# Patient Record
Sex: MALE | Race: WHITE | NOT HISPANIC OR LATINO
[De-identification: names, ages, dates, MRNs, and addresses within clinical notes are randomized per-mention and may not be internally consistent; named-entity substitution may affect disease eponyms.]

---

## 2017-04-11 ENCOUNTER — APPOINTMENT (OUTPATIENT)
Dept: PULMONOLOGY | Facility: CLINIC | Age: 72
End: 2017-04-11

## 2017-04-28 ENCOUNTER — APPOINTMENT (OUTPATIENT)
Dept: PULMONOLOGY | Facility: CLINIC | Age: 72
End: 2017-04-28

## 2017-04-28 VITALS
WEIGHT: 172 LBS | DIASTOLIC BLOOD PRESSURE: 84 MMHG | HEART RATE: 66 BPM | OXYGEN SATURATION: 94 % | SYSTOLIC BLOOD PRESSURE: 160 MMHG | BODY MASS INDEX: 26.54 KG/M2

## 2017-04-28 DIAGNOSIS — J44.9 CHRONIC OBSTRUCTIVE PULMONARY DISEASE, UNSPECIFIED: ICD-10-CM

## 2017-04-28 DIAGNOSIS — J45.909 CHRONIC OBSTRUCTIVE PULMONARY DISEASE, UNSPECIFIED: ICD-10-CM

## 2017-05-05 ENCOUNTER — APPOINTMENT (OUTPATIENT)
Dept: PULMONOLOGY | Facility: CLINIC | Age: 72
End: 2017-05-05

## 2017-05-05 VITALS
DIASTOLIC BLOOD PRESSURE: 78 MMHG | SYSTOLIC BLOOD PRESSURE: 116 MMHG | HEART RATE: 70 BPM | OXYGEN SATURATION: 97 % | BODY MASS INDEX: 26.68 KG/M2 | WEIGHT: 172 LBS | HEIGHT: 67.5 IN

## 2017-06-12 NOTE — ASU PATIENT PROFILE, ADULT - PMH
Bladder cancer    Diabetes mellitus    GI bleeding    HLD (hyperlipidemia)    Hypertension    Lung cancer    SOB (shortness of breath)

## 2017-06-13 ENCOUNTER — OUTPATIENT (OUTPATIENT)
Dept: OUTPATIENT SERVICES | Facility: HOSPITAL | Age: 72
LOS: 1 days | Discharge: HOME | End: 2017-06-13
Payer: MEDICARE

## 2017-06-13 ENCOUNTER — RESULT REVIEW (OUTPATIENT)
Age: 72
End: 2017-06-13

## 2017-06-13 VITALS
OXYGEN SATURATION: 97 % | WEIGHT: 158.73 LBS | HEART RATE: 63 BPM | SYSTOLIC BLOOD PRESSURE: 119 MMHG | RESPIRATION RATE: 16 BRPM | TEMPERATURE: 99 F | DIASTOLIC BLOOD PRESSURE: 58 MMHG | HEIGHT: 67 IN

## 2017-06-13 VITALS
OXYGEN SATURATION: 100 % | HEART RATE: 62 BPM | RESPIRATION RATE: 15 BRPM | DIASTOLIC BLOOD PRESSURE: 70 MMHG | SYSTOLIC BLOOD PRESSURE: 140 MMHG

## 2017-06-13 DIAGNOSIS — Z41.9 ENCOUNTER FOR PROCEDURE FOR PURPOSES OTHER THAN REMEDYING HEALTH STATE, UNSPECIFIED: Chronic | ICD-10-CM

## 2017-06-13 DIAGNOSIS — R91.1 SOLITARY PULMONARY NODULE: Chronic | ICD-10-CM

## 2017-06-13 PROCEDURE — 88305 TISSUE EXAM BY PATHOLOGIST: CPT

## 2017-06-13 PROCEDURE — 52234 CYSTOSCOPY AND TREATMENT: CPT

## 2017-06-13 RX ORDER — ONDANSETRON 8 MG/1
4 TABLET, FILM COATED ORAL EVERY 6 HOURS
Refills: 0 | Status: DISCONTINUED | OUTPATIENT
Start: 2017-06-13 | End: 2017-06-13

## 2017-06-13 RX ORDER — SODIUM CHLORIDE 9 MG/ML
1000 INJECTION, SOLUTION INTRAVENOUS
Refills: 0 | Status: DISCONTINUED | OUTPATIENT
Start: 2017-06-13 | End: 2017-06-13

## 2017-06-13 RX ORDER — HYDROMORPHONE HYDROCHLORIDE 2 MG/ML
0.5 INJECTION INTRAMUSCULAR; INTRAVENOUS; SUBCUTANEOUS ONCE
Refills: 0 | Status: DISCONTINUED | OUTPATIENT
Start: 2017-06-13 | End: 2017-06-13

## 2017-06-13 NOTE — BRIEF OPERATIVE NOTE - OPERATION/FINDINGS
Small sub cm broad based papillary tumor right lateral wall, additional erythematous plaques, posterior wall sent for biopsy

## 2017-06-13 NOTE — PROGRESS NOTE ADULT - SUBJECTIVE AND OBJECTIVE BOX
:  Patient admitted for a TURB/Bladder biopsy as management of a recurrent bladder tumor.  Procedure with risks (including bleeding, infection, change in voiding symptoms, bladder/bowel/vascular injury), benefits and alternatives were thoroughly discussed.  MK Galloway

## 2017-06-13 NOTE — PACU DISCHARGE NOTE - COMMENTS
patient able to void 200 ml of clear yellow urine.  post void scan 65 ml  cleared to be discharged by urology team

## 2017-06-15 LAB — SURGICAL PATHOLOGY STUDY: SIGNIFICANT CHANGE UP

## 2017-06-19 DIAGNOSIS — N32.89 OTHER SPECIFIED DISORDERS OF BLADDER: ICD-10-CM

## 2017-06-19 DIAGNOSIS — C67.2 MALIGNANT NEOPLASM OF LATERAL WALL OF BLADDER: ICD-10-CM

## 2017-06-19 DIAGNOSIS — I10 ESSENTIAL (PRIMARY) HYPERTENSION: ICD-10-CM

## 2017-06-19 DIAGNOSIS — J44.9 CHRONIC OBSTRUCTIVE PULMONARY DISEASE, UNSPECIFIED: ICD-10-CM

## 2017-06-19 DIAGNOSIS — Z85.118 PERSONAL HISTORY OF OTHER MALIGNANT NEOPLASM OF BRONCHUS AND LUNG: ICD-10-CM

## 2017-06-19 DIAGNOSIS — E11.9 TYPE 2 DIABETES MELLITUS WITHOUT COMPLICATIONS: ICD-10-CM

## 2017-06-19 DIAGNOSIS — Z79.84 LONG TERM (CURRENT) USE OF ORAL HYPOGLYCEMIC DRUGS: ICD-10-CM

## 2018-05-14 NOTE — PATIENT PROFILE ADULT. - ABILITY TO HEAR (WITH HEARING AID OR HEARING APPLIANCE IF NORMALLY USED):
[place] in right ear/Mildly to Moderately Impaired: difficulty hearing in some environments or speaker may need to increase volume or speak distinctly

## 2018-05-15 ENCOUNTER — RESULT REVIEW (OUTPATIENT)
Age: 73
End: 2018-05-15

## 2018-05-15 ENCOUNTER — OUTPATIENT (OUTPATIENT)
Dept: INPATIENT UNIT | Facility: HOSPITAL | Age: 73
LOS: 1 days | Discharge: HOME | End: 2018-05-15
Payer: MEDICARE

## 2018-05-15 VITALS
HEART RATE: 62 BPM | SYSTOLIC BLOOD PRESSURE: 125 MMHG | OXYGEN SATURATION: 97 % | DIASTOLIC BLOOD PRESSURE: 58 MMHG | TEMPERATURE: 97 F | RESPIRATION RATE: 16 BRPM | HEIGHT: 67 IN | WEIGHT: 177.91 LBS

## 2018-05-15 VITALS
HEART RATE: 52 BPM | OXYGEN SATURATION: 97 % | SYSTOLIC BLOOD PRESSURE: 123 MMHG | DIASTOLIC BLOOD PRESSURE: 59 MMHG | RESPIRATION RATE: 16 BRPM

## 2018-05-15 DIAGNOSIS — R91.1 SOLITARY PULMONARY NODULE: Chronic | ICD-10-CM

## 2018-05-15 DIAGNOSIS — Z41.9 ENCOUNTER FOR PROCEDURE FOR PURPOSES OTHER THAN REMEDYING HEALTH STATE, UNSPECIFIED: Chronic | ICD-10-CM

## 2018-05-15 LAB
GLUCOSE BLDC GLUCOMTR-MCNC: 205 MG/DL — HIGH (ref 70–99)
GLUCOSE BLDC GLUCOMTR-MCNC: 211 MG/DL — HIGH (ref 70–99)

## 2018-05-15 RX ORDER — HYDROMORPHONE HYDROCHLORIDE 2 MG/ML
0.5 INJECTION INTRAMUSCULAR; INTRAVENOUS; SUBCUTANEOUS
Refills: 0 | Status: DISCONTINUED | OUTPATIENT
Start: 2018-05-15 | End: 2018-05-15

## 2018-05-15 RX ORDER — SODIUM CHLORIDE 9 MG/ML
1000 INJECTION, SOLUTION INTRAVENOUS
Refills: 0 | Status: DISCONTINUED | OUTPATIENT
Start: 2018-05-15 | End: 2018-05-15

## 2018-05-15 RX ORDER — ONDANSETRON 8 MG/1
4 TABLET, FILM COATED ORAL EVERY 6 HOURS
Refills: 0 | Status: DISCONTINUED | OUTPATIENT
Start: 2018-05-15 | End: 2018-05-15

## 2018-05-15 RX ORDER — METOCLOPRAMIDE HCL 10 MG
10 TABLET ORAL ONCE
Refills: 0 | Status: DISCONTINUED | OUTPATIENT
Start: 2018-05-15 | End: 2018-05-15

## 2018-05-15 RX ORDER — OXYCODONE AND ACETAMINOPHEN 5; 325 MG/1; MG/1
1 TABLET ORAL EVERY 4 HOURS
Refills: 0 | Status: DISCONTINUED | OUTPATIENT
Start: 2018-05-15 | End: 2018-05-15

## 2018-05-15 RX ORDER — OXYCODONE AND ACETAMINOPHEN 5; 325 MG/1; MG/1
2 TABLET ORAL EVERY 6 HOURS
Refills: 0 | Status: DISCONTINUED | OUTPATIENT
Start: 2018-05-15 | End: 2018-05-15

## 2018-05-15 RX ORDER — OXYBUTYNIN CHLORIDE 5 MG
5 TABLET ORAL ONCE
Refills: 0 | Status: DISCONTINUED | OUTPATIENT
Start: 2018-05-15 | End: 2018-05-15

## 2018-05-15 RX ORDER — INSULIN LISPRO 100/ML
VIAL (ML) SUBCUTANEOUS
Refills: 0 | Status: DISCONTINUED | OUTPATIENT
Start: 2018-05-15 | End: 2018-05-15

## 2018-05-15 RX ADMIN — Medication 2: at 13:04

## 2018-05-15 NOTE — PROGRESS NOTE ADULT - SUBJECTIVE AND OBJECTIVE BOX
Admit/Preop Note:  Mr. Otero is admitted for a cystoscopy, selective cytologies, retrograde pyelograms, TURBT and possible bladder biopsies with fulguration, as management of a 5mm tumor seen on a CT scan as well as abnormal urine cytology and FISH tests.  The procedure, including the risks (bleeding, infection, change in LUTS, urinary tract/bowel/vascular injuries, need for subsequent intervention), benefits and alternatives were thoroughly discussed.  MK Pereira

## 2018-05-16 LAB
NON-GYNECOLOGICAL CYTOLOGY STUDY: SIGNIFICANT CHANGE UP
SURGICAL PATHOLOGY STUDY: SIGNIFICANT CHANGE UP

## 2018-05-17 PROCEDURE — 86900 BLOOD TYPING SEROLOGIC ABO: CPT

## 2018-05-17 PROCEDURE — 86850 RBC ANTIBODY SCREEN: CPT

## 2018-05-17 PROCEDURE — 86901 BLOOD TYPING SEROLOGIC RH(D): CPT

## 2018-05-17 PROCEDURE — 82962 GLUCOSE BLOOD TEST: CPT

## 2018-05-17 PROCEDURE — 88112 CYTOPATH CELL ENHANCE TECH: CPT

## 2018-05-17 PROCEDURE — 52235 CYSTOSCOPY AND TREATMENT: CPT

## 2018-05-17 PROCEDURE — 88305 TISSUE EXAM BY PATHOLOGIST: CPT

## 2019-11-11 ENCOUNTER — RECORD ABSTRACTING (OUTPATIENT)
Age: 74
End: 2019-11-11

## 2019-11-11 DIAGNOSIS — Z87.891 PERSONAL HISTORY OF NICOTINE DEPENDENCE: ICD-10-CM

## 2019-11-11 DIAGNOSIS — Z78.9 OTHER SPECIFIED HEALTH STATUS: ICD-10-CM

## 2019-11-11 LAB
PSA FREE FLD-MCNC: 31
PSA FREE SERPL-MCNC: 1.1
PSA SERPL-MCNC: 3.5
TESTOST SERPL-MCNC: 405.5

## 2019-11-11 NOTE — ASU PATIENT PROFILE, ADULT - PMH
Bladder cancer    Diabetes mellitus    GI bleeding    HLD (hyperlipidemia)    Hypertension    Lung cancer    SOB (shortness of breath) Bladder cancer    Diabetes mellitus    Diabetes mellitus, type 2    GI bleeding    HLD (hyperlipidemia)    Hypertension    Lung cancer    SOB (shortness of breath)

## 2019-11-11 NOTE — ASU PATIENT PROFILE, ADULT - NS PRO AD PATIENT TYPE
Health Care Proxy (HCP)/Jennifer (Wife) [phone] Jennifer (Wife) [phone]/Health Care Proxy (HCP)

## 2019-11-11 NOTE — ASU PATIENT PROFILE, ADULT - ABILITY TO HEAR (WITH HEARING AID OR HEARING APPLIANCE IF NORMALLY USED):
[place] in right ear/Mildly to Moderately Impaired: difficulty hearing in some environments or speaker may need to increase volume or speak distinctly

## 2019-11-11 NOTE — ASU PATIENT PROFILE, ADULT - PSH
Elective surgery  left ear cyst removal  Elective surgery  Nasal cyst removal  Lung nodule  removal Elective surgery  Nasal cyst removal  Elective surgery  left ear cyst removal  History of surgery  left lung lobectomy

## 2019-11-12 ENCOUNTER — APPOINTMENT (OUTPATIENT)
Dept: UROLOGY | Facility: HOSPITAL | Age: 74
End: 2019-11-12

## 2019-11-18 VITALS
RESPIRATION RATE: 16 BRPM | TEMPERATURE: 98 F | WEIGHT: 168.87 LBS | SYSTOLIC BLOOD PRESSURE: 169 MMHG | HEIGHT: 67 IN | DIASTOLIC BLOOD PRESSURE: 71 MMHG | OXYGEN SATURATION: 98 % | HEART RATE: 70 BPM

## 2019-11-19 ENCOUNTER — APPOINTMENT (OUTPATIENT)
Dept: UROLOGY | Facility: HOSPITAL | Age: 74
End: 2019-11-19

## 2019-11-19 ENCOUNTER — OUTPATIENT (OUTPATIENT)
Dept: OUTPATIENT SERVICES | Facility: HOSPITAL | Age: 74
LOS: 1 days | Discharge: HOME | End: 2019-11-19
Payer: MEDICARE

## 2019-11-19 ENCOUNTER — RESULT REVIEW (OUTPATIENT)
Age: 74
End: 2019-11-19

## 2019-11-19 VITALS
DIASTOLIC BLOOD PRESSURE: 80 MMHG | SYSTOLIC BLOOD PRESSURE: 167 MMHG | TEMPERATURE: 100 F | OXYGEN SATURATION: 99 % | HEART RATE: 62 BPM | RESPIRATION RATE: 20 BRPM

## 2019-11-19 DIAGNOSIS — Z98.890 OTHER SPECIFIED POSTPROCEDURAL STATES: Chronic | ICD-10-CM

## 2019-11-19 DIAGNOSIS — R91.1 SOLITARY PULMONARY NODULE: Chronic | ICD-10-CM

## 2019-11-19 DIAGNOSIS — Z41.9 ENCOUNTER FOR PROCEDURE FOR PURPOSES OTHER THAN REMEDYING HEALTH STATE, UNSPECIFIED: Chronic | ICD-10-CM

## 2019-11-19 LAB
GLUCOSE BLDC GLUCOMTR-MCNC: 187 MG/DL — HIGH (ref 70–99)
GLUCOSE BLDC GLUCOMTR-MCNC: 211 MG/DL — HIGH (ref 70–99)

## 2019-11-19 PROCEDURE — 52235 CYSTOSCOPY AND TREATMENT: CPT

## 2019-11-19 RX ORDER — SODIUM CHLORIDE 9 MG/ML
1000 INJECTION, SOLUTION INTRAVENOUS
Refills: 0 | Status: DISCONTINUED | OUTPATIENT
Start: 2019-11-19 | End: 2019-11-19

## 2019-11-19 RX ORDER — DEXTROSE 50 % IN WATER 50 %
25 SYRINGE (ML) INTRAVENOUS ONCE
Refills: 0 | Status: DISCONTINUED | OUTPATIENT
Start: 2019-11-19 | End: 2019-11-19

## 2019-11-19 RX ORDER — INSULIN LISPRO 100/ML
VIAL (ML) SUBCUTANEOUS AT BEDTIME
Refills: 0 | Status: DISCONTINUED | OUTPATIENT
Start: 2019-11-19 | End: 2019-11-19

## 2019-11-19 RX ORDER — GLUCAGON INJECTION, SOLUTION 0.5 MG/.1ML
1 INJECTION, SOLUTION SUBCUTANEOUS ONCE
Refills: 0 | Status: DISCONTINUED | OUTPATIENT
Start: 2019-11-19 | End: 2019-11-19

## 2019-11-19 RX ORDER — DEXTROSE 50 % IN WATER 50 %
15 SYRINGE (ML) INTRAVENOUS ONCE
Refills: 0 | Status: DISCONTINUED | OUTPATIENT
Start: 2019-11-19 | End: 2019-11-19

## 2019-11-19 RX ORDER — INSULIN LISPRO 100/ML
VIAL (ML) SUBCUTANEOUS
Refills: 0 | Status: DISCONTINUED | OUTPATIENT
Start: 2019-11-19 | End: 2019-11-19

## 2019-11-19 RX ORDER — DEXTROSE 50 % IN WATER 50 %
12.5 SYRINGE (ML) INTRAVENOUS ONCE
Refills: 0 | Status: DISCONTINUED | OUTPATIENT
Start: 2019-11-19 | End: 2019-11-19

## 2019-11-19 RX ADMIN — Medication 2: at 11:34

## 2019-11-19 NOTE — PACU DISCHARGE NOTE - COMMENTS
Pt discharged to home. Thompson Catheter teaching given to pt. Discharge paperwork and  instructions given to pt. Iv heplock removed. Safety protocol maintained.

## 2019-11-19 NOTE — PROGRESS NOTE ADULT - SUBJECTIVE AND OBJECTIVE BOX
:  Admit/Preop Note  Mr. Otero is here for management of recurrent bladder tumors with a bladder biopsy/fulguration and/or TURBt. He also has a urethral stricture which will likely need to be addressed. The procedures with risks (including bleeding, infection, change in voiding symptoms, incontinence, urinary tract/bowel/penile/vascular injuries), benefits and alternatives were thoroughly discussed.

## 2019-11-20 PROBLEM — C67.9 MALIGNANT NEOPLASM OF BLADDER, UNSPECIFIED: Chronic | Status: ACTIVE | Noted: 2017-06-12

## 2019-11-20 PROBLEM — Z00.00 ENCOUNTER FOR PREVENTIVE HEALTH EXAMINATION: Noted: 2019-11-20

## 2019-11-20 PROBLEM — I10 ESSENTIAL (PRIMARY) HYPERTENSION: Chronic | Status: ACTIVE | Noted: 2017-06-12

## 2019-11-20 PROBLEM — E78.5 HYPERLIPIDEMIA, UNSPECIFIED: Chronic | Status: ACTIVE | Noted: 2017-06-12

## 2019-11-20 PROBLEM — C34.90 MALIGNANT NEOPLASM OF UNSPECIFIED PART OF UNSPECIFIED BRONCHUS OR LUNG: Chronic | Status: ACTIVE | Noted: 2017-06-12

## 2019-11-20 LAB — SURGICAL PATHOLOGY STUDY: SIGNIFICANT CHANGE UP

## 2019-11-21 ENCOUNTER — APPOINTMENT (OUTPATIENT)
Dept: UROLOGY | Facility: CLINIC | Age: 74
End: 2019-11-21
Payer: MEDICARE

## 2019-11-21 VITALS
WEIGHT: 178 LBS | HEIGHT: 67.5 IN | SYSTOLIC BLOOD PRESSURE: 139 MMHG | BODY MASS INDEX: 27.61 KG/M2 | TEMPERATURE: 98.4 F | DIASTOLIC BLOOD PRESSURE: 77 MMHG

## 2019-11-21 DIAGNOSIS — E11.9 TYPE 2 DIABETES MELLITUS W/OUT COMPLICATIONS: ICD-10-CM

## 2019-11-21 DIAGNOSIS — E78.5 HYPERLIPIDEMIA, UNSPECIFIED: ICD-10-CM

## 2019-11-21 PROCEDURE — 99215 OFFICE O/P EST HI 40 MIN: CPT

## 2019-11-21 RX ORDER — ALBUTEROL SULFATE 5 MG/ML
SOLUTION, NON-ORAL INHALATION
Refills: 0 | Status: ACTIVE | COMMUNITY

## 2019-11-21 RX ORDER — LINAGLIPTIN AND METFORMIN HYDROCHLORIDE 2.5; 1 MG/1; MG/1
TABLET, FILM COATED ORAL
Refills: 0 | Status: ACTIVE | COMMUNITY

## 2019-11-21 NOTE — REVIEW OF SYSTEMS
[Shortness Of Breath] : shortness of breath [Urinary Retention] : urinary retention [Urinary Stream Starts/Stops] : urinary stream starts and stops [Initiating Urination Req. Strain] : initiating urination requires straining [Negative] : Heme/Lymph

## 2019-11-21 NOTE — ASSESSMENT
[FreeTextEntry1] : I discussed the findings and options in detail with Mr. Otero and his nephew. 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 For this reason I recommended that he again undergo a six-week course of intravesical BCG.\par \par I reviewed the procedure with him including the risks benefits and alternatives.  He will schedule this once he returns from [facility] after Christmas. In the meantime he will continue with the alfuzosin for his lower urinary tract symptoms.  Finally, I advised Mr. Otero to obtain a urine culture 10 days prior to starting the intravesical therapy.

## 2019-11-21 NOTE — PHYSICAL EXAM
[General Appearance - Well Developed] : well developed [General Appearance - Well Nourished] : well nourished [Normal Appearance] : normal appearance [Well Groomed] : well groomed [General Appearance - In No Acute Distress] : no acute distress [Abdomen Soft] : soft [Abdomen Tenderness] : non-tender [Costovertebral Angle Tenderness] : no ~M costovertebral angle tenderness [Urethral Meatus] : meatus normal [Penis Abnormality] : normal uncircumcised penis [Scrotum] : the scrotum was normal [Epididymis] : the epididymides were normal [Testes Tenderness] : no tenderness of the testes [Testes Mass (___cm)] : there were no testicular masses [Rectal Exam - Rectum] : digital rectal exam was normal [No Prostate Nodules] : no prostate nodules [Skin Color & Pigmentation] : normal skin color and pigmentation [Heart Rate And Rhythm] : Heart rate and rhythm were normal [Edema] : no peripheral edema [] : no respiratory distress [Respiration, Rhythm And Depth] : normal respiratory rhythm and effort [Exaggerated Use Of Accessory Muscles For Inspiration] : no accessory muscle use [Oriented To Time, Place, And Person] : oriented to person, place, and time [Affect] : the affect was normal [Mood] : the mood was normal [Not Anxious] : not anxious [Normal Station and Gait] : the gait and station were normal for the patient's age [No Focal Deficits] : no focal deficits [No Palpable Adenopathy] : no palpable adenopathy [FreeTextEntry1] : Testicular asymmetry (Rt >Lt)

## 2019-11-21 NOTE — HISTORY OF PRESENT ILLNESS
[FreeTextEntry1] : Mr. Otero is here for followup after having a transurethral resection of several small bladder tumors earlier this week. He is currently with a Thompson catheter which will be removed for a voiding trial today.\par From his past urologic history Mr. Otero was first diagnosed with bladder cancer in July of 2010 with a recurrence in September 2010 and again in June of 2015, June of 2017 and May of 2018. He has always had known non-muscle invasive disease although high grade. 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 Otero has moderate chronic voiding symptoms (obstructive and irritative) for which he is continuing on alfuzosin. His post void residuals are elevated with a most recent value from October of this year being 121 cc. He also has erectile dysfunction but chooses not to have treatment for this. Finally he has a proximal bulbar urethral stricture which calibrates to approximately 16 or 18 [place]. A PSA from 6/4/19 was stable at 3.5ng/ml.

## 2019-11-22 ENCOUNTER — INPATIENT (INPATIENT)
Facility: HOSPITAL | Age: 74
LOS: 2 days | Discharge: HOME | DRG: 872 | End: 2019-11-25
Attending: UROLOGY | Admitting: UROLOGY
Payer: MEDICARE

## 2019-11-22 VITALS
TEMPERATURE: 98 F | DIASTOLIC BLOOD PRESSURE: 46 MMHG | OXYGEN SATURATION: 96 % | RESPIRATION RATE: 16 BRPM | WEIGHT: 173.06 LBS | HEART RATE: 106 BPM | HEIGHT: 67 IN | SYSTOLIC BLOOD PRESSURE: 92 MMHG

## 2019-11-22 DIAGNOSIS — A41.9 SEPSIS, UNSPECIFIED ORGANISM: ICD-10-CM

## 2019-11-22 DIAGNOSIS — Z41.9 ENCOUNTER FOR PROCEDURE FOR PURPOSES OTHER THAN REMEDYING HEALTH STATE, UNSPECIFIED: Chronic | ICD-10-CM

## 2019-11-22 DIAGNOSIS — Z98.890 OTHER SPECIFIED POSTPROCEDURAL STATES: Chronic | ICD-10-CM

## 2019-11-22 LAB
ALBUMIN SERPL ELPH-MCNC: 4 G/DL — SIGNIFICANT CHANGE UP (ref 3.3–5)
ALP SERPL-CCNC: 48 U/L — SIGNIFICANT CHANGE UP (ref 40–120)
ALT FLD-CCNC: 17 U/L — SIGNIFICANT CHANGE UP (ref 10–45)
ANION GAP SERPL CALC-SCNC: 16 MMOL/L — SIGNIFICANT CHANGE UP (ref 5–17)
APPEARANCE UR: ABNORMAL
APTT BLD: 28.5 SEC — SIGNIFICANT CHANGE UP (ref 27.5–36.3)
AST SERPL-CCNC: 33 U/L — SIGNIFICANT CHANGE UP (ref 10–40)
BASOPHILS # BLD AUTO: 0.04 K/UL — SIGNIFICANT CHANGE UP (ref 0–0.2)
BASOPHILS NFR BLD AUTO: 0.3 % — SIGNIFICANT CHANGE UP (ref 0–2)
BILIRUB SERPL-MCNC: 0.4 MG/DL — SIGNIFICANT CHANGE UP (ref 0.2–1.2)
BILIRUB UR-MCNC: NEGATIVE — SIGNIFICANT CHANGE UP
BUN SERPL-MCNC: 27 MG/DL — HIGH (ref 7–23)
CALCIUM SERPL-MCNC: 9.5 MG/DL — SIGNIFICANT CHANGE UP (ref 8.4–10.5)
CHLORIDE SERPL-SCNC: 98 MMOL/L — SIGNIFICANT CHANGE UP (ref 96–108)
CO2 SERPL-SCNC: 24 MMOL/L — SIGNIFICANT CHANGE UP (ref 22–31)
COLOR SPEC: YELLOW — SIGNIFICANT CHANGE UP
CREAT SERPL-MCNC: 1.75 MG/DL — HIGH (ref 0.5–1.3)
DIFF PNL FLD: ABNORMAL
EOSINOPHIL # BLD AUTO: 0.04 K/UL — SIGNIFICANT CHANGE UP (ref 0–0.5)
EOSINOPHIL NFR BLD AUTO: 0.3 % — SIGNIFICANT CHANGE UP (ref 0–6)
GLUCOSE BLDC GLUCOMTR-MCNC: 112 MG/DL — HIGH (ref 70–99)
GLUCOSE BLDC GLUCOMTR-MCNC: 132 MG/DL — HIGH (ref 70–99)
GLUCOSE SERPL-MCNC: 286 MG/DL — HIGH (ref 70–99)
GLUCOSE UR QL: NEGATIVE — SIGNIFICANT CHANGE UP
HCT VFR BLD CALC: 37.2 % — LOW (ref 39–50)
HGB BLD-MCNC: 12.8 G/DL — LOW (ref 13–17)
IMM GRANULOCYTES NFR BLD AUTO: 0.7 % — SIGNIFICANT CHANGE UP (ref 0–1.5)
INR BLD: 1.21 — HIGH (ref 0.88–1.16)
KETONES UR-MCNC: NEGATIVE — SIGNIFICANT CHANGE UP
LACTATE SERPL-SCNC: 3.7 MMOL/L — HIGH (ref 0.5–2)
LACTATE SERPL-SCNC: 4.4 MMOL/L — CRITICAL HIGH (ref 0.5–2)
LEUKOCYTE ESTERASE UR-ACNC: ABNORMAL
LYMPHOCYTES # BLD AUTO: 0.91 K/UL — LOW (ref 1–3.3)
LYMPHOCYTES # BLD AUTO: 6.9 % — LOW (ref 13–44)
MCHC RBC-ENTMCNC: 32 PG — SIGNIFICANT CHANGE UP (ref 27–34)
MCHC RBC-ENTMCNC: 34.4 GM/DL — SIGNIFICANT CHANGE UP (ref 32–36)
MCV RBC AUTO: 93 FL — SIGNIFICANT CHANGE UP (ref 80–100)
MONOCYTES # BLD AUTO: 0.44 K/UL — SIGNIFICANT CHANGE UP (ref 0–0.9)
MONOCYTES NFR BLD AUTO: 3.3 % — SIGNIFICANT CHANGE UP (ref 2–14)
NEUTROPHILS # BLD AUTO: 11.66 K/UL — HIGH (ref 1.8–7.4)
NEUTROPHILS NFR BLD AUTO: 88.5 % — HIGH (ref 43–77)
NITRITE UR-MCNC: NEGATIVE — SIGNIFICANT CHANGE UP
NRBC # BLD: 0 /100 WBCS — SIGNIFICANT CHANGE UP (ref 0–0)
PH UR: 6.5 — SIGNIFICANT CHANGE UP (ref 5–8)
PLATELET # BLD AUTO: 183 K/UL — SIGNIFICANT CHANGE UP (ref 150–400)
POTASSIUM SERPL-MCNC: 4.4 MMOL/L — SIGNIFICANT CHANGE UP (ref 3.5–5.3)
POTASSIUM SERPL-SCNC: 4.4 MMOL/L — SIGNIFICANT CHANGE UP (ref 3.5–5.3)
PROT SERPL-MCNC: 6.8 G/DL — SIGNIFICANT CHANGE UP (ref 6–8.3)
PROT UR-MCNC: 100 MG/DL
PROTHROM AB SERPL-ACNC: 13.8 SEC — HIGH (ref 10–12.9)
RBC # BLD: 4 M/UL — LOW (ref 4.2–5.8)
RBC # FLD: 13 % — SIGNIFICANT CHANGE UP (ref 10.3–14.5)
SODIUM SERPL-SCNC: 138 MMOL/L — SIGNIFICANT CHANGE UP (ref 135–145)
SP GR SPEC: 1.01 — SIGNIFICANT CHANGE UP (ref 1–1.03)
UROBILINOGEN FLD QL: 0.2 E.U./DL — SIGNIFICANT CHANGE UP
WBC # BLD: 13.18 K/UL — HIGH (ref 3.8–10.5)
WBC # FLD AUTO: 13.18 K/UL — HIGH (ref 3.8–10.5)

## 2019-11-22 PROCEDURE — 93010 ELECTROCARDIOGRAM REPORT: CPT

## 2019-11-22 PROCEDURE — 99291 CRITICAL CARE FIRST HOUR: CPT

## 2019-11-22 PROCEDURE — 71045 X-RAY EXAM CHEST 1 VIEW: CPT | Mod: 26

## 2019-11-22 RX ORDER — VANCOMYCIN HCL 1 G
1000 VIAL (EA) INTRAVENOUS ONCE
Refills: 0 | Status: COMPLETED | OUTPATIENT
Start: 2019-11-22 | End: 2019-11-22

## 2019-11-22 RX ORDER — INFLUENZA VIRUS VACCINE 15; 15; 15; 15 UG/.5ML; UG/.5ML; UG/.5ML; UG/.5ML
0.5 SUSPENSION INTRAMUSCULAR ONCE
Refills: 0 | Status: DISCONTINUED | OUTPATIENT
Start: 2019-11-22 | End: 2019-11-25

## 2019-11-22 RX ORDER — INSULIN LISPRO 100/ML
VIAL (ML) SUBCUTANEOUS AT BEDTIME
Refills: 0 | Status: DISCONTINUED | OUTPATIENT
Start: 2019-11-22 | End: 2019-11-25

## 2019-11-22 RX ORDER — DEXTROSE 50 % IN WATER 50 %
25 SYRINGE (ML) INTRAVENOUS ONCE
Refills: 0 | Status: DISCONTINUED | OUTPATIENT
Start: 2019-11-22 | End: 2019-11-25

## 2019-11-22 RX ORDER — INSULIN LISPRO 100/ML
VIAL (ML) SUBCUTANEOUS
Refills: 0 | Status: DISCONTINUED | OUTPATIENT
Start: 2019-11-22 | End: 2019-11-25

## 2019-11-22 RX ORDER — ACETAMINOPHEN 500 MG
650 TABLET ORAL EVERY 6 HOURS
Refills: 0 | Status: DISCONTINUED | OUTPATIENT
Start: 2019-11-22 | End: 2019-11-25

## 2019-11-22 RX ORDER — PIPERACILLIN AND TAZOBACTAM 4; .5 G/20ML; G/20ML
3.38 INJECTION, POWDER, LYOPHILIZED, FOR SOLUTION INTRAVENOUS ONCE
Refills: 0 | Status: COMPLETED | OUTPATIENT
Start: 2019-11-22 | End: 2019-11-22

## 2019-11-22 RX ORDER — SODIUM CHLORIDE 9 MG/ML
2400 INJECTION INTRAMUSCULAR; INTRAVENOUS; SUBCUTANEOUS ONCE
Refills: 0 | Status: COMPLETED | OUTPATIENT
Start: 2019-11-22 | End: 2019-11-22

## 2019-11-22 RX ORDER — PIPERACILLIN AND TAZOBACTAM 4; .5 G/20ML; G/20ML
3.38 INJECTION, POWDER, LYOPHILIZED, FOR SOLUTION INTRAVENOUS EVERY 6 HOURS
Refills: 0 | Status: DISCONTINUED | OUTPATIENT
Start: 2019-11-22 | End: 2019-11-25

## 2019-11-22 RX ORDER — ONDANSETRON 8 MG/1
4 TABLET, FILM COATED ORAL EVERY 6 HOURS
Refills: 0 | Status: DISCONTINUED | OUTPATIENT
Start: 2019-11-22 | End: 2019-11-25

## 2019-11-22 RX ORDER — GLUCAGON INJECTION, SOLUTION 0.5 MG/.1ML
1 INJECTION, SOLUTION SUBCUTANEOUS ONCE
Refills: 0 | Status: DISCONTINUED | OUTPATIENT
Start: 2019-11-22 | End: 2019-11-25

## 2019-11-22 RX ORDER — DEXTROSE 50 % IN WATER 50 %
12.5 SYRINGE (ML) INTRAVENOUS ONCE
Refills: 0 | Status: DISCONTINUED | OUTPATIENT
Start: 2019-11-22 | End: 2019-11-25

## 2019-11-22 RX ORDER — SODIUM CHLORIDE 9 MG/ML
1000 INJECTION, SOLUTION INTRAVENOUS
Refills: 0 | Status: DISCONTINUED | OUTPATIENT
Start: 2019-11-22 | End: 2019-11-25

## 2019-11-22 RX ORDER — ATORVASTATIN CALCIUM 80 MG/1
40 TABLET, FILM COATED ORAL AT BEDTIME
Refills: 0 | Status: DISCONTINUED | OUTPATIENT
Start: 2019-11-22 | End: 2019-11-25

## 2019-11-22 RX ORDER — LOSARTAN POTASSIUM 100 MG/1
25 TABLET, FILM COATED ORAL DAILY
Refills: 0 | Status: DISCONTINUED | OUTPATIENT
Start: 2019-11-22 | End: 2019-11-25

## 2019-11-22 RX ORDER — DEXTROSE 50 % IN WATER 50 %
15 SYRINGE (ML) INTRAVENOUS ONCE
Refills: 0 | Status: DISCONTINUED | OUTPATIENT
Start: 2019-11-22 | End: 2019-11-25

## 2019-11-22 RX ADMIN — Medication 650 MILLIGRAM(S): at 20:30

## 2019-11-22 RX ADMIN — Medication 250 MILLIGRAM(S): at 13:30

## 2019-11-22 RX ADMIN — PIPERACILLIN AND TAZOBACTAM 200 GRAM(S): 4; .5 INJECTION, POWDER, LYOPHILIZED, FOR SOLUTION INTRAVENOUS at 23:58

## 2019-11-22 RX ADMIN — SODIUM CHLORIDE 2400 MILLILITER(S): 9 INJECTION INTRAMUSCULAR; INTRAVENOUS; SUBCUTANEOUS at 13:08

## 2019-11-22 RX ADMIN — ATORVASTATIN CALCIUM 40 MILLIGRAM(S): 80 TABLET, FILM COATED ORAL at 23:58

## 2019-11-22 RX ADMIN — PIPERACILLIN AND TAZOBACTAM 200 GRAM(S): 4; .5 INJECTION, POWDER, LYOPHILIZED, FOR SOLUTION INTRAVENOUS at 13:14

## 2019-11-22 RX ADMIN — Medication 650 MILLIGRAM(S): at 19:31

## 2019-11-22 RX ADMIN — PIPERACILLIN AND TAZOBACTAM 200 GRAM(S): 4; .5 INJECTION, POWDER, LYOPHILIZED, FOR SOLUTION INTRAVENOUS at 18:33

## 2019-11-22 NOTE — ED PROVIDER NOTE - PROGRESS NOTE DETAILS
[place]: Vitals improved. Feeling better. Elevated cr, unkown baseline. Lactates till elevated but improving. Pt w/ brief run of afib on tele monitor.  consulted, will admit for further care.

## 2019-11-22 NOTE — H&P ADULT - NSICDXPASTSURGICALHX_GEN_ALL_CORE_FT
PAST SURGICAL HISTORY:  Elective surgery left ear cyst removal    Elective surgery Nasal cyst removal    History of surgery left lung lobectomy

## 2019-11-22 NOTE — ED ADULT NURSE NOTE - NSIMPLEMENTINTERV_GEN_ALL_ED
Implemented All Universal Safety Interventions:  [place] to call system. Call bell, personal items and telephone within reach. Instruct patient to call for assistance. Room bathroom lighting operational. Non-slip footwear when patient is off stretcher. Physically safe environment: no spills, clutter or unnecessary equipment. Stretcher in lowest position, wheels locked, appropriate side rails in place.

## 2019-11-22 NOTE — PATIENT PROFILE ADULT - ABILITY TO HEAR (WITH HEARING AID OR HEARING APPLIANCE IF NORMALLY USED):
[place] in right ear/Mildly to Moderately Impaired: difficulty hearing in some environments or speaker may need to increase volume or speak distinctly

## 2019-11-22 NOTE — ED ADULT NURSE NOTE - PSH
Elective surgery  Nasal cyst removal  Elective surgery  left ear cyst removal  History of surgery  left lung lobectomy

## 2019-11-22 NOTE — ED PROVIDER NOTE - PMH
Bladder cancer    Diabetes mellitus    Diabetes mellitus, type 2    GI bleeding    HLD (hyperlipidemia)    Hypertension    Lung cancer    SOB (shortness of breath)

## 2019-11-22 NOTE — H&P ADULT - HISTORY OF PRESENT ILLNESS
73 yo m with hx HTN, HLD , DM and bladder tumor s/p TURBT 11/19 presents to the ER now with fever and chills. Per patient his potter was removed yesterday, at first he had had a little trouble voiding but it improved over the course of the day. Then late lastnight patient began experiencing fever and chills. Patient also c/o dysuria and urgency denies frequency or hematuria.

## 2019-11-22 NOTE — ED PROVIDER NOTE - OBJECTIVE STATEMENT
Belgian, prefers nephew to translate  74M PMH bladder ca s/p recent "bladder scraping" a few days ago and just had potter removed yesterday, p/w f/c and rigors since last night. Also occasional NV. Had diarrhea, now resolved. Denies HA, SOB/CP, abd pain, dysuria, black/bloody stool, LE pain/swelling.

## 2019-11-22 NOTE — H&P ADULT - NSICDXPASTMEDICALHX_GEN_ALL_CORE_FT
PAST MEDICAL HISTORY:  Bladder cancer     Diabetes mellitus     Diabetes mellitus, type 2     GI bleeding     HLD (hyperlipidemia)     Hypertension     Lung cancer     SOB (shortness of breath)

## 2019-11-22 NOTE — ED ADULT TRIAGE NOTE - OTHER COMPLAINTS
patient presents s/p bladder scraping x 2 days ago--- as per family member, patient with rigors and chills/diarrhea---potter removed yesterday

## 2019-11-22 NOTE — PROGRESS NOTE ADULT - SUBJECTIVE AND OBJECTIVE BOX
: Admit Note  Mr. Otero was admitted via the ED earlier today because of a reported fever to 103, rigors and malaise.  He underwent bladder biopsies 3 days ago for recurrent bladder cancer and underwent a voiding trial yesterday.  Labs:    WBC: 13.1, Lactate: 4.4, Creat: 1.75    VS:  T(C): 38.4 (11-22-19 @ 19:21), Max: 38.4 (11-22-19 @ 19:21)  HR: 85 (11-22-19 @ 19:21) (76 - 106)  BP: 110/57 (11-22-19 @ 19:21) (92/46 - 132/63)  RR: 18 (11-22-19 @ 19:21) (16 - 18)  SpO2: 95% (11-22-19 @ 19:21) (95% - 98%)    He was given Vancomycin and Zosyn and is currently on ceftriaxone pending blood and urine cultures.

## 2019-11-22 NOTE — ED PROVIDER NOTE - CLINICAL SUMMARY MEDICAL DECISION MAKING FREE TEXT BOX
74M PMH bladder ca s/p recent "bladder scraping" a few days ago and just had potter removed yesterday, p/w f/c and rigors since last night. Also occasional NV. Had diarrhea, now resolved. No other systemic symptoms. Hypotensive, tachycardic, other vitals wnl. Exam as above.  ddx: Likely infectious. Sepsis. Possible bacteremia.   sepsis labs, IVF, empiric abx, reassess.

## 2019-11-22 NOTE — ED ADULT NURSE NOTE - OBJECTIVE STATEMENT
Patient is a 75yo male s/p bladder scraping r/t bladder cancer 2 days ago reporting chills and diarrhea today. Patient states Thompson catheter was removed yesterday, able to urinate. Denies chest pain, shortness of breath, abdominal pain, n/v.

## 2019-11-23 LAB
ANION GAP SERPL CALC-SCNC: 11 MMOL/L — SIGNIFICANT CHANGE UP (ref 5–17)
BUN SERPL-MCNC: 17 MG/DL — SIGNIFICANT CHANGE UP (ref 7–23)
CALCIUM SERPL-MCNC: 9.2 MG/DL — SIGNIFICANT CHANGE UP (ref 8.4–10.5)
CHLORIDE SERPL-SCNC: 103 MMOL/L — SIGNIFICANT CHANGE UP (ref 96–108)
CO2 SERPL-SCNC: 26 MMOL/L — SIGNIFICANT CHANGE UP (ref 22–31)
CREAT SERPL-MCNC: 1.31 MG/DL — HIGH (ref 0.5–1.3)
GLUCOSE BLDC GLUCOMTR-MCNC: 168 MG/DL — HIGH (ref 70–99)
GLUCOSE BLDC GLUCOMTR-MCNC: 190 MG/DL — HIGH (ref 70–99)
GLUCOSE BLDC GLUCOMTR-MCNC: 195 MG/DL — HIGH (ref 70–99)
GLUCOSE BLDC GLUCOMTR-MCNC: 226 MG/DL — HIGH (ref 70–99)
GLUCOSE SERPL-MCNC: 193 MG/DL — HIGH (ref 70–99)
HBA1C BLD-MCNC: 7.3 % — HIGH (ref 4–5.6)
HCT VFR BLD CALC: 35.3 % — LOW (ref 39–50)
HCV AB S/CO SERPL IA: 0.11 S/CO — SIGNIFICANT CHANGE UP
HCV AB SERPL-IMP: SIGNIFICANT CHANGE UP
HGB BLD-MCNC: 11.4 G/DL — LOW (ref 13–17)
MAGNESIUM SERPL-MCNC: 1.4 MG/DL — LOW (ref 1.6–2.6)
MCHC RBC-ENTMCNC: 31 PG — SIGNIFICANT CHANGE UP (ref 27–34)
MCHC RBC-ENTMCNC: 32.3 GM/DL — SIGNIFICANT CHANGE UP (ref 32–36)
MCV RBC AUTO: 95.9 FL — SIGNIFICANT CHANGE UP (ref 80–100)
NRBC # BLD: 0 /100 WBCS — SIGNIFICANT CHANGE UP (ref 0–0)
PHOSPHATE SERPL-MCNC: 3 MG/DL — SIGNIFICANT CHANGE UP (ref 2.5–4.5)
PLATELET # BLD AUTO: 163 K/UL — SIGNIFICANT CHANGE UP (ref 150–400)
POTASSIUM SERPL-MCNC: 4.3 MMOL/L — SIGNIFICANT CHANGE UP (ref 3.5–5.3)
POTASSIUM SERPL-SCNC: 4.3 MMOL/L — SIGNIFICANT CHANGE UP (ref 3.5–5.3)
RBC # BLD: 3.68 M/UL — LOW (ref 4.2–5.8)
RBC # FLD: 12.9 % — SIGNIFICANT CHANGE UP (ref 10.3–14.5)
SODIUM SERPL-SCNC: 140 MMOL/L — SIGNIFICANT CHANGE UP (ref 135–145)
WBC # BLD: 7.41 K/UL — SIGNIFICANT CHANGE UP (ref 3.8–10.5)
WBC # FLD AUTO: 7.41 K/UL — SIGNIFICANT CHANGE UP (ref 3.8–10.5)

## 2019-11-23 PROCEDURE — 76770 US EXAM ABDO BACK WALL COMP: CPT | Mod: 26

## 2019-11-23 RX ADMIN — PIPERACILLIN AND TAZOBACTAM 200 GRAM(S): 4; .5 INJECTION, POWDER, LYOPHILIZED, FOR SOLUTION INTRAVENOUS at 12:01

## 2019-11-23 RX ADMIN — Medication 650 MILLIGRAM(S): at 10:28

## 2019-11-23 RX ADMIN — PIPERACILLIN AND TAZOBACTAM 200 GRAM(S): 4; .5 INJECTION, POWDER, LYOPHILIZED, FOR SOLUTION INTRAVENOUS at 05:37

## 2019-11-23 RX ADMIN — LOSARTAN POTASSIUM 25 MILLIGRAM(S): 100 TABLET, FILM COATED ORAL at 05:37

## 2019-11-23 RX ADMIN — Medication 2: at 07:03

## 2019-11-23 RX ADMIN — Medication 2: at 17:56

## 2019-11-23 RX ADMIN — Medication 4: at 12:01

## 2019-11-23 RX ADMIN — ATORVASTATIN CALCIUM 40 MILLIGRAM(S): 80 TABLET, FILM COATED ORAL at 21:51

## 2019-11-23 RX ADMIN — PIPERACILLIN AND TAZOBACTAM 200 GRAM(S): 4; .5 INJECTION, POWDER, LYOPHILIZED, FOR SOLUTION INTRAVENOUS at 17:57

## 2019-11-23 NOTE — PROGRESS NOTE ADULT - SUBJECTIVE AND OBJECTIVE BOX
AM Note    No acute events overnight.      Vital Signs Last 24 Hrs  T(C): 37.2 (11-23-19 @ 03:52), Max: 38.4 (11-22-19 @ 19:21)  T(F): 99 (11-23-19 @ 03:52), Max: 101.1 (11-22-19 @ 19:21)  HR: 64 (11-23-19 @ 04:20) (60 - 106)  BP: 126/61 (11-23-19 @ 04:20) (92/46 - 132/63)  BP(mean): 87 (11-23-19 @ 04:20) (72 - 87)  RR: 16 (11-23-19 @ 04:20) (14 - 18)  SpO2: 95% (11-23-19 @ 04:20) (95% - 98%)     22 Nov 2019 13:21    138    |  98     |  27     ----------------------------<  286    4.4     |  24     |  1.75     Ca    9.5        22 Nov 2019 13:21    TPro  6.8    /  Alb  4.0    /  TBili  0.4    /  DBili  x      /  AST  33     /  ALT  17     /  AlkPhos  48     22 Nov 2019 13:21                          12.8   13.18 )-----------( 183      ( 22 Nov 2019 13:21 )             37.2         I&O's Summary    22 Nov 2019 07:01  -  23 Nov 2019 06:24  --------------------------------------------------------  IN: 0 mL / OUT: 1350 mL / NET: -1350 mL          PHYSICAL EXAM:    GEN: NAD  ABD: soft, NT/ND  : 18F coude in place

## 2019-11-23 NOTE — PROGRESS NOTE ADULT - PROBLEM SELECTOR PLAN 1
-IS/SCDs  -Pain control  -Glycemic control  -Diabetic diet  -C/W Zosyn  -Sliding scale  -F/U cultures  -Thompson to gravity bag

## 2019-11-23 NOTE — PROGRESS NOTE ADULT - SUBJECTIVE AND OBJECTIVE BOX
:  Comfortable this am.  VS:  T(C): 37.1 (11-23-19 @ 09:14), Max: 38.4 (11-22-19 @ 19:21)  HR: 74 (11-23-19 @ 08:17) (60 - 106)  BP: 123/56 (11-23-19 @ 08:17) (92/46 - 132/63)  RR: 18 (11-23-19 @ 08:17) (14 - 18)  SpO2: 96% (11-23-19 @ 08:17) (95% - 98%)    Labs: Creat: 1.3mg/dl; WBC: 7.4  Blood cultures are neg to date.    PLAN: Continue with current management pending culture results.             Voiding trial in am.

## 2019-11-24 LAB
-  AMPICILLIN/SULBACTAM: SIGNIFICANT CHANGE UP
-  AMPICILLIN: SIGNIFICANT CHANGE UP
-  CEFAZOLIN: SIGNIFICANT CHANGE UP
-  CEFTRIAXONE: SIGNIFICANT CHANGE UP
-  CIPROFLOXACIN: SIGNIFICANT CHANGE UP
-  GENTAMICIN: SIGNIFICANT CHANGE UP
-  NITROFURANTOIN: SIGNIFICANT CHANGE UP
-  PIPERACILLIN/TAZOBACTAM: SIGNIFICANT CHANGE UP
-  TOBRAMYCIN: SIGNIFICANT CHANGE UP
-  TRIMETHOPRIM/SULFAMETHOXAZOLE: SIGNIFICANT CHANGE UP
ANION GAP SERPL CALC-SCNC: 9 MMOL/L — SIGNIFICANT CHANGE UP (ref 5–17)
BUN SERPL-MCNC: 12 MG/DL — SIGNIFICANT CHANGE UP (ref 7–23)
CALCIUM SERPL-MCNC: 9 MG/DL — SIGNIFICANT CHANGE UP (ref 8.4–10.5)
CHLORIDE SERPL-SCNC: 102 MMOL/L — SIGNIFICANT CHANGE UP (ref 96–108)
CO2 SERPL-SCNC: 26 MMOL/L — SIGNIFICANT CHANGE UP (ref 22–31)
CREAT SERPL-MCNC: 1.17 MG/DL — SIGNIFICANT CHANGE UP (ref 0.5–1.3)
CULTURE RESULTS: SIGNIFICANT CHANGE UP
GLUCOSE BLDC GLUCOMTR-MCNC: 172 MG/DL — HIGH (ref 70–99)
GLUCOSE BLDC GLUCOMTR-MCNC: 227 MG/DL — HIGH (ref 70–99)
GLUCOSE BLDC GLUCOMTR-MCNC: 229 MG/DL — HIGH (ref 70–99)
GLUCOSE BLDC GLUCOMTR-MCNC: 291 MG/DL — HIGH (ref 70–99)
GLUCOSE SERPL-MCNC: 209 MG/DL — HIGH (ref 70–99)
HCT VFR BLD CALC: 35.6 % — LOW (ref 39–50)
HGB BLD-MCNC: 11.6 G/DL — LOW (ref 13–17)
MAGNESIUM SERPL-MCNC: 1.3 MG/DL — LOW (ref 1.6–2.6)
MCHC RBC-ENTMCNC: 30.9 PG — SIGNIFICANT CHANGE UP (ref 27–34)
MCHC RBC-ENTMCNC: 32.6 GM/DL — SIGNIFICANT CHANGE UP (ref 32–36)
MCV RBC AUTO: 94.7 FL — SIGNIFICANT CHANGE UP (ref 80–100)
METHOD TYPE: SIGNIFICANT CHANGE UP
NRBC # BLD: 0 /100 WBCS — SIGNIFICANT CHANGE UP (ref 0–0)
ORGANISM # SPEC MICROSCOPIC CNT: SIGNIFICANT CHANGE UP
ORGANISM # SPEC MICROSCOPIC CNT: SIGNIFICANT CHANGE UP
PHOSPHATE SERPL-MCNC: 3.3 MG/DL — SIGNIFICANT CHANGE UP (ref 2.5–4.5)
PLATELET # BLD AUTO: 161 K/UL — SIGNIFICANT CHANGE UP (ref 150–400)
POTASSIUM SERPL-MCNC: 4.4 MMOL/L — SIGNIFICANT CHANGE UP (ref 3.5–5.3)
POTASSIUM SERPL-SCNC: 4.4 MMOL/L — SIGNIFICANT CHANGE UP (ref 3.5–5.3)
RBC # BLD: 3.76 M/UL — LOW (ref 4.2–5.8)
RBC # FLD: 12.6 % — SIGNIFICANT CHANGE UP (ref 10.3–14.5)
SODIUM SERPL-SCNC: 137 MMOL/L — SIGNIFICANT CHANGE UP (ref 135–145)
SPECIMEN SOURCE: SIGNIFICANT CHANGE UP
WBC # BLD: 6.14 K/UL — SIGNIFICANT CHANGE UP (ref 3.8–10.5)
WBC # FLD AUTO: 6.14 K/UL — SIGNIFICANT CHANGE UP (ref 3.8–10.5)

## 2019-11-24 RX ORDER — TAMSULOSIN HYDROCHLORIDE 0.4 MG/1
0.4 CAPSULE ORAL AT BEDTIME
Refills: 0 | Status: DISCONTINUED | OUTPATIENT
Start: 2019-11-24 | End: 2019-11-25

## 2019-11-24 RX ADMIN — PIPERACILLIN AND TAZOBACTAM 200 GRAM(S): 4; .5 INJECTION, POWDER, LYOPHILIZED, FOR SOLUTION INTRAVENOUS at 17:39

## 2019-11-24 RX ADMIN — ATORVASTATIN CALCIUM 40 MILLIGRAM(S): 80 TABLET, FILM COATED ORAL at 21:48

## 2019-11-24 RX ADMIN — Medication 650 MILLIGRAM(S): at 21:53

## 2019-11-24 RX ADMIN — PIPERACILLIN AND TAZOBACTAM 200 GRAM(S): 4; .5 INJECTION, POWDER, LYOPHILIZED, FOR SOLUTION INTRAVENOUS at 12:16

## 2019-11-24 RX ADMIN — Medication 650 MILLIGRAM(S): at 22:30

## 2019-11-24 RX ADMIN — LOSARTAN POTASSIUM 25 MILLIGRAM(S): 100 TABLET, FILM COATED ORAL at 05:59

## 2019-11-24 RX ADMIN — PIPERACILLIN AND TAZOBACTAM 200 GRAM(S): 4; .5 INJECTION, POWDER, LYOPHILIZED, FOR SOLUTION INTRAVENOUS at 00:13

## 2019-11-24 RX ADMIN — Medication 4: at 17:19

## 2019-11-24 RX ADMIN — Medication 6: at 12:16

## 2019-11-24 RX ADMIN — PIPERACILLIN AND TAZOBACTAM 200 GRAM(S): 4; .5 INJECTION, POWDER, LYOPHILIZED, FOR SOLUTION INTRAVENOUS at 05:59

## 2019-11-24 RX ADMIN — Medication 4: at 07:03

## 2019-11-24 RX ADMIN — PIPERACILLIN AND TAZOBACTAM 200 GRAM(S): 4; .5 INJECTION, POWDER, LYOPHILIZED, FOR SOLUTION INTRAVENOUS at 23:31

## 2019-11-24 RX ADMIN — TAMSULOSIN HYDROCHLORIDE 0.4 MILLIGRAM(S): 0.4 CAPSULE ORAL at 21:48

## 2019-11-24 NOTE — PROGRESS NOTE ADULT - SUBJECTIVE AND OBJECTIVE BOX
:  Comfortable overnight.  VSS.  Continues afebrile  Labs: Creat: 1.17mg/dl, WBC: 6.1  Urine grew >100,000 E-coli  Blood cultures are negative to date.    Donna ROMEO/Mann this am  Plan:  1. Proceed with a VT and monitor residual urine           2.  Change to oral antibiotics once sensitivities are available           3.  Tentative discharge home tomorrow on a 5 day course of the appropriate oral antibiotic followed by a 6mth course of low dose suppression.

## 2019-11-24 NOTE — PROGRESS NOTE ADULT - SUBJECTIVE AND OBJECTIVE BOX
AM Note    No acute events overnight.      Vital Signs Last 24 Hrs  T(C): 36.8 (11-23-19 @ 21:49), Max: 37.1 (11-23-19 @ 09:14)  T(F): 98.3 (11-23-19 @ 21:49), Max: 98.8 (11-23-19 @ 09:14)  HR: 54 (11-24-19 @ 00:00) (54 - 74)  BP: 124/58 (11-24-19 @ 00:00) (115/58 - 162/77)  BP(mean): 84 (11-24-19 @ 00:00) (78 - 110)  RR: 18 (11-24-19 @ 00:00) (18 - 18)  SpO2: 96% (11-24-19 @ 00:00) (95% - 97%)     23 Nov 2019 06:22    140    |  103    |  17     ----------------------------<  193    4.3     |  26     |  1.31     Ca    9.2        23 Nov 2019 06:22  Phos  3.0       23 Nov 2019 06:22  Mg     1.4       23 Nov 2019 06:22    TPro  6.8    /  Alb  4.0    /  TBili  0.4    /  DBili  x      /  AST  33     /  ALT  17     /  AlkPhos  48     22 Nov 2019 13:21                          11.4   7.41  )-----------( 163      ( 23 Nov 2019 06:22 )             35.3         I&O's Summary    22 Nov 2019 07:01  -  23 Nov 2019 07:00  --------------------------------------------------------  IN: 0 mL / OUT: 2200 mL / NET: -2200 mL    23 Nov 2019 07:01  -  24 Nov 2019 04:42  --------------------------------------------------------  IN: 120 mL / OUT: 2250 mL / NET: -2130 mL          PHYSICAL EXAM:    GEN: NAD  ABD: soft, NT/ND  : potter draining well

## 2019-11-25 ENCOUNTER — TRANSCRIPTION ENCOUNTER (OUTPATIENT)
Age: 74
End: 2019-11-25

## 2019-11-25 VITALS
SYSTOLIC BLOOD PRESSURE: 136 MMHG | RESPIRATION RATE: 17 BRPM | HEART RATE: 99 BPM | TEMPERATURE: 97 F | DIASTOLIC BLOOD PRESSURE: 77 MMHG | OXYGEN SATURATION: 98 %

## 2019-11-25 LAB
ANION GAP SERPL CALC-SCNC: 11 MMOL/L — SIGNIFICANT CHANGE UP (ref 5–17)
BUN SERPL-MCNC: 11 MG/DL — SIGNIFICANT CHANGE UP (ref 7–23)
CALCIUM SERPL-MCNC: 9.1 MG/DL — SIGNIFICANT CHANGE UP (ref 8.4–10.5)
CHLORIDE SERPL-SCNC: 99 MMOL/L — SIGNIFICANT CHANGE UP (ref 96–108)
CO2 SERPL-SCNC: 24 MMOL/L — SIGNIFICANT CHANGE UP (ref 22–31)
CREAT SERPL-MCNC: 1.04 MG/DL — SIGNIFICANT CHANGE UP (ref 0.5–1.3)
GLUCOSE BLDC GLUCOMTR-MCNC: 226 MG/DL — HIGH (ref 70–99)
GLUCOSE BLDC GLUCOMTR-MCNC: 299 MG/DL — HIGH (ref 70–99)
GLUCOSE SERPL-MCNC: 225 MG/DL — HIGH (ref 70–99)
HCT VFR BLD CALC: 35.8 % — LOW (ref 39–50)
HGB BLD-MCNC: 11.7 G/DL — LOW (ref 13–17)
MAGNESIUM SERPL-MCNC: 1.1 MG/DL — LOW (ref 1.6–2.6)
MCHC RBC-ENTMCNC: 30.9 PG — SIGNIFICANT CHANGE UP (ref 27–34)
MCHC RBC-ENTMCNC: 32.7 GM/DL — SIGNIFICANT CHANGE UP (ref 32–36)
MCV RBC AUTO: 94.5 FL — SIGNIFICANT CHANGE UP (ref 80–100)
NRBC # BLD: 0 /100 WBCS — SIGNIFICANT CHANGE UP (ref 0–0)
PHOSPHATE SERPL-MCNC: 3.6 MG/DL — SIGNIFICANT CHANGE UP (ref 2.5–4.5)
PLATELET # BLD AUTO: 182 K/UL — SIGNIFICANT CHANGE UP (ref 150–400)
POTASSIUM SERPL-MCNC: 4 MMOL/L — SIGNIFICANT CHANGE UP (ref 3.5–5.3)
POTASSIUM SERPL-SCNC: 4 MMOL/L — SIGNIFICANT CHANGE UP (ref 3.5–5.3)
RBC # BLD: 3.79 M/UL — LOW (ref 4.2–5.8)
RBC # FLD: 12.5 % — SIGNIFICANT CHANGE UP (ref 10.3–14.5)
SODIUM SERPL-SCNC: 134 MMOL/L — LOW (ref 135–145)
WBC # BLD: 5.44 K/UL — SIGNIFICANT CHANGE UP (ref 3.8–10.5)
WBC # FLD AUTO: 5.44 K/UL — SIGNIFICANT CHANGE UP (ref 3.8–10.5)

## 2019-11-25 PROCEDURE — 83605 ASSAY OF LACTIC ACID: CPT

## 2019-11-25 PROCEDURE — 93005 ELECTROCARDIOGRAM TRACING: CPT

## 2019-11-25 PROCEDURE — 85027 COMPLETE CBC AUTOMATED: CPT

## 2019-11-25 PROCEDURE — 76770 US EXAM ABDO BACK WALL COMP: CPT

## 2019-11-25 PROCEDURE — 86850 RBC ANTIBODY SCREEN: CPT

## 2019-11-25 PROCEDURE — 85610 PROTHROMBIN TIME: CPT

## 2019-11-25 PROCEDURE — 84100 ASSAY OF PHOSPHORUS: CPT

## 2019-11-25 PROCEDURE — 87086 URINE CULTURE/COLONY COUNT: CPT

## 2019-11-25 PROCEDURE — 85025 COMPLETE CBC W/AUTO DIFF WBC: CPT

## 2019-11-25 PROCEDURE — 83036 HEMOGLOBIN GLYCOSYLATED A1C: CPT

## 2019-11-25 PROCEDURE — 85730 THROMBOPLASTIN TIME PARTIAL: CPT

## 2019-11-25 PROCEDURE — 86900 BLOOD TYPING SEROLOGIC ABO: CPT

## 2019-11-25 PROCEDURE — 88305 TISSUE EXAM BY PATHOLOGIST: CPT

## 2019-11-25 PROCEDURE — 36415 COLL VENOUS BLD VENIPUNCTURE: CPT

## 2019-11-25 PROCEDURE — 99285 EMERGENCY DEPT VISIT HI MDM: CPT | Mod: 25

## 2019-11-25 PROCEDURE — 86901 BLOOD TYPING SEROLOGIC RH(D): CPT

## 2019-11-25 PROCEDURE — 71045 X-RAY EXAM CHEST 1 VIEW: CPT

## 2019-11-25 PROCEDURE — 96375 TX/PRO/DX INJ NEW DRUG ADDON: CPT

## 2019-11-25 PROCEDURE — 86803 HEPATITIS C AB TEST: CPT

## 2019-11-25 PROCEDURE — 87186 SC STD MICRODIL/AGAR DIL: CPT

## 2019-11-25 PROCEDURE — 96374 THER/PROPH/DIAG INJ IV PUSH: CPT

## 2019-11-25 PROCEDURE — 81001 URINALYSIS AUTO W/SCOPE: CPT

## 2019-11-25 PROCEDURE — 87040 BLOOD CULTURE FOR BACTERIA: CPT

## 2019-11-25 PROCEDURE — 83735 ASSAY OF MAGNESIUM: CPT

## 2019-11-25 PROCEDURE — 82962 GLUCOSE BLOOD TEST: CPT

## 2019-11-25 PROCEDURE — 80053 COMPREHEN METABOLIC PANEL: CPT

## 2019-11-25 PROCEDURE — 80048 BASIC METABOLIC PNL TOTAL CA: CPT

## 2019-11-25 RX ORDER — TAMSULOSIN HYDROCHLORIDE 0.4 MG/1
1 CAPSULE ORAL
Qty: 30 | Refills: 0
Start: 2019-11-25 | End: 2019-12-24

## 2019-11-25 RX ORDER — MAGNESIUM SULFATE 500 MG/ML
2 VIAL (ML) INJECTION EVERY 6 HOURS
Refills: 0 | Status: DISCONTINUED | OUTPATIENT
Start: 2019-11-25 | End: 2019-11-25

## 2019-11-25 RX ORDER — CEFPODOXIME PROXETIL 100 MG
1 TABLET ORAL
Qty: 20 | Refills: 0
Start: 2019-11-25 | End: 2019-12-04

## 2019-11-25 RX ADMIN — LOSARTAN POTASSIUM 25 MILLIGRAM(S): 100 TABLET, FILM COATED ORAL at 05:49

## 2019-11-25 RX ADMIN — Medication 650 MILLIGRAM(S): at 05:51

## 2019-11-25 RX ADMIN — Medication 650 MILLIGRAM(S): at 06:30

## 2019-11-25 RX ADMIN — Medication 6: at 12:20

## 2019-11-25 RX ADMIN — PIPERACILLIN AND TAZOBACTAM 200 GRAM(S): 4; .5 INJECTION, POWDER, LYOPHILIZED, FOR SOLUTION INTRAVENOUS at 11:17

## 2019-11-25 RX ADMIN — Medication 50 GRAM(S): at 09:54

## 2019-11-25 RX ADMIN — Medication 4: at 08:26

## 2019-11-25 RX ADMIN — PIPERACILLIN AND TAZOBACTAM 200 GRAM(S): 4; .5 INJECTION, POWDER, LYOPHILIZED, FOR SOLUTION INTRAVENOUS at 05:49

## 2019-11-25 NOTE — DISCHARGE NOTE PROVIDER - HOSPITAL COURSE
73 yo m with hx HTN, HLD , DM and bladder tumor s/p TURBT 11/19 presented to the ER with sepsis 2/2 UTI. Per patient his potter was removed 1 day prior to presentation; at first he had a little trouble voiding but it improved over the course of the day. Then late the night before presentation patient began experiencing fever, chills, dysuria. In ED temp 38.4C, WBC: 13.1, Lactate: 4.4, Creat: 1.75.         He was admitted and treated with IV antibiotics with improvement in his symptoms. Urine culture grew E coli sensitive to ceftriaxone. On day of discharge, pt deemed stable and ready to return home with plan to follow up as an outpatient. 73 yo m with hx HTN, HLD , DM and bladder tumor s/p TURBT 11/19 presented to the ER with sepsis 2/2 UTI. Per patient his potter was removed 1 day prior to presentation; at first he had a little trouble voiding but it improved over the course of the day. Then late the night before presentation patient began experiencing fever, chills, dysuria. In ED temp 38.4C, WBC: 13.1, Lactate: 4.4, Creat: 1.75.         He was admitted and treated with IV antibiotics with improvement in his symptoms. Urine culture grew E coli sensitive to ceftriaxone. Blood cultures had no growth. On day of discharge, pt deemed stable and ready to return home with plan to follow up as an outpatient. 75 yo m with hx HTN, HLD , DM and bladder tumor s/p TURBT 11/19 presented to the ER with sepsis 2/2 UTI. Per patient his potter was removed 1 day prior to presentation; at first he had a little trouble voiding but it improved over the course of the day. Then late the night before presentation patient began experiencing fever, chills, dysuria. In ED temp 38.4C, WBC: 13.1, Lactate: 4.4, Creat: 1.75.         He was admitted and treated with IV antibiotics with improvement in his symptoms. Urine culture grew E coli sensitive to ceftriaxone. Blood cultures had no growth. On day of discharge, pt deemed stable and ready to return home with plan to continue PO abx and follow up as an outpatient.

## 2019-11-25 NOTE — PROGRESS NOTE ADULT - SUBJECTIVE AND OBJECTIVE BOX
INTERVAL HPI/OVERNIGHT EVENTS:  No acute events overnight.    VITALS:    T(F): 97.6 (11-25-19 @ 05:35), Max: 97.9 (11-24-19 @ 16:20)  HR: 50 (11-25-19 @ 05:35) (50 - 74)  BP: 160/80 (11-25-19 @ 05:35) (140/76 - 163/78)  RR: 20 (11-25-19 @ 05:35) (16 - 20)  SpO2: 98% (11-25-19 @ 05:35) (96% - 98%)  Wt(kg): --    I&O's Detail    23 Nov 2019 07:01  -  24 Nov 2019 07:00  --------------------------------------------------------  IN:    Oral Fluid: 120 mL  Total IN: 120 mL    OUT:    Indwelling Catheter - Urethral: 2250 mL  Total OUT: 2250 mL    Total NET: -2130 mL      24 Nov 2019 07:01  -  25 Nov 2019 06:06  --------------------------------------------------------  IN:    IV PiggyBack: 300 mL    Oral Fluid: 995 mL  Total IN: 1295 mL    OUT:    Voided: 2050 mL  Total OUT: 2050 mL    Total NET: -755 mL          MEDICATIONS:    ANTIBIOTICS:  piperacillin/tazobactam IVPB.. 3.375 Gram(s) IV Intermittent every 6 hours      PAIN CONTROL:  acetaminophen   Tablet .. 650 milliGRAM(s) Oral every 6 hours PRN  ondansetron Injectable 4 milliGRAM(s) IV Push every 6 hours PRN       MEDS:      HEME/ONC        PHYSICAL EXAM:  General: No acute distress.  Alert and Oriented  Abdominal Exam: soft, NT, ND   Exam: no SP discomfort      LABS:                        11.6   6.14  )-----------( 161      ( 24 Nov 2019 05:51 )             35.6     11-24    137  |  102  |  12  ----------------------------<  209<H>  4.4   |  26  |  1.17    Ca    9.0      24 Nov 2019 05:51  Phos  3.3     11-24  Mg     1.3     11-24            RADIOLOGY & ADDITIONAL TESTS:

## 2019-11-25 NOTE — DISCHARGE NOTE NURSING/CASE MANAGEMENT/SOCIAL WORK - PATIENT PORTAL LINK FT
You can access the FollowMyHealth Patient Portal offered by [facility] by registering at the following website: [email or web address]. By joining [employer]’s FollowMyHealth portal, you will also be able to view your health information using other applications (apps) compatible with our system.

## 2019-11-25 NOTE — DISCHARGE NOTE PROVIDER - CARE PROVIDER_API CALL
Phani Pereira)  Urology  [address] [place] [address]  Phone: [phone]  Fax: [phone]  Follow Up Time:

## 2019-11-25 NOTE — DISCHARGE NOTE PROVIDER - NSDCMRMEDTOKEN_GEN_ALL_CORE_FT
diabteic meds:     Jentadueto 2.5 mg-1000 mg oral tablet: 1 tab(s) orally 2 times a day  olmesartan 20 mg oral tablet: 1 tab(s) orally once a day  rosuvastatin 5 mg oral tablet: 1 tab(s) orally once a day (at bedtime) cefpodoxime 100 mg oral tablet: 1 tab(s) orally every 12 hours   diabteic meds:     Jentadueto 2.5 mg-1000 mg oral tablet: 1 tab(s) orally 2 times a day  olmesartan 20 mg oral tablet: 1 tab(s) orally once a day  rosuvastatin 5 mg oral tablet: 1 tab(s) orally once a day (at bedtime)  tamsulosin 0.4 mg oral capsule: 1 cap(s) orally once a day (at bedtime)

## 2019-11-25 NOTE — DISCHARGE NOTE PROVIDER - NSDCFUSCHEDAPPT_GEN_ALL_CORE_FT
VINNY SUGGS ; 01/22/2020 ; [facility] [address] [facility] ; [facility] [address] [facility] ; 02/05/2020 ; [facility] [address] [facility] ; 02/12/2020 ; [facility] [address] [facility] ; 02/19/2020 ; [facility] [address]  VINNY SUGGS ; 01/22/2020 ; [facility] [address] [facility] ; 01[facility]29/[facility] ; [facility] [address] [facility] ; 02/05/2020 ; [facility] [address] [facility] ; 02/12/2020 ; [facility] [address] [facility] ; 02/19/2020 ; \[facility] [place] [facility]\"" Urology [address]  VINNY SUGGS ; 01/22/2020 ; [facility] [address] [facility] ; 01/29/[facility] ; [facility] [address] [facility] ; 02/05/2020 ; [facility] [address] [facility] ; 02/12/2020 ; [facility] [address] [facility] ; 02/19/2020 ; [facility] [address]

## 2019-11-25 NOTE — DISCHARGE NOTE PROVIDER - NSDCFUADDINST_GEN_ALL_CORE_FT
General Discharge Instructions:  Please resume all regular home medications unless specifically advised not to take a particular medication. Also, please take any new medications as prescribed.  Please get plenty of rest, continue to ambulate several times per day, and drink adequate amounts of fluids.     Warning Signs:  Please call your doctor or nurse practitioner if you experience the following:  *You experience new chest pain, pressure, squeezing or tightness.  *New or worsening cough, shortness of breath, or wheeze.  *If you are vomiting and cannot keep down fluids or your medications.  *You are getting dehydrated due to continued vomiting, diarrhea, or other reasons. Signs of dehydration include dry mouth, rapid heartbeat, or feeling dizzy or faint when standing.  *You see blood or dark/black material when you vomit or have a bowel movement.  *You experience burning when you urinate, have blood in your urine, or experience a discharge.  *Your pain is not improving within 8-12 hours or is not gone within 24 hours. Call or return immediately if your pain is getting worse, changes location, or moves to your chest or back.  *You have shaking chills, or fever greater than 101.5 degrees Fahrenheit or 38 degrees Celsius.  *Any change in your symptoms, or any new symptoms that concern you.

## 2019-11-27 LAB
CULTURE RESULTS: SIGNIFICANT CHANGE UP
CULTURE RESULTS: SIGNIFICANT CHANGE UP
SPECIMEN SOURCE: SIGNIFICANT CHANGE UP
SPECIMEN SOURCE: SIGNIFICANT CHANGE UP

## 2019-11-29 DIAGNOSIS — Z79.84 LONG TERM (CURRENT) USE OF ORAL HYPOGLYCEMIC DRUGS: ICD-10-CM

## 2019-11-29 DIAGNOSIS — B96.20 UNSPECIFIED ESCHERICHIA COLI [E. COLI] AS THE CAUSE OF DISEASES CLASSIFIED ELSEWHERE: ICD-10-CM

## 2019-11-29 DIAGNOSIS — Z85.118 PERSONAL HISTORY OF OTHER MALIGNANT NEOPLASM OF BRONCHUS AND LUNG: ICD-10-CM

## 2019-11-29 DIAGNOSIS — N39.0 URINARY TRACT INFECTION, SITE NOT SPECIFIED: ICD-10-CM

## 2019-11-29 DIAGNOSIS — Z90.2 ACQUIRED ABSENCE OF LUNG [PART OF]: ICD-10-CM

## 2019-11-29 DIAGNOSIS — A41.9 SEPSIS, UNSPECIFIED ORGANISM: ICD-10-CM

## 2019-11-29 DIAGNOSIS — I10 ESSENTIAL (PRIMARY) HYPERTENSION: ICD-10-CM

## 2019-11-29 DIAGNOSIS — Z98.890 OTHER SPECIFIED POSTPROCEDURAL STATES: ICD-10-CM

## 2019-11-29 DIAGNOSIS — E11.9 TYPE 2 DIABETES MELLITUS WITHOUT COMPLICATIONS: ICD-10-CM

## 2019-11-29 DIAGNOSIS — Z90.6 ACQUIRED ABSENCE OF OTHER PARTS OF URINARY TRACT: ICD-10-CM

## 2019-11-29 DIAGNOSIS — E78.5 HYPERLIPIDEMIA, UNSPECIFIED: ICD-10-CM

## 2020-01-22 ENCOUNTER — APPOINTMENT (OUTPATIENT)
Dept: UROLOGY | Facility: CLINIC | Age: 75
End: 2020-01-22

## 2020-01-29 ENCOUNTER — RECORD ABSTRACTING (OUTPATIENT)
Age: 75
End: 2020-01-29

## 2020-01-29 ENCOUNTER — APPOINTMENT (OUTPATIENT)
Dept: UROLOGY | Facility: CLINIC | Age: 75
End: 2020-01-29
Payer: MEDICARE

## 2020-01-29 VITALS — TEMPERATURE: 97.9 F | DIASTOLIC BLOOD PRESSURE: 82 MMHG | HEART RATE: 71 BPM | SYSTOLIC BLOOD PRESSURE: 160 MMHG

## 2020-01-29 DIAGNOSIS — Z82.3 FAMILY HISTORY OF STROKE: ICD-10-CM

## 2020-01-29 DIAGNOSIS — Z83.6 FAMILY HISTORY OF OTHER DISEASES OF THE RESPIRATORY SYSTEM: ICD-10-CM

## 2020-01-29 DIAGNOSIS — Z00.00 ENCOUNTER FOR GENERAL ADULT MEDICAL EXAMINATION W/OUT ABNORMAL FINDINGS: ICD-10-CM

## 2020-01-29 LAB
BILIRUB UR QL STRIP: NORMAL
CLARITY UR: CLEAR
COLLECTION METHOD: NORMAL
GLUCOSE UR-MCNC: NORMAL
HCG UR QL: 0.2 EU/DL
HGB UR QL STRIP.AUTO: NORMAL
KETONES UR-MCNC: NORMAL
LEUKOCYTE ESTERASE UR QL STRIP: NORMAL
NITRITE UR QL STRIP: NORMAL
PH UR STRIP: 6
PROT UR STRIP-MCNC: 100
PSA FREE FLD-MCNC: 31
PSA FREE SERPL-MCNC: 1.1
PSA SERPL-MCNC: 3.5
SP GR UR STRIP: 1.02
TESTOST BND SERPL-MCNC: 405.5

## 2020-01-29 PROCEDURE — 51720 TREATMENT OF BLADDER LESION: CPT

## 2020-01-29 PROCEDURE — 99212 OFFICE O/P EST SF 10 MIN: CPT | Mod: 25

## 2020-01-29 PROCEDURE — 54450 PREPUTIAL STRETCHING: CPT | Mod: 59

## 2020-01-29 PROCEDURE — 81003 URINALYSIS AUTO W/O SCOPE: CPT | Mod: QW

## 2020-01-29 RX ORDER — ROSUVASTATIN CALCIUM 10 MG/1
10 TABLET, FILM COATED ORAL
Refills: 0 | Status: ACTIVE | COMMUNITY

## 2020-01-29 RX ORDER — OLMESARTAN MEDOXOMIL 20 MG/1
20 TABLET, FILM COATED ORAL DAILY
Refills: 0 | Status: ACTIVE | COMMUNITY

## 2020-01-29 RX ORDER — SITAGLIPTIN AND METFORMIN HYDROCHLORIDE 50; 1000 MG/1; MG/1
50-1000 TABLET, FILM COATED ORAL TWICE DAILY
Refills: 0 | Status: ACTIVE | COMMUNITY

## 2020-01-29 RX ORDER — GLIPIZIDE 5 MG/1
5 TABLET, FILM COATED, EXTENDED RELEASE ORAL
Refills: 0 | Status: ACTIVE | COMMUNITY

## 2020-01-29 NOTE — REVIEW OF SYSTEMS
[Shortness Of Breath] : shortness of breath [see HPI] : see HPI [Urinary Retention] : urinary retention [Urinary Stream Starts/Stops] : urinary stream starts and stops [Initiating Urination Req. Strain] : initiating urination requires straining [Negative] : Heme/Lymph

## 2020-01-29 NOTE — PHYSICAL EXAM
[FreeTextEntry1] : Testicular asymmetry (Rt >Lt).  Paraphimosis-reduced.  Mild annular foreskin scar/blanching.

## 2020-01-29 NOTE — ASSESSMENT
[FreeTextEntry1] : I discussed the findings and options with Mr. OSCAR SUGGS in detail. I manually reduced the paraphimosis and stressed the importance of maintaining the foreskin over the glans penis.\par \par Initially I placed a 10 Sam[place] catheter but was unable to get any urinary return.  This was then changed to a 14FR [place] catheter after confirming a significant residual on sonography.  Once the bladder was emptied I instilled 50ml [place] BCG atraumatically.  Mr. Suggs was discharged with the appropriate instructions and two 500mg Cipro tablets.  He will return next week for the next instillation. \par

## 2020-01-29 NOTE — HISTORY OF PRESENT ILLNESS
[FreeTextEntry1] : Mr. 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  (The delay in Mr. Otero beginning treatment was because he had planned to go to [facility] for the holidays). \par \par From his past urologic history Mr. Otero was first diagnosed with bladder cancer in July of 2010 with a recurrence in September 2010 and again in June of 2015, June of 2017 and May of 2018. He has always had high grade, non-muscle invasive disease. 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 Otero has moderate chronic voiding symptoms (obstructive and irritative) for which he is continuing on alfuzosin. He denies any acute changes including hematuria, dysuria or fever.  His post void residuals are elevated with a most recent value from October of this year being 121 cc. He also has erectile dysfunction but chooses not to have treatment for this. 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 \par \par PSAs:  6/4/19--3.5; 4/11/18--2.2; 12/6/17--3.26/17/16--1.9

## 2020-02-03 LAB — BACTERIA UR CULT: ABNORMAL

## 2020-02-05 ENCOUNTER — APPOINTMENT (OUTPATIENT)
Dept: UROLOGY | Facility: CLINIC | Age: 75
End: 2020-02-05
Payer: MEDICARE

## 2020-02-05 DIAGNOSIS — N47.2 PARAPHIMOSIS: ICD-10-CM

## 2020-02-05 PROCEDURE — 51720 TREATMENT OF BLADDER LESION: CPT

## 2020-02-05 PROCEDURE — 51798 US URINE CAPACITY MEASURE: CPT

## 2020-02-05 PROCEDURE — 99213 OFFICE O/P EST LOW 20 MIN: CPT | Mod: 25

## 2020-02-05 PROCEDURE — 81003 URINALYSIS AUTO W/O SCOPE: CPT | Mod: QW

## 2020-02-05 RX ORDER — OLMESARTAN MEDOXOMIL 40 MG/1
TABLET, FILM COATED ORAL
Refills: 0 | Status: DISCONTINUED | COMMUNITY
End: 2020-02-05

## 2020-02-06 LAB
BILIRUB UR QL STRIP: NORMAL
CLARITY UR: CLEAR
COLLECTION METHOD: NORMAL
GLUCOSE UR-MCNC: NORMAL
HCG UR QL: 0.2 EU/DL
HGB UR QL STRIP.AUTO: NORMAL
KETONES UR-MCNC: NORMAL
LEUKOCYTE ESTERASE UR QL STRIP: NORMAL
NITRITE UR QL STRIP: NORMAL
PH UR STRIP: 6
PROT UR STRIP-MCNC: NORMAL
SP GR UR STRIP: 1.02

## 2020-02-07 LAB — BACTERIA UR CULT: NORMAL

## 2020-02-12 ENCOUNTER — RESULT CHARGE (OUTPATIENT)
Age: 75
End: 2020-02-12

## 2020-02-12 ENCOUNTER — APPOINTMENT (OUTPATIENT)
Dept: UROLOGY | Facility: CLINIC | Age: 75
End: 2020-02-12
Payer: MEDICARE

## 2020-02-12 VITALS — DIASTOLIC BLOOD PRESSURE: 69 MMHG | SYSTOLIC BLOOD PRESSURE: 150 MMHG | TEMPERATURE: 97.6 F

## 2020-02-12 LAB
BILIRUB UR QL STRIP: NORMAL
CLARITY UR: CLEAR
COLLECTION METHOD: NORMAL
GLUCOSE UR-MCNC: NORMAL
HCG UR QL: 0.2 EU/DL
HGB UR QL STRIP.AUTO: NORMAL
KETONES UR-MCNC: NORMAL
LEUKOCYTE ESTERASE UR QL STRIP: NORMAL
NITRITE UR QL STRIP: NORMAL
PH UR STRIP: 6.5
PROT UR STRIP-MCNC: 30
SP GR UR STRIP: >=1.03

## 2020-02-12 PROCEDURE — 99213 OFFICE O/P EST LOW 20 MIN: CPT | Mod: 25

## 2020-02-12 PROCEDURE — 81003 URINALYSIS AUTO W/O SCOPE: CPT | Mod: QW

## 2020-02-12 PROCEDURE — 51720 TREATMENT OF BLADDER LESION: CPT

## 2020-02-12 RX ORDER — BACILLUS CALMETTE-GUERIN 50 MG/50ML
50 POWDER, FOR SUSPENSION INTRAVESICAL
Qty: 0 | Refills: 0 | Status: COMPLETED | OUTPATIENT
Start: 2020-02-12

## 2020-02-12 RX ADMIN — BACILLUS CALMETTE-GUERIN 0 MG: 50 POWDER, FOR SUSPENSION INTRAVESICAL at 00:00

## 2020-02-12 NOTE — HISTORY OF PRESENT ILLNESS
[FreeTextEntry1] : Mr. Otero returns for his second intravesical BCG instillation. He reports stable lower urinary tract symptoms without dysuria or hematuria. He has not experienced any fever or systemic complaints after the first instillation last week. He is completing the Macrobid prescribed.\par \par From his past urologic history Mr. Otero was first diagnosed with bladder cancer in July of 2010 with a recurrence in September 2010 and again in June of 2015, June of 2017 and May of 2018. He has always had high grade, non-muscle invasive disease. 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 Otero has moderate chronic voiding symptoms (obstructive and irritative) for which he is continuing on alfuzosin.  His post void residuals are elevated with a most recent prior value from October 2019 being 121 cc. He also has erectile dysfunction but chooses not to have treatment for this. 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 \par Sono:  274cc PVR.\par \par PSAs:  6/4/19--3.5; 4/11/18--2.2; 12/6/17--3.26/17/16--1.9

## 2020-02-12 NOTE — ASSESSMENT
[FreeTextEntry1] : I discussed the findings and options with Mr. OSCAR SUGGS in detail. \par \par I advanced a 12Fr coude [place] catheter and drained the bladder (200cc clear urine) Once the bladder was emptied I instilled 50ml [place] BCG atraumatically.  Mr. Suggs was discharged with the appropriate instructions  He will return next week for the next instillation. A repeat urine culture is pending. \par

## 2020-02-12 NOTE — PHYSICAL EXAM
[General Appearance - Well Developed] : well developed [General Appearance - Well Nourished] : well nourished [Normal Appearance] : normal appearance [Well Groomed] : well groomed [General Appearance - In No Acute Distress] : no acute distress [Abdomen Soft] : soft [Abdomen Tenderness] : non-tender [Abdomen Mass (___ Cm)] : no abdominal mass palpated [Costovertebral Angle Tenderness] : no ~M costovertebral angle tenderness [Urethral Meatus] : meatus normal [Penis Abnormality] : normal uncircumcised penis [Scrotum] : the scrotum was normal [Epididymis] : the epididymides were normal [Testes Tenderness] : no tenderness of the testes [Testes Mass (___cm)] : there were no testicular masses [Skin Color & Pigmentation] : normal skin color and pigmentation [Heart Rate And Rhythm] : Heart rate and rhythm were normal [Edema] : no peripheral edema [] : no respiratory distress [Respiration, Rhythm And Depth] : normal respiratory rhythm and effort [Exaggerated Use Of Accessory Muscles For Inspiration] : no accessory muscle use [Oriented To Time, Place, And Person] : oriented to person, place, and time [Affect] : the affect was normal [Mood] : the mood was normal [Not Anxious] : not anxious [Normal Station and Gait] : the gait and station were normal for the patient's age [No Focal Deficits] : no focal deficits [No Palpable Adenopathy] : no palpable adenopathy [FreeTextEntry1] : Testicular asymmetry (Rt >Lt).  Annular foreskin scar/blanching with mild phimosis.

## 2020-02-12 NOTE — HISTORY OF PRESENT ILLNESS
[FreeTextEntry1] : Mr. Otero returns for his third intravesical BCG instillation. He reports stable lower urinary tract symptoms without dysuria or hematuria. He has not experienced any fever or systemic complaints after the second instillation last week. \par \par From his past urologic history Mr. Otero was first diagnosed with bladder cancer in July of 2010 with a recurrence in September 2010 and again in June of 2015, June of 2017 and May of 2018. He has always had high grade, non-muscle invasive disease. 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 Otero has moderate chronic voiding symptoms (obstructive and irritative) for which he is continuing on alfuzosin.  His post void residuals are elevated with a most recent prior value from October 2019 being 121 cc. He also has erectile dysfunction but chooses not to have treatment for this. 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 \par Sono:  274cc PVR.\par \par PSAs:  6/4/19--3.5; 4/11/18--2.2; 12/6/17--3.26/17/16--1.9

## 2020-02-12 NOTE — ASSESSMENT
[FreeTextEntry1] : I discussed the findings and options with Mr. OSCAR SUGGS in detail. \par \par I advanced a 12Fr coude [place] catheter and drained the bladder. Once the bladder was emptied I instilled 50ml [place] BCG atraumatically.  Mr. Suggs was discharged with the appropriate instructions  He will return next week for the next instillation. A repeat urine culture is pending. \par

## 2020-02-12 NOTE — PHYSICAL EXAM
[General Appearance - Well Developed] : well developed [General Appearance - Well Nourished] : well nourished [Normal Appearance] : normal appearance [Well Groomed] : well groomed [General Appearance - In No Acute Distress] : no acute distress [Abdomen Soft] : soft [Abdomen Mass (___ Cm)] : no abdominal mass palpated [Abdomen Tenderness] : non-tender [Costovertebral Angle Tenderness] : no ~M costovertebral angle tenderness [Urethral Meatus] : meatus normal [Penis Abnormality] : normal uncircumcised penis [Scrotum] : the scrotum was normal [Epididymis] : the epididymides were normal [Testes Tenderness] : no tenderness of the testes [Testes Mass (___cm)] : there were no testicular masses [Skin Color & Pigmentation] : normal skin color and pigmentation [Heart Rate And Rhythm] : Heart rate and rhythm were normal [] : no respiratory distress [Edema] : no peripheral edema [Respiration, Rhythm And Depth] : normal respiratory rhythm and effort [Affect] : the affect was normal [Exaggerated Use Of Accessory Muscles For Inspiration] : no accessory muscle use [Oriented To Time, Place, And Person] : oriented to person, place, and time [Mood] : the mood was normal [Not Anxious] : not anxious [Normal Station and Gait] : the gait and station were normal for the patient's age [No Focal Deficits] : no focal deficits [No Palpable Adenopathy] : no palpable adenopathy [FreeTextEntry1] : Testicular asymmetry (Rt >Lt).  Annular foreskin scar/blanching with mild phimosis.

## 2020-02-14 LAB — BACTERIA UR CULT: NORMAL

## 2020-02-19 ENCOUNTER — APPOINTMENT (OUTPATIENT)
Dept: UROLOGY | Facility: CLINIC | Age: 75
End: 2020-02-19

## 2020-02-26 ENCOUNTER — APPOINTMENT (OUTPATIENT)
Dept: UROLOGY | Facility: CLINIC | Age: 75
End: 2020-02-26
Payer: MEDICARE

## 2020-02-26 ENCOUNTER — APPOINTMENT (OUTPATIENT)
Dept: UROLOGY | Facility: CLINIC | Age: 75
End: 2020-02-26

## 2020-02-26 VITALS — TEMPERATURE: 97.5 F | HEART RATE: 74 BPM | DIASTOLIC BLOOD PRESSURE: 71 MMHG | SYSTOLIC BLOOD PRESSURE: 152 MMHG

## 2020-02-26 LAB
BILIRUB UR QL STRIP: NORMAL
CLARITY UR: NORMAL
COLLECTION METHOD: NORMAL
GLUCOSE UR-MCNC: NORMAL
HCG UR QL: 0.2 EU/DL
HGB UR QL STRIP.AUTO: NORMAL
KETONES UR-MCNC: NORMAL
LEUKOCYTE ESTERASE UR QL STRIP: NORMAL
NITRITE UR QL STRIP: NORMAL
PH UR STRIP: 6
PROT UR STRIP-MCNC: 100
SP GR UR STRIP: 1.02

## 2020-02-26 PROCEDURE — 81003 URINALYSIS AUTO W/O SCOPE: CPT | Mod: QW

## 2020-02-26 PROCEDURE — 99213 OFFICE O/P EST LOW 20 MIN: CPT | Mod: 25

## 2020-02-26 PROCEDURE — 51720 TREATMENT OF BLADDER LESION: CPT

## 2020-02-26 RX ORDER — NITROFURANTOIN (MONOHYDRATE/MACROCRYSTALS) 25; 75 MG/1; MG/1
100 CAPSULE ORAL TWICE DAILY
Qty: 6 | Refills: 0 | Status: DISCONTINUED | COMMUNITY
Start: 2020-02-03 | End: 2020-02-26

## 2020-02-26 NOTE — PHYSICAL EXAM
[FreeTextEntry1] : Testicular asymmetry (Rt >Lt).  Annular foreskin scar/blanching with mild phimosis.

## 2020-02-26 NOTE — HISTORY OF PRESENT ILLNESS
[FreeTextEntry1] : Mr. Otero returns for his fourth intravesical BCG instillation. He reports stable LUTS without dysuria or hematuria. He has not experienced any fever, hematuria or systemic complaints after the third instillation two weeks ago. \par \par From his past urologic history Mr. Otero was first diagnosed with bladder cancer in July of 2010 with a recurrence in September 2010 and again in June of 2015, June of 2017 and May of 2018. He has always had high grade, non-muscle invasive disease. 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 Otero has moderate chronic voiding symptoms (obstructive and irritative) for which he is continuing on alfuzosin.  His post void residuals are elevated with a most recent prior value from October 2019 being 121 cc. He also has erectile dysfunction but chooses not to have treatment for this. 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 \par \par \par PSAs:  6/4/19--3.5; 4/11/18--2.2; 12/6/17--3.26/17/16--1.9

## 2020-02-26 NOTE — ASSESSMENT
[FreeTextEntry1] : I discussed the findings and options with Mr. OSCAR SUGGS in detail. \par \par I advanced a 12Fr coude [place] catheter and drained the bladder (220cc clear urine) Once the bladder was emptied I instilled 50ml [place] BCG atraumatically.  Mr. Suggs was discharged with the appropriate instructions  He will return next week for the fifth nstillation. A repeat urine culture is pending. \par

## 2020-02-28 LAB — BACTERIA UR CULT: NORMAL

## 2020-03-04 ENCOUNTER — APPOINTMENT (OUTPATIENT)
Dept: UROLOGY | Facility: CLINIC | Age: 75
End: 2020-03-04
Payer: MEDICARE

## 2020-03-04 LAB
BILIRUB UR QL STRIP: NORMAL
CLARITY UR: CLEAR
COLLECTION METHOD: NORMAL
GLUCOSE UR-MCNC: NORMAL
HCG UR QL: 0.2 EU/DL
HGB UR QL STRIP.AUTO: NORMAL
KETONES UR-MCNC: NORMAL
LEUKOCYTE ESTERASE UR QL STRIP: NORMAL
NITRITE UR QL STRIP: NORMAL
PH UR STRIP: 7
PROT UR STRIP-MCNC: 30
SP GR UR STRIP: 1.01

## 2020-03-04 PROCEDURE — 81003 URINALYSIS AUTO W/O SCOPE: CPT | Mod: QW

## 2020-03-04 PROCEDURE — 99213 OFFICE O/P EST LOW 20 MIN: CPT | Mod: 25

## 2020-03-04 PROCEDURE — 51720 TREATMENT OF BLADDER LESION: CPT

## 2020-03-04 RX ORDER — BACILLUS CALMETTE-GUERIN 50 MG/50ML
50 POWDER, FOR SUSPENSION INTRAVESICAL
Qty: 0 | Refills: 0 | Status: COMPLETED | OUTPATIENT
Start: 2020-03-04

## 2020-03-04 RX ORDER — ROSUVASTATIN CALCIUM 5 MG/1
TABLET, FILM COATED ORAL
Refills: 0 | Status: DISCONTINUED | COMMUNITY
End: 2020-03-04

## 2020-03-04 NOTE — ASSESSMENT
[FreeTextEntry1] : I discussed the findings and options with Mr. OSCAR SUGGS in detail. \par \par The bladder was emptied using a 12Fr coude [place] catheter (draining approximately 200ml clear yellow urine).  50ml [place] BCG were instilled and Mr. Suggs was discharged with the appropriate instructions.  He will return next week for the last instillation. A repeat urine culture is pending. \par

## 2020-03-04 NOTE — HISTORY OF PRESENT ILLNESS
[FreeTextEntry1] : Mr. Otero returns for his fifth intravesical BCG instillation. He reports stable LUTS without dysuria or  fever.  He did experience 2 days of gross painless hematuria after the fourth instillation last week, which resolved spontaneously.  \par \par From his past urologic history Mr. Otero was first diagnosed with bladder cancer in July of 2010 with a recurrence in September 2010 and again in June of 2015, June of 2017 and May of 2018. He has always had high grade, non-muscle invasive disease. 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 Otero has moderate chronic voiding symptoms (obstructive and irritative). He stopped the alfuzosin due to weakness, which he attributed to the medication. His post void residuals are elevated with a most recent prior value from October 2019 being 121 cc. He also has erectile dysfunction but chooses not to have treatment for this. 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 \par \par \par PSAs:  6/4/19--3.5; 4/11/18--2.2; 12/6/17--3.26/17/16--1.9

## 2020-03-04 NOTE — PHYSICAL EXAM
[General Appearance - Well Developed] : well developed [General Appearance - Well Nourished] : well nourished [Normal Appearance] : normal appearance [Well Groomed] : well groomed [General Appearance - In No Acute Distress] : no acute distress [Abdomen Soft] : soft [Abdomen Tenderness] : non-tender [Costovertebral Angle Tenderness] : no ~M costovertebral angle tenderness [Abdomen Mass (___ Cm)] : no abdominal mass palpated [Urethral Meatus] : meatus normal [Penis Abnormality] : normal uncircumcised penis [Scrotum] : the scrotum was normal [Testes Tenderness] : no tenderness of the testes [Epididymis] : the epididymides were normal [Testes Mass (___cm)] : there were no testicular masses [Skin Color & Pigmentation] : normal skin color and pigmentation [Heart Rate And Rhythm] : Heart rate and rhythm were normal [Edema] : no peripheral edema [] : no respiratory distress [Exaggerated Use Of Accessory Muscles For Inspiration] : no accessory muscle use [Respiration, Rhythm And Depth] : normal respiratory rhythm and effort [Mood] : the mood was normal [Affect] : the affect was normal [Oriented To Time, Place, And Person] : oriented to person, place, and time [Not Anxious] : not anxious [Normal Station and Gait] : the gait and station were normal for the patient's age [No Focal Deficits] : no focal deficits [No Palpable Adenopathy] : no palpable adenopathy [FreeTextEntry1] : Testicular asymmetry (Rt >Lt).  Annular foreskin scar/blanching with phimosis.

## 2020-03-08 LAB — BACTERIA UR CULT: ABNORMAL

## 2020-03-11 ENCOUNTER — APPOINTMENT (OUTPATIENT)
Dept: UROLOGY | Facility: CLINIC | Age: 75
End: 2020-03-11
Payer: MEDICARE

## 2020-03-11 DIAGNOSIS — I10 ESSENTIAL (PRIMARY) HYPERTENSION: ICD-10-CM

## 2020-03-11 LAB
BILIRUB UR QL STRIP: NORMAL
CLARITY UR: CLEAR
COLLECTION METHOD: NORMAL
GLUCOSE UR-MCNC: NORMAL
HCG UR QL: 0.2 EU/DL
HGB UR QL STRIP.AUTO: NORMAL
KETONES UR-MCNC: NORMAL
LEUKOCYTE ESTERASE UR QL STRIP: NORMAL
NITRITE UR QL STRIP: NORMAL
PH UR STRIP: 7
PROT UR STRIP-MCNC: 300
SP GR UR STRIP: 1.02

## 2020-03-11 PROCEDURE — 99213 OFFICE O/P EST LOW 20 MIN: CPT | Mod: 25

## 2020-03-11 PROCEDURE — 76857 US EXAM PELVIC LIMITED: CPT

## 2020-03-11 PROCEDURE — 81003 URINALYSIS AUTO W/O SCOPE: CPT | Mod: QW

## 2020-03-11 PROCEDURE — 51720 TREATMENT OF BLADDER LESION: CPT

## 2020-03-11 NOTE — PHYSICAL EXAM
[General Appearance - Well Developed] : well developed [General Appearance - Well Nourished] : well nourished [Normal Appearance] : normal appearance [Well Groomed] : well groomed [General Appearance - In No Acute Distress] : no acute distress [Abdomen Soft] : soft [Abdomen Tenderness] : non-tender [Abdomen Mass (___ Cm)] : no abdominal mass palpated [Costovertebral Angle Tenderness] : no ~M costovertebral angle tenderness [Urethral Meatus] : meatus normal [Penis Abnormality] : normal uncircumcised penis [Scrotum] : the scrotum was normal [Epididymis] : the epididymides were normal [Testes Tenderness] : no tenderness of the testes [Testes Mass (___cm)] : there were no testicular masses [Skin Color & Pigmentation] : normal skin color and pigmentation [Heart Rate And Rhythm] : Heart rate and rhythm were normal [Edema] : no peripheral edema [] : no respiratory distress [Respiration, Rhythm And Depth] : normal respiratory rhythm and effort [Exaggerated Use Of Accessory Muscles For Inspiration] : no accessory muscle use [Oriented To Time, Place, And Person] : oriented to person, place, and time [Affect] : the affect was normal [Mood] : the mood was normal [Not Anxious] : not anxious [Normal Station and Gait] : the gait and station were normal for the patient's age [No Focal Deficits] : no focal deficits [No Palpable Adenopathy] : no palpable adenopathy [FreeTextEntry1] : Testicular asymmetry (Rt >Lt).  Annular foreskin scar/blanching with phimosis.

## 2020-03-11 NOTE — ASSESSMENT
[FreeTextEntry1] : I discussed the findings and options with Mr. OSCAR SUGGS in detail. \par \par The bladder was emptied using a 12Fr coude [place] catheter.  50ml [place] BCG were instilled and Mr. Suggs was discharged with the appropriate instructions.  He will return in 6-8 weeks for a cystoscopy.  A repeat urine culture is pending. \par

## 2020-03-11 NOTE — LETTER BODY
[Dear  ___] : Dear  [unfilled], [Consult Letter:] : I had the pleasure of evaluating your patient, [unfilled]. [Please see my note below.] : Please see my note below. [Consult Closing:] : Thank you very much for allowing me to participate in the care of this patient.  If you have any questions, please do not hesitate to contact me. [Sincerely,] : Sincerely, [DrReginald  ___] : Dr. VERMA [FreeTextEntry3] : Phani Pereira MD, FACS\par

## 2020-03-11 NOTE — HISTORY OF PRESENT ILLNESS
[FreeTextEntry1] : Mr. Otero returns for his sixth intravesical BCG instillation. He reports stable LUTS without dysuria or  fever.  He is completing the Levaquin prescribed earlier this week for an asymptomatic UTI (10-49,000 Enterococcus faecalis). \par \par From his past urologic history Mr. Otero was first diagnosed with bladder cancer in July of 2010 with a recurrence in September 2010 and again in June of 2015, June of 2017 and May of 2018. He has always had high grade, non-muscle invasive disease. 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 Otero has moderate chronic voiding symptoms (obstructive and irritative). He stopped the alfuzosin due to weakness, which he attributed to the medication. His post void residuals are elevated with a most recent prior value from Feb 5th being 274cc. He also has erectile dysfunction but chooses not to have treatment for this. 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 \par Sono:  159cc PVR; 32cc prostate\par \par \par PSAs:  6/4/19--3.5; 4/11/18--2.2; 12/6/17--3.26/17/16--1.9

## 2020-03-13 LAB — BACTERIA UR CULT: NORMAL

## 2020-03-23 ENCOUNTER — RX RENEWAL (OUTPATIENT)
Age: 75
End: 2020-03-23

## 2020-04-29 ENCOUNTER — APPOINTMENT (OUTPATIENT)
Dept: UROLOGY | Facility: CLINIC | Age: 75
End: 2020-04-29

## 2020-06-01 ENCOUNTER — APPOINTMENT (OUTPATIENT)
Dept: UROLOGY | Facility: CLINIC | Age: 75
End: 2020-06-01
Payer: MEDICARE

## 2020-06-01 VITALS
TEMPERATURE: 97.4 F | DIASTOLIC BLOOD PRESSURE: 71 MMHG | OXYGEN SATURATION: 98 % | HEART RATE: 64 BPM | SYSTOLIC BLOOD PRESSURE: 150 MMHG

## 2020-06-01 LAB
BILIRUB UR QL STRIP: NORMAL
CLARITY UR: CLEAR
COLLECTION METHOD: NORMAL
GLUCOSE UR-MCNC: NORMAL
HCG UR QL: 0.2 EU/DL
HGB UR QL STRIP.AUTO: NORMAL
KETONES UR-MCNC: NORMAL
LEUKOCYTE ESTERASE UR QL STRIP: NORMAL
NITRITE UR QL STRIP: NORMAL
PH UR STRIP: 6.5
PROT UR STRIP-MCNC: NORMAL
SP GR UR STRIP: 1.02

## 2020-06-01 PROCEDURE — 52281 CYSTOSCOPY AND TREATMENT: CPT

## 2020-06-01 PROCEDURE — 81003 URINALYSIS AUTO W/O SCOPE: CPT | Mod: QW

## 2020-06-01 PROCEDURE — 76857 US EXAM PELVIC LIMITED: CPT

## 2020-06-01 PROCEDURE — 99215 OFFICE O/P EST HI 40 MIN: CPT | Mod: 25

## 2020-06-01 RX ORDER — LEVOFLOXACIN 500 MG/1
500 TABLET, FILM COATED ORAL DAILY
Qty: 3 | Refills: 0 | Status: DISCONTINUED | COMMUNITY
Start: 2020-03-09 | End: 2020-06-01

## 2020-06-01 NOTE — PHYSICAL EXAM
WE R/S APPT FOR 4-16-20 W/ JMP.    PLEASE RF RX -PT USES [facility] ON [address]   [General Appearance - Well Nourished] : well nourished [General Appearance - Well Developed] : well developed [Normal Appearance] : normal appearance [Well Groomed] : well groomed [General Appearance - In No Acute Distress] : no acute distress [Abdomen Soft] : soft [Abdomen Tenderness] : non-tender [Abdomen Mass (___ Cm)] : no abdominal mass palpated [Costovertebral Angle Tenderness] : no ~M costovertebral angle tenderness [Urethral Meatus] : meatus normal [Penis Abnormality] : normal uncircumcised penis [Scrotum] : the scrotum was normal [Epididymis] : the epididymides were normal [Testes Mass (___cm)] : there were no testicular masses [Testes Tenderness] : no tenderness of the testes [Skin Color & Pigmentation] : normal skin color and pigmentation [Heart Rate And Rhythm] : Heart rate and rhythm were normal [Edema] : no peripheral edema [] : no respiratory distress [Respiration, Rhythm And Depth] : normal respiratory rhythm and effort [Exaggerated Use Of Accessory Muscles For Inspiration] : no accessory muscle use [Oriented To Time, Place, And Person] : oriented to person, place, and time [Affect] : the affect was normal [Mood] : the mood was normal [Not Anxious] : not anxious [Normal Station and Gait] : the gait and station were normal for the patient's age [No Focal Deficits] : no focal deficits [No Palpable Adenopathy] : no palpable adenopathy [Prostate Tenderness] : the prostate was not tender [FreeTextEntry1] : Testicular asymmetry (Rt >Lt).  Annular foreskin scar/blanching with phimosis. Nodular prostate.

## 2020-06-01 NOTE — ASSESSMENT
[FreeTextEntry1] : I discussed the findings and options with . OSCAR SUGGS and his nephew in detail. Today's cysto was visually normal.  We will add Proscar to the Uroxatral as the next step to improve the retention and LUTS.  We also discussed behavioral modification. Regarding the bladder CIS, he does not want to pursue maintenance therapy despite data suggesting this may decrease recurrences.\par \par Providing the PSA is stable and there are no new problems, I would like to see Mr. Suggs in 3mths for his next interval cysto and followup sonogram. \par \par I have also ordered a CT chest/abd/pelvis as well as blood tests.\par \par \par

## 2020-06-01 NOTE — HISTORY OF PRESENT ILLNESS
[FreeTextEntry1] : Mr. Otero returns for his interval cystoscopy after completing 6 weeks of intravesical BCG in March 2020.  He was first diagnosed with bladder cancer in July of 2010 and has had multiple recurrences, most recently Nov 2019 (see below). He completed an initial 6 week course of BCG in the summer of 2018.  \par \par Currently Mr. Otero has moderate chronic voiding symptoms (obstructive and irritative) with nocturia x 4. He denies hematuria, dysuria or fever. He has been on alfuzosin.  His post void residuals have been elevated to <200cc.\par He has a proximal bulbar urethral stricture which calibrates to approximately 16 or 18 [place]. \par IPSS: 17/35\par Sono: 285cc PVR (42cc tic); 44cc prostate\par \par Mr. Otero also has erectile dysfunction but chooses not to have treatment for this. \par \par \par PSAs:  6/4/19--3.5; 4/11/18--2.2; 12/6/17--3.2; 6/17/16--1.9\par \par TURBTs:  11/19/19--CIS (diverticular wall bx); 5/15/18--Ta,G3 and CIS; 6/13/17--TaG3; 6/23/15--TaG3; 6/3/13--Neg; 9/21/10--Neg; 9/3/10--T1G3;  7/1/10--T1G3.\par \par CT Urogram:  4/24/18--5mm anterior enhancing bladder nodule.  Left-sided diverticulum. Bilat renal cysts. Large right inguinal hernia.  Colonic diverticula.

## 2020-06-03 LAB
ALBUMIN SERPL ELPH-MCNC: 4.8 G/DL
ALP BLD-CCNC: 40 U/L
ALT SERPL-CCNC: 16 U/L
ANION GAP SERPL CALC-SCNC: 15 MMOL/L
AST SERPL-CCNC: 17 U/L
BASOPHILS # BLD AUTO: 0.04 K/UL
BASOPHILS NFR BLD AUTO: 0.7 %
BILIRUB SERPL-MCNC: 0.2 MG/DL
BUN SERPL-MCNC: 17 MG/DL
CALCIUM SERPL-MCNC: 9.6 MG/DL
CHLORIDE SERPL-SCNC: 96 MMOL/L
CO2 SERPL-SCNC: 23 MMOL/L
CREAT SERPL-MCNC: 0.97 MG/DL
EOSINOPHIL # BLD AUTO: 0.13 K/UL
EOSINOPHIL NFR BLD AUTO: 2.4 %
ESTIMATED AVERAGE GLUCOSE: 131 MG/DL
GLUCOSE SERPL-MCNC: 157 MG/DL
HBA1C MFR BLD HPLC: 6.2 %
HCT VFR BLD CALC: 41.5 %
HGB BLD-MCNC: 13.2 G/DL
IMM GRANULOCYTES NFR BLD AUTO: 0.2 %
LYMPHOCYTES # BLD AUTO: 1.7 K/UL
LYMPHOCYTES NFR BLD AUTO: 31.2 %
MAN DIFF?: NORMAL
MCHC RBC-ENTMCNC: 31.7 PG
MCHC RBC-ENTMCNC: 31.8 GM/DL
MCV RBC AUTO: 99.5 FL
MONOCYTES # BLD AUTO: 0.44 K/UL
MONOCYTES NFR BLD AUTO: 8.1 %
NEUTROPHILS # BLD AUTO: 3.13 K/UL
NEUTROPHILS NFR BLD AUTO: 57.4 %
PLATELET # BLD AUTO: 153 K/UL
POTASSIUM SERPL-SCNC: 4.9 MMOL/L
PROT SERPL-MCNC: 7.2 G/DL
PSA SERPL-MCNC: 3.33 NG/ML
RBC # BLD: 4.17 M/UL
RBC # FLD: 15.1 %
SODIUM SERPL-SCNC: 134 MMOL/L
WBC # FLD AUTO: 5.45 K/UL

## 2020-06-04 LAB — URINE CYTOLOGY: NORMAL

## 2020-06-08 LAB — BACTERIA UR CULT: ABNORMAL

## 2020-06-11 NOTE — ASU PREOP CHECKLIST - ALLERGIES REVIEWED
Patient is requesting an appointment to be seen for the followin. A Mild sore throat that she's had for 2 weeks and is beginning to become concerning. 2.  It's time for her follow up for medication refills. 3.  Her legs are getting stiff, and she thinks that's really weird, and is becoming concerned. Patient is off tomorrow, and is available to do a VV. Please advise. Please contact patient @ phone # provided. done

## 2020-06-17 ENCOUNTER — RX RENEWAL (OUTPATIENT)
Age: 75
End: 2020-06-17

## 2020-10-15 NOTE — LETTER BODY
[Dear  ___] : Dear  [unfilled], [Please see my note below.] : Please see my note below. [Consult Closing:] : Thank you very much for allowing me to participate in the care of this patient.  If you have any questions, please do not hesitate to contact me. [Sincerely,] : Sincerely, [FreeTextEntry3] : Phani Pereira MD, FACS\par  [DrReginald  ___] : Dr. VERMA

## 2020-10-15 NOTE — HISTORY OF PRESENT ILLNESS
[FreeTextEntry1] : Mr. Otero returns for his interval cystoscopy after completing 6 weeks of intravesical BCG in March 2020.  He was first diagnosed with bladder cancer in July of 2010 and has had multiple recurrences, most recently Nov 2019 (see below). He completed an initial 6 week course of BCG in the summer of 2018.  \par \par Currently Mr. Otero has moderate chronic voiding symptoms (obstructive and irritative) with nocturia x 4. He denies hematuria, dysuria or fever. He has been on alfuzosin.  His post void residuals have been elevated to <200cc.\par He has a proximal bulbar urethral stricture which calibrates to approximately 16 or 18 Swedish. \par IPSS: \par Sono:     PVR; \par \par Mr. Otero also has erectile dysfunction but chooses not to have treatment for this. \par \par \par PSAs:  6/4/19--3.5; 4/11/18--2.2; 12/6/17--3.2; 6/17/16--1.9\par \par TURBTs:  11/19/19--CIS (diverticular wall bx); 5/15/18--Ta,G3 and CIS; 6/13/17--TaG3; 6/23/15--TaG3; 6/3/13--Neg; 9/21/10--Neg; 9/3/10--T1G3;  7/1/10--T1G3.\par \par CT Urogram:  4/24/18--5mm anterior enhancing bladder nodule.  Left-sided diverticulum. Bilat renal cysts. Large right inguinal hernia.  Colonic diverticula.

## 2020-10-15 NOTE — PHYSICAL EXAM
[General Appearance - Well Developed] : well developed [General Appearance - Well Nourished] : well nourished [Normal Appearance] : normal appearance [Well Groomed] : well groomed [General Appearance - In No Acute Distress] : no acute distress [Abdomen Soft] : soft [Abdomen Tenderness] : non-tender [Costovertebral Angle Tenderness] : no ~M costovertebral angle tenderness [Abdomen Mass (___ Cm)] : no abdominal mass palpated [Urethral Meatus] : meatus normal [Penis Abnormality] : normal uncircumcised penis [Scrotum] : the scrotum was normal [Epididymis] : the epididymides were normal [Testes Tenderness] : no tenderness of the testes [Testes Mass (___cm)] : there were no testicular masses [Prostate Tenderness] : the prostate was not tender [FreeTextEntry1] : Testicular asymmetry (Rt >Lt).  Annular foreskin scar/blanching with phimosis. Nodular prostate. [Skin Color & Pigmentation] : normal skin color and pigmentation [Heart Rate And Rhythm] : Heart rate and rhythm were normal [Edema] : no peripheral edema [] : no respiratory distress [Respiration, Rhythm And Depth] : normal respiratory rhythm and effort [Exaggerated Use Of Accessory Muscles For Inspiration] : no accessory muscle use [Oriented To Time, Place, And Person] : oriented to person, place, and time [Affect] : the affect was normal [Mood] : the mood was normal [Not Anxious] : not anxious [Normal Station and Gait] : the gait and station were normal for the patient's age [No Focal Deficits] : no focal deficits [No Palpable Adenopathy] : no palpable adenopathy

## 2020-10-15 NOTE — ADDENDUM
[FreeTextEntry1] : A portion of this note was written by [Rajeev Moreau] on 10/15/2020 acting as a scribe for Dr. Pereira.\par \par I have personally reviewed the chart and agree that the record accurately reflects my personal performance of the history, physical exam, assessment and plan.

## 2020-10-15 NOTE — REVIEW OF SYSTEMS
[Shortness Of Breath] : shortness of breath [Urinary Retention] : urinary retention [see HPI] : see HPI [Urinary Stream Starts/Stops] : urinary stream starts and stops [Initiating Urination Req. Strain] : initiating urination requires straining [Negative] : Heme/Lymph

## 2020-10-16 ENCOUNTER — APPOINTMENT (OUTPATIENT)
Dept: UROLOGY | Facility: CLINIC | Age: 75
End: 2020-10-16
Payer: MEDICARE

## 2020-10-16 VITALS — DIASTOLIC BLOOD PRESSURE: 82 MMHG | HEART RATE: 62 BPM | SYSTOLIC BLOOD PRESSURE: 183 MMHG | TEMPERATURE: 97.9 F

## 2020-10-16 DIAGNOSIS — Z87.448 PERSONAL HISTORY OF OTHER DISEASES OF URINARY SYSTEM: ICD-10-CM

## 2020-10-16 LAB
BILIRUB UR QL STRIP: NORMAL
CLARITY UR: CLEAR
COLLECTION METHOD: NORMAL
GLUCOSE UR-MCNC: NORMAL
HCG UR QL: 0.2 EU/DL
HGB UR QL STRIP.AUTO: NORMAL
KETONES UR-MCNC: NORMAL
LEUKOCYTE ESTERASE UR QL STRIP: NORMAL
NITRITE UR QL STRIP: NORMAL
PH UR STRIP: 6
PROT UR STRIP-MCNC: 30
SP GR UR STRIP: 1.02

## 2020-10-16 PROCEDURE — 81003 URINALYSIS AUTO W/O SCOPE: CPT | Mod: QW

## 2020-10-16 PROCEDURE — 99215 OFFICE O/P EST HI 40 MIN: CPT | Mod: 25

## 2020-10-16 PROCEDURE — 52281 CYSTOSCOPY AND TREATMENT: CPT

## 2020-10-16 PROCEDURE — 76857 US EXAM PELVIC LIMITED: CPT

## 2020-10-16 NOTE — LETTER BODY
[Please see my note below.] : Please see my note below. [Dear  ___] : Dear  [unfilled], [Consult Closing:] : Thank you very much for allowing me to participate in the care of this patient.  If you have any questions, please do not hesitate to contact me. [Sincerely,] : Sincerely, [FreeTextEntry3] : Phani Pereira MD, FACS\par  [DrReginald  ___] : Dr. VERMA

## 2020-10-16 NOTE — ASSESSMENT
[FreeTextEntry1] : I discussed the findings and options with Mr. OSCAR SUGGS and his nephew in detail. I did not not identify any recurrent bladder tumors on cystoscopy, although visualization was somewhat limited at the base of the bladder because of the significant debris.  I reminded Mr. Suggs that he should add finasteride to the alfuzosin and increase his water intake.  He is not interested in pursuing surgical options for the urinary retention/BPH at this time.  Similarly he does not want to have a herniorrhaphy until the COVID-19 pandemic has resolved.\par \par Providing there are no new problems, I would like to see Mrs. Suggs in 3 months (bladder sono, cystoscopy)\par \par

## 2020-10-16 NOTE — LETTER BODY
[Please see my note below.] : Please see my note below. [Dear  ___] : Dear  [unfilled], [Sincerely,] : Sincerely, [Consult Closing:] : Thank you very much for allowing me to participate in the care of this patient.  If you have any questions, please do not hesitate to contact me. [FreeTextEntry3] : Phani Pereira MD, FACS\par  [DrReginald  ___] : Dr. VERMA

## 2020-10-16 NOTE — REVIEW OF SYSTEMS
[Shortness Of Breath] : shortness of breath [see HPI] : see HPI [Urinary Retention] : urinary retention [Initiating Urination Req. Strain] : initiating urination requires straining [Urinary Stream Starts/Stops] : urinary stream starts and stops [Negative] : Heme/Lymph

## 2020-10-16 NOTE — PHYSICAL EXAM
[General Appearance - Well Developed] : well developed [Normal Appearance] : normal appearance [Well Groomed] : well groomed [General Appearance - Well Nourished] : well nourished [Abdomen Soft] : soft [General Appearance - In No Acute Distress] : no acute distress [Costovertebral Angle Tenderness] : no ~M costovertebral angle tenderness [Abdomen Mass (___ Cm)] : no abdominal mass palpated [Abdomen Tenderness] : non-tender [Urethral Meatus] : meatus normal [Penis Abnormality] : normal uncircumcised penis [Testes Mass (___cm)] : there were no testicular masses [Scrotum] : the scrotum was normal [Epididymis] : the epididymides were normal [Testes Tenderness] : no tenderness of the testes [Skin Color & Pigmentation] : normal skin color and pigmentation [Prostate Tenderness] : the prostate was not tender [FreeTextEntry1] : Testicular asymmetry (Rt >Lt).  Annular foreskin scar/blanching with phimosis--likely LSA Nodular prostate. [Heart Rate And Rhythm] : Heart rate and rhythm were normal [] : no respiratory distress [Edema] : no peripheral edema [Exaggerated Use Of Accessory Muscles For Inspiration] : no accessory muscle use [Respiration, Rhythm And Depth] : normal respiratory rhythm and effort [Oriented To Time, Place, And Person] : oriented to person, place, and time [Mood] : the mood was normal [Affect] : the affect was normal [Not Anxious] : not anxious [Normal Station and Gait] : the gait and station were normal for the patient's age [No Palpable Adenopathy] : no palpable adenopathy [No Focal Deficits] : no focal deficits

## 2020-10-16 NOTE — HISTORY OF PRESENT ILLNESS
[FreeTextEntry1] : Mr. Otero returns for followup, including the interval cystoscopy for which he is overdue. Earlier this week he was found to have microhematuria on a routine urinalysis. He denies gross hematuria but has been passing debris per urethra. \par \par Mr. 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 He completed two 6-week induction courses of BCG (summer 2018 and March 2020). \par \par Currently Mr. Otero reports moderate stable chronic voiding symptoms (obstructive and irritative) with nocturia x 4. He denies hematuria, dysuria or fever. His post void residuals have been elevated to <200cc. He is continuing only on alfuzosin and did not take the finasteride previously prescribed.\par Sono: 284cc PVR\par \par Mr. Otero also has erectile dysfunction but chooses not to have treatment for this. \par \par PSAs: 6/3/20--3.3; 6/4/19--3.5; 4/11/18--2.2; 12/6/17--3.2; 6/17/16--1.9\par \par TURBTs: 11/19/19--CIS (diverticular wall bx); 5/15/18--Ta,G3 and CIS; 6/13/17--TaG3; 6/23/15--TaG3; 6/3/13--Neg; 9/21/10--Neg; 9/3/10--T1G3; 7/1/10--T1G3.\par \par CT Urogram: 6/17/20--mild diffuse circumferential mural thickening of the bladder with a large left-sided diverticulum. No discrete bladder mass. Large right inguinal hernia; 4/24/18--5mm anterior enhancing bladder nodule. Left-sided diverticulum. Bilat renal cysts. Large right inguinal hernia. Colonic diverticula.

## 2020-10-19 LAB
BACTERIA UR CULT: NORMAL
URINE CYTOLOGY: NORMAL

## 2020-10-20 LAB — HLX UV FISH FINAL REPORT: NORMAL

## 2021-02-10 ENCOUNTER — APPOINTMENT (OUTPATIENT)
Dept: UROLOGY | Facility: CLINIC | Age: 76
End: 2021-02-10

## 2021-03-05 ENCOUNTER — APPOINTMENT (OUTPATIENT)
Dept: UROLOGY | Facility: CLINIC | Age: 76
End: 2021-03-05

## 2021-03-10 ENCOUNTER — APPOINTMENT (OUTPATIENT)
Dept: UROLOGY | Facility: CLINIC | Age: 76
End: 2021-03-10

## 2021-03-11 ENCOUNTER — APPOINTMENT (OUTPATIENT)
Dept: UROLOGY | Facility: CLINIC | Age: 76
End: 2021-03-11
Payer: MEDICARE

## 2021-03-11 DIAGNOSIS — R31.29 OTHER MICROSCOPIC HEMATURIA: ICD-10-CM

## 2021-03-11 LAB
BILIRUB UR QL STRIP: NORMAL
CLARITY UR: NORMAL
COLLECTION METHOD: NORMAL
GLUCOSE UR-MCNC: NORMAL
HCG UR QL: 0.2 EU/DL
HGB UR QL STRIP.AUTO: NORMAL
KETONES UR-MCNC: NORMAL
LEUKOCYTE ESTERASE UR QL STRIP: NORMAL
NITRITE UR QL STRIP: NORMAL
PH UR STRIP: NORMAL
PROT UR STRIP-MCNC: NORMAL
SP GR UR STRIP: 1.01

## 2021-03-11 PROCEDURE — 99214 OFFICE O/P EST MOD 30 MIN: CPT | Mod: 25

## 2021-03-11 PROCEDURE — 81003 URINALYSIS AUTO W/O SCOPE: CPT | Mod: QW

## 2021-03-11 PROCEDURE — 52224 CYSTOSCOPY AND TREATMENT: CPT

## 2021-03-11 NOTE — ASSESSMENT
[FreeTextEntry1] : I discussed the findings and options with Mr. OSCAR SUGGS and his nephew in detail. I will call him with the bladder biopsy result taken in the office today.  Depending on the findings,  he will either return in 3 or 6 months.\par \par In the meantime, Mr. Suggs will continue on the combination therapy for his voiding symptoms.\par \par He does not want any intervention for the right inguinal hernia or the phimosis.\par \par Providing there are no new problems, I would like to see Mrs. Suggs in 3 months (bladder sono, cystoscopy)\par \par

## 2021-03-11 NOTE — ADDENDUM
[FreeTextEntry1] : A portion of this note was written by [Miller Anna] on 02/05/2021 acting as a scribe for Dr. Pereira. \par \par I have personally reviewed the chart and agree that the record accurately reflects my personal performance of the history, physical exam, assessment, and plan.

## 2021-03-11 NOTE — PHYSICAL EXAM
[FreeTextEntry1] : Testicular asymmetry (Rt >Lt).  Annular foreskin scar/blanching with phimosis--likely LSA Nodular prostate.

## 2021-03-11 NOTE — HISTORY OF PRESENT ILLNESS
[FreeTextEntry1] : Mr. Otero returns for followup, including the interval cystoscopy.  \par \par Mr. 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 He completed two 6-week induction courses of BCG (summer 2018 and March 2020). \par \par Currently Mr. Otero reports moderate stable chronic voiding symptoms (obstructive and irritative) with nocturia x 4. He denies hematuria, dysuria or fever. His post void residuals have been elevated to <200cc. He is continuing only on alfuzosin and finasteride.\par Sono: 200cc PVR; 3.3x3.2cm bladder diverticulum; 45cc prostate\par \par Mr. Otero also has erectile dysfunction but chooses not to have treatment for this. \par \par PSAs: 6/3/20--3.3; 6/4/19--3.5; 4/11/18--2.2; 12/6/17--3.2; 6/17/16--1.9\par \par TURBTs: 11/19/19--CIS (diverticular wall bx); 5/15/18--Ta,G3 and CIS; 6/13/17--TaG3; 6/23/15--TaG3; 6/3/13--Neg; 9/21/10--Neg; 9/3/10--T1G3; 7/1/10--T1G3.\par \par CT Urogram: 6/17/20--mild diffuse circumferential mural thickening of the bladder with a large left-sided diverticulum. No discrete bladder mass. Large right inguinal hernia; 4/24/18--5mm anterior enhancing bladder nodule. Left-sided diverticulum. Bilat renal cysts. Large right inguinal hernia. Colonic diverticula.

## 2021-03-12 ENCOUNTER — LABORATORY RESULT (OUTPATIENT)
Age: 76
End: 2021-03-12

## 2021-03-15 ENCOUNTER — NON-APPOINTMENT (OUTPATIENT)
Age: 76
End: 2021-03-15

## 2021-03-15 LAB — BACTERIA UR CULT: NORMAL

## 2021-03-16 LAB — URINE CYTOLOGY: NORMAL

## 2021-07-26 ENCOUNTER — RX RENEWAL (OUTPATIENT)
Age: 76
End: 2021-07-26

## 2021-10-18 ENCOUNTER — APPOINTMENT (OUTPATIENT)
Dept: UROLOGY | Facility: CLINIC | Age: 76
End: 2021-10-18
Payer: MEDICARE

## 2021-10-18 VITALS — DIASTOLIC BLOOD PRESSURE: 69 MMHG | SYSTOLIC BLOOD PRESSURE: 146 MMHG | TEMPERATURE: 97.6 F | HEART RATE: 78 BPM

## 2021-10-18 PROCEDURE — 52281 CYSTOSCOPY AND TREATMENT: CPT

## 2021-10-18 PROCEDURE — 99215 OFFICE O/P EST HI 40 MIN: CPT | Mod: 25

## 2021-10-18 PROCEDURE — 76857 US EXAM PELVIC LIMITED: CPT

## 2021-10-18 NOTE — PHYSICAL EXAM
[General Appearance - Well Developed] : well developed [General Appearance - Well Nourished] : well nourished [Normal Appearance] : normal appearance [Well Groomed] : well groomed [General Appearance - In No Acute Distress] : no acute distress [Abdomen Soft] : soft [Abdomen Tenderness] : non-tender [Abdomen Mass (___ Cm)] : no abdominal mass palpated [Urethral Meatus] : meatus normal [Penis Abnormality] : normal uncircumcised penis [Scrotum] : the scrotum was normal [Epididymis] : the epididymides were normal [Testes Tenderness] : no tenderness of the testes [Testes Mass (___cm)] : there were no testicular masses [Skin Color & Pigmentation] : normal skin color and pigmentation [Heart Rate And Rhythm] : Heart rate and rhythm were normal [Edema] : no peripheral edema [] : no respiratory distress [Respiration, Rhythm And Depth] : normal respiratory rhythm and effort [Exaggerated Use Of Accessory Muscles For Inspiration] : no accessory muscle use [Oriented To Time, Place, And Person] : oriented to person, place, and time [Affect] : the affect was normal [Mood] : the mood was normal [Not Anxious] : not anxious [Normal Station and Gait] : the gait and station were normal for the patient's age [No Focal Deficits] : no focal deficits [No Palpable Adenopathy] : no palpable adenopathy [Costovertebral Angle Tenderness] : no ~M costovertebral angle tenderness [Prostate Tenderness] : the prostate was not tender [No Prostate Nodules] : no prostate nodules [FreeTextEntry1] : Testicular asymmetry (Rt >Lt).  Annular foreskin scar/blanching with phimosis--likely LSA. Nodular prostate.

## 2021-10-18 NOTE — ADDENDUM
[FreeTextEntry1] : A portion of this note was written by [Miller Anna] on 10/13/2021 acting as a scribe for Dr. Pereira. \par \par I have personally reviewed the chart and agree that the record accurately reflects my personal performance of the history, physical exam, assessment, and plan.

## 2021-10-18 NOTE — HISTORY OF PRESENT ILLNESS
[FreeTextEntry1] : Mr. Otero returns for his semi-annual followup, including his interval cystoscopy.  \par \par Mr. 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 He completed two 6-week induction courses of BCG (summer 2018 and March 2020). \par \par Currently Mr. Otero reports moderate-severe stable chronic voiding symptoms (obstructive and irritative) with nocturia x 4. He has mild dysuria without hematuria or fever. His post void residuals have been elevated to <200cc. He is continuing on alfuzosin and finasteride.\par IPSS: 24/35\par Sono: 115cc PVR; 84cc bladder diverticulum; 57cc prostate\par \par Approximately 1 month ago he had a relatively severe lower urinary tract infection which was treated by Dr. Galvez with a short course of Ciprofloxacin.\par \par Mr. Otero also has erectile dysfunction but chooses not to have treatment for this. \par \par PSAs: 6/3/20--3.3; 6/4/19--3.5; 4/11/18--2.2; 12/6/17--3.2; 6/17/16--1.9\par \par TURBTs: 3/15/21--TaG3 (muscularis propria not present); 11/19/19--CIS (diverticular wall bx); 5/15/18--Ta,G3 and CIS; 6/13/17--TaG3; 6/23/15--TaG3; 6/3/13--Neg; 9/21/10--Neg; 9/3/10--T1G3; 7/1/10--T1G3.\par \par CT Urogram: 6/17/20--mild diffuse circumferential mural thickening of the bladder with a large left-sided diverticulum. No discrete bladder mass. Large right inguinal hernia; 4/24/18--5mm anterior enhancing bladder nodule. Left-sided diverticulum. Bilat renal cysts. Large right inguinal hernia. Colonic diverticula.

## 2021-10-18 NOTE — LETTER BODY
[Dear  ___] : Dear  [unfilled], [Please see my note below.] : Please see my note below. [Consult Closing:] : Thank you very much for allowing me to participate in the care of this patient.  If you have any questions, please do not hesitate to contact me. [Sincerely,] : Sincerely, [DrReginald  ___] : Dr. VERMA [FreeTextEntry3] : Phani Pereira MD, FACS\par

## 2021-10-18 NOTE — ASSESSMENT
[FreeTextEntry1] : I discussed the findings and options with Mr. OSCAR SUGGS and his nephew in detail.  The cystoscopy confirms submucosal hemorrhages suggestive of cystitis.  In addition there is a focal area of mucosal erythema on the left dome without any papillary/sessile  bladder tumors.  We agreed to await the urine culture and treat him with 2 weeks of the appropriate antibiotic.  In 3 months we will repeat the cystoscopy and reassess.\par \par Regarding the significant voiding symptoms despite combination therapy, he will need to consider prostatic reductive surgery.  Preferences the "gold standard" TURP and I described this to him with the risks and benefits.\par \par The phimosis and lichen sclerosis have remained stable but they are likely also contributing to the recurrent UTIs.  The only "cure" is a circumcision but we will hold off on this for now.\par \par Finally, the right inguinal hernia has increased in size.  Although this has remained asymptomatic, it may be best to consider elective surgery to address this in the future. \par \par A urine culture, urine cytology and CT abd/pelvis have been ordered. \par \par

## 2021-10-19 ENCOUNTER — NON-APPOINTMENT (OUTPATIENT)
Age: 76
End: 2021-10-19

## 2021-10-19 LAB
PSA SERPL-MCNC: 1.65 NG/ML
URINE CYTOLOGY: NORMAL

## 2021-10-22 ENCOUNTER — NON-APPOINTMENT (OUTPATIENT)
Age: 76
End: 2021-10-22

## 2021-10-22 LAB — BACTERIA UR CULT: ABNORMAL

## 2021-10-22 RX ORDER — SULFAMETHOXAZOLE AND TRIMETHOPRIM 800; 160 MG/1; MG/1
800-160 TABLET ORAL TWICE DAILY
Qty: 14 | Refills: 0 | Status: ACTIVE | COMMUNITY
Start: 2021-10-22 | End: 1900-01-01

## 2021-10-29 ENCOUNTER — NON-APPOINTMENT (OUTPATIENT)
Age: 76
End: 2021-10-29

## 2021-12-14 NOTE — PRE-OP CHECKLIST - LAST TOOK
Taltz Pregnancy And Lactation Text: The risk during pregnancy and breastfeeding is uncertain with this medication. solids

## 2022-01-28 ENCOUNTER — APPOINTMENT (OUTPATIENT)
Dept: UROLOGY | Facility: CLINIC | Age: 77
End: 2022-01-28
Payer: MEDICARE

## 2022-01-28 VITALS — TEMPERATURE: 97.8 F

## 2022-01-28 LAB
BILIRUB UR QL STRIP: NORMAL
CLARITY UR: CLEAR
COLLECTION METHOD: NORMAL
GLUCOSE UR-MCNC: NORMAL
HCG UR QL: 0.2 EU/DL
HGB UR QL STRIP.AUTO: NORMAL
KETONES UR-MCNC: NORMAL
LEUKOCYTE ESTERASE UR QL STRIP: NORMAL
NITRITE UR QL STRIP: NORMAL
PH UR STRIP: 5.5
PROT UR STRIP-MCNC: NORMAL
SP GR UR STRIP: 1.03

## 2022-01-28 PROCEDURE — 99215 OFFICE O/P EST HI 40 MIN: CPT | Mod: 25

## 2022-01-28 PROCEDURE — 51798 US URINE CAPACITY MEASURE: CPT

## 2022-01-28 PROCEDURE — 52281 CYSTOSCOPY AND TREATMENT: CPT

## 2022-01-28 PROCEDURE — 81003 URINALYSIS AUTO W/O SCOPE: CPT | Mod: QW

## 2022-01-28 NOTE — ASSESSMENT
[FreeTextEntry1] : I discussed the findings and options with . OSCAR SUGGS and his nephew in detail.  The cystoscopy confirms submucosal hemorrhages suggestive of cystitis as well as a focal lesion on the dome which requires further evaluation. We will schedule him for a in-hospital biopsy/TURBT. A urine culture should be done 1 week prior and the appropriate antibiotic prescribed preoperatively as he is chronically infected.\par \par Regarding his significant voiding symptoms and partial urinary retention, Mr. Suggs does not want to consider prostatic reductive surgery at this time. We will try increasing the alfuzosin to 2 daily understanding that he should monitor his blood pressure closely. He will also continue on the finasteride.\par \par The right inguinal hernia has remained stable and is reducible. Similarly, he does not want any intervention for this. I have ordered a truss to try and keep this reduced. \par \par \par \par

## 2022-01-28 NOTE — HISTORY OF PRESENT ILLNESS
[FreeTextEntry1] : Mr. Otero returns for his semi-annual followup, including his interval cystoscopy.  \par \par Mr. 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 He completed two 6-week induction courses of BCG (summer 2018 and March 2020). \par \par Currently Mr. Otero reports moderate-severe stable chronic voiding symptoms (obstructive and irritative) with nocturia x 4. He has mild dysuria without hematuria or fever. His post void residuals have been gradually increasing.  He is continuing on alfuzosin and finasteride.\par IPSS: 18/35\par Sono: 230cc PVR\par saeed Continues to have recurrent lower urinary tract infections most recently in September and October 2021.  \par \par Mr. Otero also has erectile dysfunction but chooses not to have treatment for this. \par \par PSAs: 10/18/21--165; 6/3/20--3.3; 6/4/19--3.5; 4/11/18--2.2; 12/6/17--3.2; 6/17/16--1.9\par \par TURBTs: 3/15/21--TaG3 (muscularis propria not present); 11/19/19--CIS (diverticular wall bx); 5/15/18--Ta,G3 and CIS; 6/13/17--TaG3; 6/23/15--TaG3; 6/3/13--Neg; 9/21/10--Neg; 9/3/10--T1G3; 7/1/10--T1G3.\par \par CT Urogram: 6/17/20--mild diffuse circumferential mural thickening of the bladder with a large left-sided diverticulum. No discrete bladder mass. Large right inguinal hernia; 4/24/18--5mm anterior enhancing bladder nodule. Left-sided diverticulum. Bilat renal cysts. Large right inguinal hernia. Colonic diverticula.

## 2022-01-28 NOTE — ADDENDUM
[FreeTextEntry1] : A portion of this note was written by [Miller Anna] on 01/27/2022 acting as a scribe for Dr. Pereira. \par \par I have personally reviewed the chart and agree that the record accurately reflects my personal performance of the history, physical exam, assessment, and plan.

## 2022-01-28 NOTE — PHYSICAL EXAM
[General Appearance - Well Developed] : well developed [General Appearance - Well Nourished] : well nourished [Normal Appearance] : normal appearance [Well Groomed] : well groomed [General Appearance - In No Acute Distress] : no acute distress [Abdomen Soft] : soft [Abdomen Tenderness] : non-tender [Abdomen Mass (___ Cm)] : no abdominal mass palpated [Costovertebral Angle Tenderness] : no ~M costovertebral angle tenderness [Urethral Meatus] : meatus normal [Penis Abnormality] : normal uncircumcised penis [Scrotum] : the scrotum was normal [Epididymis] : the epididymides were normal [Testes Tenderness] : no tenderness of the testes [Testes Mass (___cm)] : there were no testicular masses [Prostate Tenderness] : the prostate was not tender [No Prostate Nodules] : no prostate nodules [Skin Color & Pigmentation] : normal skin color and pigmentation [Heart Rate And Rhythm] : Heart rate and rhythm were normal [Edema] : no peripheral edema [] : no respiratory distress [Respiration, Rhythm And Depth] : normal respiratory rhythm and effort [Exaggerated Use Of Accessory Muscles For Inspiration] : no accessory muscle use [Oriented To Time, Place, And Person] : oriented to person, place, and time [Affect] : the affect was normal [Mood] : the mood was normal [Not Anxious] : not anxious [Normal Station and Gait] : the gait and station were normal for the patient's age [No Focal Deficits] : no focal deficits [No Palpable Adenopathy] : no palpable adenopathy [FreeTextEntry1] : Testicular asymmetry (Rt >Lt).  Annular foreskin scar/blanching with phimosis--likely LSA. Nodular prostate.

## 2022-02-02 ENCOUNTER — NON-APPOINTMENT (OUTPATIENT)
Age: 77
End: 2022-02-02

## 2022-02-02 LAB
BACTERIA UR CULT: ABNORMAL
URINE CYTOLOGY: NORMAL

## 2022-02-15 ENCOUNTER — APPOINTMENT (OUTPATIENT)
Dept: UROLOGY | Facility: HOSPITAL | Age: 77
End: 2022-02-15

## 2022-02-17 LAB — BACTERIA UR CULT: NORMAL

## 2022-02-18 ENCOUNTER — NON-APPOINTMENT (OUTPATIENT)
Age: 77
End: 2022-02-18

## 2022-02-20 LAB — SARS-COV-2 N GENE NPH QL NAA+PROBE: NOT DETECTED

## 2022-02-21 ENCOUNTER — TRANSCRIPTION ENCOUNTER (OUTPATIENT)
Age: 77
End: 2022-02-21

## 2022-02-21 VITALS
HEIGHT: 67 IN | RESPIRATION RATE: 18 BRPM | TEMPERATURE: 97 F | DIASTOLIC BLOOD PRESSURE: 88 MMHG | SYSTOLIC BLOOD PRESSURE: 167 MMHG | HEART RATE: 70 BPM | OXYGEN SATURATION: 96 % | WEIGHT: 182.1 LBS

## 2022-02-21 RX ORDER — LINAGLIPTIN AND METFORMIN HYDROCHLORIDE 2.5; 85 MG/1; MG/1
1 TABLET, FILM COATED ORAL
Qty: 0 | Refills: 0 | DISCHARGE

## 2022-02-21 RX ORDER — OLMESARTAN MEDOXOMIL 5 MG/1
1 TABLET, FILM COATED ORAL
Qty: 0 | Refills: 0 | DISCHARGE

## 2022-02-21 RX ORDER — ROSUVASTATIN CALCIUM 5 MG/1
1 TABLET ORAL
Qty: 0 | Refills: 0 | DISCHARGE

## 2022-02-21 NOTE — ASU PATIENT PROFILE, ADULT - NSICDXPASTSURGICALHX_GEN_ALL_CORE_FT
PAST SURGICAL HISTORY:  Elective surgery left ear cyst removal    Elective surgery Nasal cyst removal    History of surgery left lung lobectomy     PAST SURGICAL HISTORY:  Elective surgery left ear cyst removal    Elective surgery Nasal cyst removal    History of cholecystectomy     History of surgery TURB    History of surgery left lung lobectomy

## 2022-02-21 NOTE — ASU PATIENT PROFILE, ADULT - FALL HARM RISK - UNIVERSAL INTERVENTIONS
Bed in lowest position, wheels locked, appropriate side rails in place/Call bell, personal items and telephone in reach/Instruct patient to call for assistance before getting out of bed or chair/Non-slip footwear when patient is out of bed/[place] to call system/Physically safe environment - no spills, clutter or unnecessary equipment/Purposeful Proactive Rounding/Room/bathroom lighting operational, light cord in reach

## 2022-02-21 NOTE — ASU PATIENT PROFILE, ADULT - NSICDXPASTMEDICALHX_GEN_ALL_CORE_FT
PAST MEDICAL HISTORY:  Bladder cancer     Diabetes mellitus     Diabetes mellitus, type 2     GI bleeding     HLD (hyperlipidemia)     Hypertension     Lung cancer     Malignant neoplasm of bladder     SOB (shortness of breath)      PAST MEDICAL HISTORY:  Bladder cancer     Diabetes mellitus     Diabetes mellitus, type 2     GI bleeding     History of COPD     HLD (hyperlipidemia)     Hypertension     Lung cancer     Malignant neoplasm of bladder     SOB (shortness of breath)

## 2022-02-21 NOTE — PRE-OP CHECKLIST - BSA (M2)
1.94 I have personally evaluated and examined the patient. The Attending was available to me as a supervising provider if needed.

## 2022-02-22 ENCOUNTER — RESULT REVIEW (OUTPATIENT)
Age: 77
End: 2022-02-22

## 2022-02-22 ENCOUNTER — NON-APPOINTMENT (OUTPATIENT)
Age: 77
End: 2022-02-22

## 2022-02-22 ENCOUNTER — INPATIENT (INPATIENT)
Facility: HOSPITAL | Age: 77
LOS: 0 days | Discharge: HOME | DRG: 667 | End: 2022-02-23
Attending: UROLOGY | Admitting: UROLOGY
Payer: COMMERCIAL

## 2022-02-22 ENCOUNTER — APPOINTMENT (OUTPATIENT)
Dept: UROLOGY | Facility: HOSPITAL | Age: 77
End: 2022-02-22
Payer: MEDICARE

## 2022-02-22 DIAGNOSIS — Z41.9 ENCOUNTER FOR PROCEDURE FOR PURPOSES OTHER THAN REMEDYING HEALTH STATE, UNSPECIFIED: Chronic | ICD-10-CM

## 2022-02-22 DIAGNOSIS — Z98.890 OTHER SPECIFIED POSTPROCEDURAL STATES: Chronic | ICD-10-CM

## 2022-02-22 DIAGNOSIS — Z90.49 ACQUIRED ABSENCE OF OTHER SPECIFIED PARTS OF DIGESTIVE TRACT: Chronic | ICD-10-CM

## 2022-02-22 LAB
GLUCOSE BLDC GLUCOMTR-MCNC: 198 MG/DL — HIGH (ref 70–99)
GLUCOSE BLDC GLUCOMTR-MCNC: 255 MG/DL — HIGH (ref 70–99)
GLUCOSE BLDC GLUCOMTR-MCNC: 281 MG/DL — HIGH (ref 70–99)
GLUCOSE BLDC GLUCOMTR-MCNC: 282 MG/DL — HIGH (ref 70–99)

## 2022-02-22 PROCEDURE — 52214 CYSTOSCOPY AND TREATMENT: CPT | Mod: 59

## 2022-02-22 PROCEDURE — 88344 IMHCHEM/IMCYTCHM EA MLT ANTB: CPT | Mod: 26

## 2022-02-22 PROCEDURE — 88305 TISSUE EXAM BY PATHOLOGIST: CPT | Mod: 26

## 2022-02-22 PROCEDURE — 52601 PROSTATECTOMY (TURP): CPT

## 2022-02-22 RX ORDER — INSULIN LISPRO 100/ML
VIAL (ML) SUBCUTANEOUS
Refills: 0 | Status: DISCONTINUED | OUTPATIENT
Start: 2022-02-22 | End: 2022-02-23

## 2022-02-22 RX ORDER — DEXTROSE 50 % IN WATER 50 %
25 SYRINGE (ML) INTRAVENOUS ONCE
Refills: 0 | Status: DISCONTINUED | OUTPATIENT
Start: 2022-02-22 | End: 2022-02-23

## 2022-02-22 RX ORDER — ACETAMINOPHEN 500 MG
650 TABLET ORAL EVERY 6 HOURS
Refills: 0 | Status: DISCONTINUED | OUTPATIENT
Start: 2022-02-22 | End: 2022-02-23

## 2022-02-22 RX ORDER — GLUCAGON INJECTION, SOLUTION 0.5 MG/.1ML
1 INJECTION, SOLUTION SUBCUTANEOUS ONCE
Refills: 0 | Status: DISCONTINUED | OUTPATIENT
Start: 2022-02-22 | End: 2022-02-23

## 2022-02-22 RX ORDER — SODIUM CHLORIDE 9 MG/ML
1000 INJECTION, SOLUTION INTRAVENOUS
Refills: 0 | Status: DISCONTINUED | OUTPATIENT
Start: 2022-02-22 | End: 2022-02-23

## 2022-02-22 RX ORDER — ATORVASTATIN CALCIUM 80 MG/1
40 TABLET, FILM COATED ORAL AT BEDTIME
Refills: 0 | Status: DISCONTINUED | OUTPATIENT
Start: 2022-02-22 | End: 2022-02-23

## 2022-02-22 RX ORDER — INSULIN LISPRO 100/ML
VIAL (ML) SUBCUTANEOUS AT BEDTIME
Refills: 0 | Status: DISCONTINUED | OUTPATIENT
Start: 2022-02-22 | End: 2022-02-23

## 2022-02-22 RX ORDER — DEXTROSE 50 % IN WATER 50 %
15 SYRINGE (ML) INTRAVENOUS ONCE
Refills: 0 | Status: DISCONTINUED | OUTPATIENT
Start: 2022-02-22 | End: 2022-02-23

## 2022-02-22 RX ORDER — DEXTROSE 50 % IN WATER 50 %
12.5 SYRINGE (ML) INTRAVENOUS ONCE
Refills: 0 | Status: DISCONTINUED | OUTPATIENT
Start: 2022-02-22 | End: 2022-02-23

## 2022-02-22 RX ORDER — TAMSULOSIN HYDROCHLORIDE 0.4 MG/1
0.4 CAPSULE ORAL AT BEDTIME
Refills: 0 | Status: DISCONTINUED | OUTPATIENT
Start: 2022-02-22 | End: 2022-02-23

## 2022-02-22 RX ORDER — LOSARTAN POTASSIUM 100 MG/1
25 TABLET, FILM COATED ORAL DAILY
Refills: 0 | Status: DISCONTINUED | OUTPATIENT
Start: 2022-02-22 | End: 2022-02-23

## 2022-02-22 RX ORDER — OXYBUTYNIN CHLORIDE 5 MG
5 TABLET ORAL EVERY 8 HOURS
Refills: 0 | Status: DISCONTINUED | OUTPATIENT
Start: 2022-02-22 | End: 2022-02-23

## 2022-02-22 RX ORDER — FINASTERIDE 5 MG/1
5 TABLET, FILM COATED ORAL DAILY
Refills: 0 | Status: DISCONTINUED | OUTPATIENT
Start: 2022-02-22 | End: 2022-02-23

## 2022-02-22 RX ORDER — LIDOCAINE 4 G/100G
1 CREAM TOPICAL
Refills: 0 | Status: DISCONTINUED | OUTPATIENT
Start: 2022-02-22 | End: 2022-02-23

## 2022-02-22 RX ADMIN — SODIUM CHLORIDE 110 MILLILITER(S): 9 INJECTION, SOLUTION INTRAVENOUS at 18:39

## 2022-02-22 RX ADMIN — Medication 3: at 18:08

## 2022-02-22 RX ADMIN — Medication 5 MILLIGRAM(S): at 21:19

## 2022-02-22 RX ADMIN — Medication 650 MILLIGRAM(S): at 16:15

## 2022-02-22 RX ADMIN — ATORVASTATIN CALCIUM 40 MILLIGRAM(S): 80 TABLET, FILM COATED ORAL at 21:19

## 2022-02-22 RX ADMIN — Medication 650 MILLIGRAM(S): at 15:15

## 2022-02-22 RX ADMIN — Medication 1: at 21:18

## 2022-02-22 RX ADMIN — Medication 5 MILLIGRAM(S): at 12:06

## 2022-02-22 RX ADMIN — TAMSULOSIN HYDROCHLORIDE 0.4 MILLIGRAM(S): 0.4 CAPSULE ORAL at 21:19

## 2022-02-22 RX ADMIN — Medication 1 TABLET(S): at 18:37

## 2022-02-22 RX ADMIN — Medication 3: at 11:59

## 2022-02-22 NOTE — BRIEF OPERATIVE NOTE - NSICDXBRIEFPROCEDURE_GEN_ALL_CORE_FT
PROCEDURES:  Cystoscopy with transurethral resection of prostate (TURP) and transurethral resection of bladder tumor (TURBT) 22-Feb-2022 10:48:44  Jesus Caldera

## 2022-02-22 NOTE — BRIEF OPERATIVE NOTE - NSICDXBRIEFPOSTOP_GEN_ALL_CORE_FT
POST-OP DIAGNOSIS:  Bladder tumor 22-Feb-2022 10:49:15  Jesus Caldera  BPH (benign prostatic hyperplasia) 22-Feb-2022 10:49:28  Jesus Caldera

## 2022-02-22 NOTE — PACU DISCHARGE NOTE - COMMENTS
PACU criteria met. See flowsheet. Report given to Debbie CHRISTY RN. Patient transported to [facility] via stretcherr by PCA.

## 2022-02-22 NOTE — BRIEF OPERATIVE NOTE - NSICDXBRIEFPREOP_GEN_ALL_CORE_FT
PRE-OP DIAGNOSIS:  BPH (benign prostatic hyperplasia) 22-Feb-2022 10:48:54  Jesus Caldera  Bladder tumor 22-Feb-2022 10:49:05  Jesus Caldera

## 2022-02-23 ENCOUNTER — TRANSCRIPTION ENCOUNTER (OUTPATIENT)
Age: 77
End: 2022-02-23

## 2022-02-23 VITALS
DIASTOLIC BLOOD PRESSURE: 67 MMHG | RESPIRATION RATE: 18 BRPM | TEMPERATURE: 97 F | SYSTOLIC BLOOD PRESSURE: 131 MMHG | HEART RATE: 62 BPM | OXYGEN SATURATION: 94 %

## 2022-02-23 LAB
A1C WITH ESTIMATED AVERAGE GLUCOSE RESULT: 8.1 % — HIGH (ref 4–5.6)
ANION GAP SERPL CALC-SCNC: 12 MMOL/L — SIGNIFICANT CHANGE UP (ref 5–17)
BUN SERPL-MCNC: 15 MG/DL — SIGNIFICANT CHANGE UP (ref 7–23)
CALCIUM SERPL-MCNC: 8.8 MG/DL — SIGNIFICANT CHANGE UP (ref 8.4–10.5)
CHLORIDE SERPL-SCNC: 102 MMOL/L — SIGNIFICANT CHANGE UP (ref 96–108)
CO2 SERPL-SCNC: 23 MMOL/L — SIGNIFICANT CHANGE UP (ref 22–31)
CREAT SERPL-MCNC: 0.95 MG/DL — SIGNIFICANT CHANGE UP (ref 0.5–1.3)
ESTIMATED AVERAGE GLUCOSE: 186 MG/DL — HIGH (ref 68–114)
GLUCOSE BLDC GLUCOMTR-MCNC: 199 MG/DL — HIGH (ref 70–99)
GLUCOSE SERPL-MCNC: 191 MG/DL — HIGH (ref 70–99)
HCT VFR BLD CALC: 36.5 % — LOW (ref 39–50)
HGB BLD-MCNC: 12.6 G/DL — LOW (ref 13–17)
MAGNESIUM SERPL-MCNC: 1.6 MG/DL — SIGNIFICANT CHANGE UP (ref 1.6–2.6)
MCHC RBC-ENTMCNC: 32.5 PG — SIGNIFICANT CHANGE UP (ref 27–34)
MCHC RBC-ENTMCNC: 34.5 GM/DL — SIGNIFICANT CHANGE UP (ref 32–36)
MCV RBC AUTO: 94.1 FL — SIGNIFICANT CHANGE UP (ref 80–100)
NRBC # BLD: 0 /100 WBCS — SIGNIFICANT CHANGE UP (ref 0–0)
PHOSPHATE SERPL-MCNC: 3.3 MG/DL — SIGNIFICANT CHANGE UP (ref 2.5–4.5)
PLATELET # BLD AUTO: 200 K/UL — SIGNIFICANT CHANGE UP (ref 150–400)
POTASSIUM SERPL-MCNC: 4.2 MMOL/L — SIGNIFICANT CHANGE UP (ref 3.5–5.3)
POTASSIUM SERPL-SCNC: 4.2 MMOL/L — SIGNIFICANT CHANGE UP (ref 3.5–5.3)
RBC # BLD: 3.88 M/UL — LOW (ref 4.2–5.8)
RBC # FLD: 13 % — SIGNIFICANT CHANGE UP (ref 10.3–14.5)
SODIUM SERPL-SCNC: 137 MMOL/L — SIGNIFICANT CHANGE UP (ref 135–145)
WBC # BLD: 10.48 K/UL — SIGNIFICANT CHANGE UP (ref 3.8–10.5)
WBC # FLD AUTO: 10.48 K/UL — SIGNIFICANT CHANGE UP (ref 3.8–10.5)

## 2022-02-23 PROCEDURE — 88344 IMHCHEM/IMCYTCHM EA MLT ANTB: CPT

## 2022-02-23 PROCEDURE — 85027 COMPLETE CBC AUTOMATED: CPT

## 2022-02-23 PROCEDURE — 83735 ASSAY OF MAGNESIUM: CPT

## 2022-02-23 PROCEDURE — 86850 RBC ANTIBODY SCREEN: CPT

## 2022-02-23 PROCEDURE — 52224 CYSTOSCOPY AND TREATMENT: CPT

## 2022-02-23 PROCEDURE — 52601 PROSTATECTOMY (TURP): CPT

## 2022-02-23 PROCEDURE — 80048 BASIC METABOLIC PNL TOTAL CA: CPT

## 2022-02-23 PROCEDURE — 86901 BLOOD TYPING SEROLOGIC RH(D): CPT

## 2022-02-23 PROCEDURE — 88305 TISSUE EXAM BY PATHOLOGIST: CPT

## 2022-02-23 PROCEDURE — 86900 BLOOD TYPING SEROLOGIC ABO: CPT

## 2022-02-23 PROCEDURE — 84100 ASSAY OF PHOSPHORUS: CPT

## 2022-02-23 PROCEDURE — 82962 GLUCOSE BLOOD TEST: CPT

## 2022-02-23 PROCEDURE — 36415 COLL VENOUS BLD VENIPUNCTURE: CPT

## 2022-02-23 PROCEDURE — 83036 HEMOGLOBIN GLYCOSYLATED A1C: CPT

## 2022-02-23 RX ADMIN — LOSARTAN POTASSIUM 25 MILLIGRAM(S): 100 TABLET, FILM COATED ORAL at 05:15

## 2022-02-23 RX ADMIN — Medication 1: at 08:16

## 2022-02-23 RX ADMIN — Medication 5 MILLIGRAM(S): at 05:15

## 2022-02-23 RX ADMIN — SODIUM CHLORIDE 110 MILLILITER(S): 9 INJECTION, SOLUTION INTRAVENOUS at 05:16

## 2022-02-23 RX ADMIN — Medication 1 TABLET(S): at 05:15

## 2022-02-23 NOTE — DISCHARGE NOTE PROVIDER - NSDCMRMEDTOKEN_GEN_ALL_CORE_FT
alfuzosin 10 mg oral tablet, extended release: 1 tab(s) orally once a day  amoxicillin-clavulanate 875 mg-125 mg oral tablet: 1 tab(s) orally 2 times a day MDD:2 tablets  Benicar 20 mg oral tablet: 1 tab(s) orally once a day  Glucotrol XL 5 mg oral tablet, extended release: 1 tab(s) orally once a day  Jentadueto XR 5 mg-1000 mg oral tablet, extended release: 1 tab(s) orally once a day  rosuvastatin 10 mg oral tablet: 1 tab(s) orally once a day  Symbicort 80 mcg-4.5 mcg/inh inhalation aerosol: inhaled prn, As Needed

## 2022-02-23 NOTE — DISCHARGE NOTE PROVIDER - CARE PROVIDER_API CALL
Phani Pereira)  Urology  [address] [place] [address]  Phone: [phone]  Fax: [phone]  Scheduled Appointment: 02/25/2022

## 2022-02-23 NOTE — PROGRESS NOTE ADULT - SUBJECTIVE AND OBJECTIVE BOX
AM Note    No acute events overnight.      Vital Signs Last 24 Hrs  T(C): 36.3 (02-23-22 @ 05:04), Max: 36.7 (02-22-22 @ 12:30)  T(F): 97.4 (02-23-22 @ 05:04), Max: 98.1 (02-22-22 @ 12:30)  HR: 83 (02-23-22 @ 05:04) (63 - 93)  BP: 152/74 (02-23-22 @ 05:04) (106/65 - 175/76)  BP(mean): 96 (02-22-22 @ 12:30) (81 - 118)  RR: 17 (02-23-22 @ 05:04) (15 - 33)  SpO2: 95% (02-23-22 @ 05:04) (95% - 98%)                 I&O's Summary    22 Feb 2022 07:01  -  23 Feb 2022 06:06  --------------------------------------------------------  IN: 74127 mL / OUT: 60069 mL / NET: -1080 mL          PHYSICAL EXAM:    GEN: awake and alert  ABD: nontender, nondistended  : no suprapubic/CVAT. FC intact CBI ON
: Admit/op note    Mr. Otero was admitted for a TURB/bladder biopsies and a TURP. The risks (including bleeding, infection, incontinence, worsening ED, urinary tract/adjacent organ injuries, stricture/bladder neck contracture), benefits, and alternatives were discussed. He is being admitted postoperatively due to the extent of the procedure and his comorbidities. 
   POST OP NOTE:    Patient states has some discomfort from potter,  denies any nausea or vomiting, denies fever or chills, denies SOB or CP.       02-22-22 @ 07:01  -  02-22-22 @ 19:07  --------------------------------------------------------  IN: 6240 mL / OUT: 6050 mL / NET: 190 mL      T(C): 36.7 (02-22-22 @ 16:48), Max: 36.7 (02-22-22 @ 12:30)  HR: 63 (02-22-22 @ 16:48) (63 - 93)  BP: 134/69 (02-22-22 @ 16:48) (106/65 - 175/76)  RR: 18 (02-22-22 @ 16:48) (15 - 33)  SpO2: 95% (02-22-22 @ 16:48) (95% - 98%)    GEN: NAD  ABD: Soft, ND, NT  : potter draining clear on CBI        A/P 77 yo m with bph and bladder tumor s/p turbt and turp  1) advance diet  2) cont IVF until tolerating PO  3)OOB  4) ISS

## 2022-02-23 NOTE — PROGRESS NOTE ADULT - ASSESSMENT
A/P 77 yo m with bph and bladder tumor s/p turbt and turp  1) advance diet  2) cont IVF until tolerating PO  3)OOB  4) ISS

## 2022-02-23 NOTE — DISCHARGE NOTE PROVIDER - HOSPITAL COURSE
75yo male with PMH of bladder tumor, diabetes, HTN, BPH, HLD s/p TURBT/TURP. Patient tolerated procedure well and no post operative complications were noted. Patient's VSS and he is hemodynamically stable. Patient is medically optimized for discharge.

## 2022-02-23 NOTE — DISCHARGE NOTE PROVIDER - NSDCCPCAREPLAN_GEN_ALL_CORE_FT
PRINCIPAL DISCHARGE DIAGNOSIS  Diagnosis: Bladder tumor  Assessment and Plan of Treatment:       SECONDARY DISCHARGE DIAGNOSES  Diagnosis: Diabetes mellitus  Assessment and Plan of Treatment:     Diagnosis: Hypertension  Assessment and Plan of Treatment:     Diagnosis: Hyperlipidemia  Assessment and Plan of Treatment:

## 2022-02-23 NOTE — DISCHARGE NOTE NURSING/CASE MANAGEMENT/SOCIAL WORK - PATIENT PORTAL LINK FT
You can access the FollowMyHealth Patient Portal offered by [facility] by registering at the following website: [email or web address]. By joining [employer]’s FollowMyHealth portal, you will also be able to view your health information using other applications (apps) compatible with our system.

## 2022-02-23 NOTE — DISCHARGE NOTE PROVIDER - NSDCFUADDINST_GEN_ALL_CORE_FT
[place]pson Catheter Instructions:  - Please care for and empty urinary catheter bag as instructed by nurse.    General Discharge Instructions:  Use Tylenol for pain control as needed  Please resume all regular home medications unless specifically advised not to take a particular medication. Also, please take any new medications as prescribed.  Please get plenty of rest, continue to ambulate several times per day, and drink adequate amounts of fluids.     Warning Signs:  Please call your doctor if you experience the following:  *You experience new chest pain, pressure, squeezing or tightness.  *New or worsening cough, shortness of breath, or wheeze.  *If you are vomiting and cannot keep down fluids or your medications.  *You are getting dehydrated due to continued vomiting, diarrhea, or other reasons. Signs of dehydration include dry mouth, rapid heartbeat, or feeling dizzy or faint when standing.  *You see blood or dark/black material when you vomit or have a bowel movement.  *You experience burning when you urinate, have blood in your urine, or experience a discharge.  *Your pain is not improving within 8-12 hours or is not gone within 24 hours. Call or return immediately if your pain is getting worse, changes location, or moves to your chest or back.  *You have shaking chills, or fever greater than 100.4 degrees Fahrenheit.  *Any change in your symptoms, or any new symptoms that concern you.      -Use extra strength Tylenol (500-650mg) PRN for pain q6hrs  -Use Colace as stool softener as prescribed

## 2022-02-23 NOTE — DISCHARGE NOTE NURSING/CASE MANAGEMENT/SOCIAL WORK - NSDCPEFALRISK_GEN_ALL_CORE
For information on Fall & Injury Prevention, visit: [email or web address] OR  [email or web address] OR  https://www.cdc.gov/steadi/patient.html

## 2022-02-23 NOTE — DISCHARGE NOTE PROVIDER - NSDCCPTREATMENT_GEN_ALL_CORE_FT
PRINCIPAL PROCEDURE  Procedure: Cystoscopy with transurethral resection of prostate (TURP) and transurethral resection of bladder tumor (TURBT)  Findings and Treatment:

## 2022-02-25 ENCOUNTER — APPOINTMENT (OUTPATIENT)
Dept: UROLOGY | Facility: CLINIC | Age: 77
End: 2022-02-25
Payer: MEDICARE

## 2022-02-25 PROBLEM — C67.9 MALIGNANT NEOPLASM OF BLADDER, UNSPECIFIED: Chronic | Status: ACTIVE | Noted: 2022-02-21

## 2022-02-25 PROBLEM — Z87.09 PERSONAL HISTORY OF OTHER DISEASES OF THE RESPIRATORY SYSTEM: Chronic | Status: ACTIVE | Noted: 2022-02-21

## 2022-02-25 PROCEDURE — 99213 OFFICE O/P EST LOW 20 MIN: CPT

## 2022-02-25 PROCEDURE — 51798 US URINE CAPACITY MEASURE: CPT

## 2022-02-25 NOTE — PHYSICAL EXAM
[General Appearance - Well Developed] : well developed [General Appearance - Well Nourished] : well nourished [Normal Appearance] : normal appearance [Well Groomed] : well groomed [General Appearance - In No Acute Distress] : no acute distress [Abdomen Soft] : soft [Abdomen Tenderness] : non-tender [Abdomen Mass (___ Cm)] : no abdominal mass palpated [Costovertebral Angle Tenderness] : no ~M costovertebral angle tenderness [Urethral Meatus] : meatus normal [Penis Abnormality] : normal uncircumcised penis [Scrotum] : the scrotum was normal [Epididymis] : the epididymides were normal [Testes Mass (___cm)] : there were no testicular masses [Testes Tenderness] : no tenderness of the testes [Skin Color & Pigmentation] : normal skin color and pigmentation [Heart Rate And Rhythm] : Heart rate and rhythm were normal [Edema] : no peripheral edema [] : no respiratory distress [Respiration, Rhythm And Depth] : normal respiratory rhythm and effort [Exaggerated Use Of Accessory Muscles For Inspiration] : no accessory muscle use [Oriented To Time, Place, And Person] : oriented to person, place, and time [Mood] : the mood was normal [Affect] : the affect was normal [Not Anxious] : not anxious [Normal Station and Gait] : the gait and station were normal for the patient's age [No Focal Deficits] : no focal deficits [No Palpable Adenopathy] : no palpable adenopathy [FreeTextEntry1] : Testicular asymmetry (Rt >Lt).  Annular foreskin scar/blanching with phimosis--likely LSA. Nodular prostate.

## 2022-02-25 NOTE — ASSESSMENT
[FreeTextEntry1] : I discussed the findings and options with . OSCAR SUGGS and his nephew in detail.  He has voided on several occasions but with a weak stream.  \par We agreed that he would continue with both prostate medications until his voiding improves. We will be communicating by phone regarding his progress.\par \par \par \par \par

## 2022-02-25 NOTE — HISTORY OF PRESENT ILLNESS
[FreeTextEntry1] : Mr. Otero returns for follow-up, including a scheduled voiding trial. He underwent a cystoscopy, TURBT, and elective TURP on February 22, 2022. \par \par Mr. 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 He completed two 6-week induction courses of BCG (summer 2018 and March 2020). \par \par Currently Mr. Otero reports moderate-severe stable chronic voiding symptoms (obstructive and irritative) with nocturia x 4. He has mild dysuria without hematuria or fever. His post void residuals have been gradually increasing.  He is continuing on alfuzosin and finasteride.\par Sono: 280cc\[facility] \william Continues to have recurrent lower urinary tract infections most recently in September and October 2021.  \par \par Mr. Otero also has erectile dysfunction but chooses not to have treatment for this. \par \par PSAs: 10/18/21--165; 6/3/20--3.3; 6/4/19--3.5; 4/11/18--2.2; 12/6/17--3.2; 6/17/16--1.9\par \par TURBTs: 3/15/21--TaG3 (muscularis propria not present); 11/19/19--CIS (diverticular wall bx); 5/15/18--Ta,G3 and CIS; 6/13/17--TaG3; 6/23/15--TaG3; 6/3/13--Neg; 9/21/10--Neg; 9/3/10--T1G3; 7/1/10--T1G3.\par \par CT Urogram: 6/17/20--mild diffuse circumferential mural thickening of the bladder with a large left-sided diverticulum. No discrete bladder mass. Large right inguinal hernia; 4/24/18--5mm anterior enhancing bladder nodule. Left-sided diverticulum. Bilat renal cysts. Large right inguinal hernia. Colonic diverticula.

## 2022-02-28 LAB — SURGICAL PATHOLOGY STUDY: SIGNIFICANT CHANGE UP

## 2022-03-01 ENCOUNTER — NON-APPOINTMENT (OUTPATIENT)
Age: 77
End: 2022-03-01

## 2022-03-01 NOTE — REVIEW OF SYSTEMS
No [Shortness Of Breath] : shortness of breath [see HPI] : see HPI [Urinary Retention] : urinary retention [Urinary Stream Starts/Stops] : urinary stream starts and stops [Initiating Urination Req. Strain] : initiating urination requires straining [Negative] : Heme/Lymph

## 2022-03-02 ENCOUNTER — APPOINTMENT (OUTPATIENT)
Dept: UROLOGY | Facility: CLINIC | Age: 77
End: 2022-03-02
Payer: MEDICARE

## 2022-03-02 VITALS
RESPIRATION RATE: 14 BRPM | TEMPERATURE: 97.8 F | HEART RATE: 89 BPM | DIASTOLIC BLOOD PRESSURE: 72 MMHG | SYSTOLIC BLOOD PRESSURE: 127 MMHG | OXYGEN SATURATION: 97 %

## 2022-03-02 PROCEDURE — 99215 OFFICE O/P EST HI 40 MIN: CPT

## 2022-03-02 NOTE — ASSESSMENT
[FreeTextEntry1] : I discussed the findings and options with Mr. OSCAR SUGGS and his wife and nephew in great detail and provided them with the pertinent records. \par \par The finding of recurrent, high grade, multifocal urothelial carcinoma is concerning. As a next step, I would like Mr. Suggs to see Dr. Dana Whipple at [facility] for an oncologic consultation as he will likely require a different intravesical chemotherapy regimen and possibly systemic immunotherapy.  He refuses to consider radical surgery. \par \par No intervention is indicated for the incidentally detected, low grade prostate cancer. He will pursue "watchful waiting" for this. \par \par Mr. Suggs will continue on the combination therapy for his urinary symptoms for one additional week. \par \par Providing there are no new problems, I look forward to seeing him in one month. I will communicate with Dr. Whipple regarding his care as well. \par \par \par \par \par

## 2022-03-02 NOTE — HISTORY OF PRESENT ILLNESS
[FreeTextEntry1] : Mr. OSCAR SUGGS comes in today for his urologic follow-up and discussion.  He underwent a cystoscopy, TURBT, and elective TURP on February 22, 2022. The pathology identified recurrent Ta, G3 urothelial carcinoma as well as carcinoma in situ. The TURP specimen showed a single focus of Grade Group 1 prostatic adenocarcinoma (see below). \par \par Mr. 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 He completed two 6-week induction courses of BCG (summer 2018 and March 2020). \par \par Mr. Suggs continues to have moderate obstructive voiding symptoms despite his prostatic surgery and awakens 4 times a night to urinate.  He also has been experiencing intermittent mild dysuria without fever.  His most recent urine culture was negative.   He is continuing on alfuzosin and finasteride until the aforementioned symptoms tayler. \par \par Mr. Suggs also has erectile dysfunction but chooses not to have treatment for this. \par \par PSAs: 10/18/21--1.65; 6/3/20--3.3; 6/4/19--3.5; 4/11/18--2.2; 12/6/17--3.2; 6/17/16--1.9\[facility] \[facility] Pathology (TURBTs): 2/22/22--TaG3, CIS in bladder specimen. [place] 3+3 prostatic adenocarcinoma in 5% of one chip from the TURP specimen; 3/15/21--TaG3 (muscularis propria not present); 11/19/19--CIS (diverticular wall bx); 5/15/18--Ta,G3 and CIS; 6/13/17--TaG3; 6/23/15--TaG3; 6/3/13--Neg; 9/21/10--Neg; 9/3/10--T1G3; 7/1/10--T1G3.\par \par CT Urogram: 6/17/20--Mild diffuse circumferential mural thickening of the bladder with a large left-sided diverticulum. No discrete bladder mass. Large right inguinal hernia; 4/24/18--5mm anterior enhancing bladder nodule. Left-sided diverticulum. Bilat renal cysts. Large right inguinal hernia. Colonic diverticula.

## 2022-03-02 NOTE — ADDENDUM
[FreeTextEntry1] : A portion of this note was written by [Miller Anna] on 03/01/2022 acting as a scribe for Dr. Pereira. \par \par I have personally reviewed the chart and agree that the record accurately reflects my personal performance of the history, physical exam, assessment, and plan.

## 2022-03-02 NOTE — LETTER BODY
[Dear  ___] : Dear  [unfilled], [Please see my note below.] : Please see my note below. [Consult Closing:] : Thank you very much for allowing me to participate in the care of this patient.  If you have any questions, please do not hesitate to contact me. [Sincerely,] : Sincerely, [DrReginald  ___] : Dr. VERMA [DrReginald ___] : Dr. VERMA [FreeTextEntry3] : Phani Pereira MD, FACS\par

## 2022-03-02 NOTE — PHYSICAL EXAM
[General Appearance - Well Developed] : well developed [Normal Appearance] : normal appearance [General Appearance - Well Nourished] : well nourished [Well Groomed] : well groomed [General Appearance - In No Acute Distress] : no acute distress [] : no respiratory distress [Respiration, Rhythm And Depth] : normal respiratory rhythm and effort [Exaggerated Use Of Accessory Muscles For Inspiration] : no accessory muscle use [Oriented To Time, Place, And Person] : oriented to person, place, and time [Affect] : the affect was normal [Mood] : the mood was normal [Not Anxious] : not anxious [FreeTextEntry1] : Testicular asymmetry (Rt >Lt).  Annular foreskin scar/blanching with phimosis--likely LSA. Nodular prostate.

## 2022-03-03 DIAGNOSIS — D30.3 BENIGN NEOPLASM OF BLADDER: ICD-10-CM

## 2022-03-03 DIAGNOSIS — N40.0 BENIGN PROSTATIC HYPERPLASIA WITHOUT LOWER URINARY TRACT SYMPTOMS: ICD-10-CM

## 2022-03-03 DIAGNOSIS — J44.9 CHRONIC OBSTRUCTIVE PULMONARY DISEASE, UNSPECIFIED: ICD-10-CM

## 2022-03-03 DIAGNOSIS — Z85.51 PERSONAL HISTORY OF MALIGNANT NEOPLASM OF BLADDER: ICD-10-CM

## 2022-03-03 DIAGNOSIS — E11.9 TYPE 2 DIABETES MELLITUS WITHOUT COMPLICATIONS: ICD-10-CM

## 2022-03-03 DIAGNOSIS — I10 ESSENTIAL (PRIMARY) HYPERTENSION: ICD-10-CM

## 2022-03-03 DIAGNOSIS — E78.5 HYPERLIPIDEMIA, UNSPECIFIED: ICD-10-CM

## 2022-03-11 ENCOUNTER — LABORATORY RESULT (OUTPATIENT)
Age: 77
End: 2022-03-11

## 2022-04-01 ENCOUNTER — APPOINTMENT (OUTPATIENT)
Dept: UROLOGY | Facility: CLINIC | Age: 77
End: 2022-04-01
Payer: MEDICARE

## 2022-04-01 LAB
BILIRUB UR QL STRIP: NEGATIVE
CLARITY UR: NORMAL
COLLECTION METHOD: NORMAL
GLUCOSE UR-MCNC: NORMAL
HCG UR QL: 0.2 EU/DL
HGB UR QL STRIP.AUTO: NORMAL
KETONES UR-MCNC: NEGATIVE
LEUKOCYTE ESTERASE UR QL STRIP: NORMAL
NITRITE UR QL STRIP: NEGATIVE
PH UR STRIP: 5
PROT UR STRIP-MCNC: NORMAL
SP GR UR STRIP: 1.02

## 2022-04-01 PROCEDURE — 81003 URINALYSIS AUTO W/O SCOPE: CPT | Mod: QW

## 2022-04-01 PROCEDURE — 51720 TREATMENT OF BLADDER LESION: CPT

## 2022-04-01 RX ORDER — SULFAMETHOXAZOLE AND TRIMETHOPRIM 800; 160 MG/1; MG/1
800-160 TABLET ORAL TWICE DAILY
Qty: 14 | Refills: 0 | Status: DISCONTINUED | COMMUNITY
Start: 2022-03-16 | End: 2022-04-01

## 2022-04-01 RX ORDER — FINASTERIDE 5 MG/1
5 TABLET, FILM COATED ORAL DAILY
Qty: 90 | Refills: 3 | Status: DISCONTINUED | COMMUNITY
End: 2022-04-01

## 2022-04-01 NOTE — ADDENDUM
[FreeTextEntry1] : A portion of this note was written by [Miller Anna] on 03/29/2022 acting as a scribe for Dr. Pereira. \par \par I have personally reviewed the chart and agree that the record accurately reflects my personal performance of the history, physical exam, assessment, and plan.

## 2022-04-01 NOTE — PHYSICAL EXAM
[General Appearance - Well Developed] : well developed [General Appearance - Well Nourished] : well nourished [Normal Appearance] : normal appearance [Well Groomed] : well groomed [General Appearance - In No Acute Distress] : no acute distress [Abdomen Soft] : soft [Abdomen Tenderness] : non-tender [Costovertebral Angle Tenderness] : no ~M costovertebral angle tenderness [Urethral Meatus] : meatus normal [Urinary Bladder Findings] : the bladder was normal on palpation [Scrotum] : the scrotum was normal [Testes Mass (___cm)] : there were no testicular masses [Edema] : no peripheral edema [] : no respiratory distress [Respiration, Rhythm And Depth] : normal respiratory rhythm and effort [Exaggerated Use Of Accessory Muscles For Inspiration] : no accessory muscle use [Oriented To Time, Place, And Person] : oriented to person, place, and time [Affect] : the affect was normal [Mood] : the mood was normal [Not Anxious] : not anxious [Normal Station and Gait] : the gait and station were normal for the patient's age [No Focal Deficits] : no focal deficits [No Palpable Adenopathy] : no palpable adenopathy [FreeTextEntry1] : Testicular asymmetry (Rt >Lt).  Annular foreskin scar/blanching with phimosis--likely LSA. Nodular prostate.

## 2022-04-01 NOTE — HISTORY OF PRESENT ILLNESS
[FreeTextEntry1] : Mr. OSCAR SUGGS comes in today for his urologic follow-up and first scheduled intravesical instillation of gemcitabine. Mr. 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 He underwent a cystoscopy, TURBT, and elective TURP on February 22, 2022. The pathology identified recurrent Ta, G3 urothelial carcinoma as well as carcinoma in situ. The TURP specimen showed a single focus of Grade Group 1 prostatic adenocarcinoma (see below). \par \par Mr. 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 He completed two 6-week induction courses of BCG (summer 2018 and March 2020). \par \par Post-TURP, he continues to have moderate obstructive and irritative voiding symptoms with nocturia x2.   He was recently treated for a UTI and completed a course of Bactrim DS.  He is still on afluzosin and finasteride. \par \par Mr. Suggs also has erectile dysfunction but chooses not to have treatment for this. \par \par PSAs: 10/18/21--1.65; 6/3/20--3.3; 6/4/19--3.5; 4/11/18--2.2; 12/6/17--3.2; 6/17/16--1.9\par \par Pathology (TURBTs): 2/22/22--TaG3, CIS in bladder specimen. [place] 3+3 prostatic adenocarcinoma in 5% of one chip from the TURP specimen; 3/15/21--TaG3 (muscularis propria not present); 11/19/19--CIS (diverticular wall bx); 5/15/18--Ta,G3 and CIS; 6/13/17--TaG3; 6/23/15--TaG3; 6/3/13--Neg; 9/21/10--Neg; 9/3/10--T1G3; 7/1/10--T1G3.\par \par CT Urogram: 6/17/20--Mild diffuse circumferential mural thickening of the bladder with a large left-sided diverticulum. No discrete bladder mass. Large right inguinal hernia; 4/24/18--5mm anterior enhancing bladder nodule. Left-sided diverticulum. Bilat renal cysts. Large right inguinal hernia. Colonic diverticula.

## 2022-04-01 NOTE — ASSESSMENT
[FreeTextEntry1] : I discussed the findings and options with Mr. OSCAR SUGGS and his nephew in detail.  They continue not to want radical surgery for the bladder cancer but are considering systemic immunotherapy with pembrolizumab.  They are scheduled to see Dr. Whipple again for another consultation regarding the non-surgical options.\par \par Mr. Suggs begain the first of 6 intravesical gentamicin treatments today (2g in 100 mL of normal saline).  I reviewed the potential side effects with him.\par \par No intervention is indicated for the incidentally detected, low grade prostate cancer. He will pursue "watchful waiting" for this. \par \par I have referred Mr. Suggs to Dr. Whalen for the right inguinal hernia which has increased in size and is somewhat symptomatic.\par \par \par \par \par

## 2022-04-04 RX ADMIN — Medication 1 GM: at 00:00

## 2022-04-05 LAB — BACTERIA UR CULT: ABNORMAL

## 2022-04-05 RX ORDER — NITROFURANTOIN (MONOHYDRATE/MACROCRYSTALS) 25; 75 MG/1; MG/1
100 CAPSULE ORAL
Qty: 10 | Refills: 0 | Status: ACTIVE | COMMUNITY
Start: 2022-04-05 | End: 1900-01-01

## 2022-04-06 ENCOUNTER — NON-APPOINTMENT (OUTPATIENT)
Age: 77
End: 2022-04-06

## 2022-04-08 ENCOUNTER — APPOINTMENT (OUTPATIENT)
Dept: UROLOGY | Facility: CLINIC | Age: 77
End: 2022-04-08
Payer: MEDICARE

## 2022-04-08 VITALS
RESPIRATION RATE: 18 BRPM | HEART RATE: 75 BPM | TEMPERATURE: 98 F | SYSTOLIC BLOOD PRESSURE: 125 MMHG | DIASTOLIC BLOOD PRESSURE: 80 MMHG

## 2022-04-08 LAB
BILIRUB UR QL STRIP: NEGATIVE
CLARITY UR: NORMAL
COLLECTION METHOD: NORMAL
GLUCOSE UR-MCNC: NORMAL
HCG UR QL: 0.2 EU/DL
HGB UR QL STRIP.AUTO: NORMAL
KETONES UR-MCNC: NORMAL
LEUKOCYTE ESTERASE UR QL STRIP: NORMAL
NITRITE UR QL STRIP: NEGATIVE
PH UR STRIP: 6
PROT UR STRIP-MCNC: NORMAL
SP GR UR STRIP: 1.02

## 2022-04-08 PROCEDURE — 81003 URINALYSIS AUTO W/O SCOPE: CPT | Mod: QW

## 2022-04-08 PROCEDURE — 51720 TREATMENT OF BLADDER LESION: CPT

## 2022-04-08 RX ORDER — GEMCITABINE 2 G/50ML
2 INJECTION, POWDER, LYOPHILIZED, FOR SOLUTION INTRAVENOUS
Qty: 6 | Refills: 0 | Status: COMPLETED | COMMUNITY
Start: 2022-03-21 | End: 2022-04-08

## 2022-04-08 RX ORDER — SILVER SULFADIAZINE 10 MG/G
1 CREAM TOPICAL TWICE DAILY
Qty: 1 | Refills: 0 | Status: ACTIVE | COMMUNITY
Start: 2022-04-08 | End: 1900-01-01

## 2022-04-08 RX ORDER — GEMCITABINE HYDROCHLORIDE 1 G/1
1 INJECTION, POWDER, LYOPHILIZED, FOR SOLUTION INTRAVENOUS
Qty: 2 | Refills: 0 | Status: COMPLETED | OUTPATIENT
Start: 2022-04-08

## 2022-04-08 RX ADMIN — GEMCITABINE HYDROCHLORIDE 0 GM: 1 INJECTION, POWDER, LYOPHILIZED, FOR SOLUTION INTRAVENOUS at 00:00

## 2022-04-08 NOTE — ADDENDUM
[FreeTextEntry1] : A portion of this note was written by [Miller Anna] on 04/06/2022 acting as a scribe for Dr. Pereira. \par \par I have personally reviewed the chart and agree that the record accurately reflects my personal performance of the history, physical exam, assessment, and plan.

## 2022-04-08 NOTE — PHYSICAL EXAM
[General Appearance - Well Developed] : well developed [General Appearance - Well Nourished] : well nourished [Normal Appearance] : normal appearance [Well Groomed] : well groomed [General Appearance - In No Acute Distress] : no acute distress [Abdomen Soft] : soft [Abdomen Tenderness] : non-tender [Costovertebral Angle Tenderness] : no ~M costovertebral angle tenderness [Urethral Meatus] : meatus normal [Urinary Bladder Findings] : the bladder was normal on palpation [Scrotum] : the scrotum was normal [Testes Mass (___cm)] : there were no testicular masses [Edema] : no peripheral edema [] : no respiratory distress [Respiration, Rhythm And Depth] : normal respiratory rhythm and effort [Exaggerated Use Of Accessory Muscles For Inspiration] : no accessory muscle use [Oriented To Time, Place, And Person] : oriented to person, place, and time [Affect] : the affect was normal [Mood] : the mood was normal [Not Anxious] : not anxious [Normal Station and Gait] : the gait and station were normal for the patient's age [No Focal Deficits] : no focal deficits [No Palpable Adenopathy] : no palpable adenopathy [FreeTextEntry1] : Testicular asymmetry (Rt >Lt).  Annular foreskin scar/blanching with phimosis--likely LSA.  There is a new fissure on the dorsal aspect of the foreskin.

## 2022-04-08 NOTE — HISTORY OF PRESENT ILLNESS
[FreeTextEntry1] : Mr. OSCAR SUGGS comes in today for followup and the 2nd ntravesical instillation of gemcitabine. Mr. 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 He underwent a cystoscopy, TURBT, and elective TURP on February 22, 2022. The pathology identified recurrent Ta, G3 urothelial carcinoma as well as carcinoma in situ. The TURP specimen showed a single focus of Grade Group 1 prostatic adenocarcinoma (see below). \par \par He has not had any problems with the first gemcitabine instillation. \par \par Mr. 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 He completed two 6-week induction courses of BCG (summer 2018 and March 2020). \par \par Post-TURP, he continues to have moderate obstructive and irritative voiding symptoms with nocturia x2.   He completed a 5-day course of nitrofurantoin for an uncomplicated lower urinary tract infection (Enterococcus).\par IPSS: 17/35\par \par Mr. Suggs also has erectile dysfunction but chooses not to have treatment for this. \par \par PSAs: 10/18/21--1.65; 6/3/20--3.3; 6/4/19--3.5; 4/11/18--2.2; 12/6/17--3.2; 6/17/16--1.9\par \par Pathology (TURBTs): 2/22/22--TaG3, CIS in bladder specimen. [place] 3+3 prostatic adenocarcinoma in 5% of one chip from the TURP specimen; 3/15/21--TaG3 (muscularis propria not present); 11/19/19--CIS (diverticular wall bx); 5/15/18--Ta,G3 and CIS; 6/13/17--TaG3; 6/23/15--TaG3; 6/3/13--Neg; 9/21/10--Neg; 9/3/10--T1G3; 7/1/10--T1G3.\par \par CT Urogram: 6/17/20--Mild diffuse circumferential mural thickening of the bladder with a large left-sided diverticulum. No discrete bladder mass. Large right inguinal hernia; 4/24/18--5mm anterior enhancing bladder nodule. Left-sided diverticulum. Bilat renal cysts. Large right inguinal hernia. Colonic diverticula.

## 2022-04-08 NOTE — ASSESSMENT
[FreeTextEntry1] : I discussed the findings and options with Mr. OSCAR SUGGS and his nephew in detail.  He did not want to be in the pembrolizumab study so he will simply continue with the  6-week course of intravesical gemcitabine.\par \par Mr. Suggs has arranged to have the right inguinal hernia repaired by Dr. Whalen upon completion of the bladder treatments, next month.\par \par Regarding the phimosis and penile fissure, I am hoping to avoid a circumcision.  I advised that he use Silvadene cream on the foreskin to help prevent a local infection and enhance healing.\par \par Mr. Suggs will return next week for his third gemcitabine bladder instillation.\par \par \par \par

## 2022-04-11 LAB
APPEARANCE: ABNORMAL
BACTERIA UR CULT: NORMAL
BACTERIA: NEGATIVE
BILIRUBIN URINE: NEGATIVE
BLOOD URINE: ABNORMAL
COLOR: YELLOW
GLUCOSE QUALITATIVE U: ABNORMAL
HYALINE CASTS: 0 /LPF
KETONES URINE: NORMAL
LEUKOCYTE ESTERASE URINE: ABNORMAL
MICROSCOPIC-UA: NORMAL
NITRITE URINE: NEGATIVE
PH URINE: 6.5
PROTEIN URINE: ABNORMAL
RED BLOOD CELLS URINE: 4 /HPF
SPECIFIC GRAVITY URINE: 1.02
SQUAMOUS EPITHELIAL CELLS: 9 /HPF
UROBILINOGEN URINE: NORMAL
WHITE BLOOD CELLS URINE: 676 /HPF

## 2022-04-15 ENCOUNTER — APPOINTMENT (OUTPATIENT)
Dept: UROLOGY | Facility: CLINIC | Age: 77
End: 2022-04-15

## 2022-04-22 ENCOUNTER — APPOINTMENT (OUTPATIENT)
Dept: UROLOGY | Facility: CLINIC | Age: 77
End: 2022-04-22

## 2022-04-29 ENCOUNTER — APPOINTMENT (OUTPATIENT)
Dept: UROLOGY | Facility: CLINIC | Age: 77
End: 2022-04-29

## 2022-05-06 ENCOUNTER — APPOINTMENT (OUTPATIENT)
Dept: UROLOGY | Facility: CLINIC | Age: 77
End: 2022-05-06

## 2022-05-10 NOTE — HISTORY OF PRESENT ILLNESS
[FreeTextEntry1] : Mr. OSCAR SUGGS comes in today for his urologic follow-up and third scheduled intravesical instillation of gemcitabine. He had no side effects from the first two doses. Mr. 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 He underwent a cystoscopy, TURBT, and elective TURP on February 22, 2022. The pathology identified recurrent Ta, G3 urothelial carcinoma as well as carcinoma in situ. The TURP specimen showed a single focus of Grade Group 1 prostatic adenocarcinoma (see below). \par \par Mr. 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 He completed two 6-week induction courses of BCG (summer 2018 and March 2020). \par \par Post-TURP, he continues to have moderate obstructive and irritative voiding symptoms with nocturia x2.   He was recently treated for a UTI and completed a course of Bactrim DS.  He is still on afluzosin and finasteride. \par \par Mr. Suggs also has erectile dysfunction but chooses not to have treatment for this. \par \par PSAs: 10/18/21--1.65; 6/3/20--3.3; 6/4/19--3.5; 4/11/18--2.2; 12/6/17--3.2; 6/17/16--1.9\par \par Pathology (TURBTs): 2/22/22--TaG3, CIS in bladder specimen. [place] 3+3 prostatic adenocarcinoma in 5% of one chip from the TURP specimen; 3/15/21--TaG3 (muscularis propria not present); 11/19/19--CIS (diverticular wall bx); 5/15/18--Ta,G3 and CIS; 6/13/17--TaG3; 6/23/15--TaG3; 6/3/13--Neg; 9/21/10--Neg; 9/3/10--T1G3; 7/1/10--T1G3.\par \par CT Urogram: 6/17/20--Mild diffuse circumferential mural thickening of the bladder with a large left-sided diverticulum. No discrete bladder mass. Large right inguinal hernia; 4/24/18--5mm anterior enhancing bladder nodule. Left-sided diverticulum. Bilat renal cysts. Large right inguinal hernia. Colonic diverticula.

## 2022-05-10 NOTE — PHYSICAL EXAM
[FreeTextEntry1] : Testicular asymmetry (Rt >Lt).  Annular foreskin scar/blanching with phimosis--likely LSA. Nodular prostate.

## 2022-05-10 NOTE — ADDENDUM
[FreeTextEntry1] : A portion of this note was written by [Miller Anna] on 04/13/2022 acting as a scribe for Dr. Pereira. \par \par I have personally reviewed the chart and agree that the record accurately reflects my personal performance of the history, physical exam, assessment, and plan.

## 2022-06-29 ENCOUNTER — RX RENEWAL (OUTPATIENT)
Age: 77
End: 2022-06-29

## 2022-09-13 ENCOUNTER — APPOINTMENT (OUTPATIENT)
Dept: UROLOGY | Facility: CLINIC | Age: 77
End: 2022-09-13

## 2022-09-13 VITALS
TEMPERATURE: 97.5 F | SYSTOLIC BLOOD PRESSURE: 180 MMHG | HEART RATE: 71 BPM | DIASTOLIC BLOOD PRESSURE: 75 MMHG | OXYGEN SATURATION: 97 %

## 2022-09-13 DIAGNOSIS — J44.9 CHRONIC OBSTRUCTIVE PULMONARY DISEASE, UNSPECIFIED: ICD-10-CM

## 2022-09-13 DIAGNOSIS — C34.90 MALIGNANT NEOPLASM OF UNSPECIFIED PART OF UNSPECIFIED BRONCHUS OR LUNG: ICD-10-CM

## 2022-09-13 LAB
BILIRUB UR QL STRIP: NORMAL
CLARITY UR: CLEAR
COLLECTION METHOD: NORMAL
GLUCOSE UR-MCNC: NORMAL
HCG UR QL: 0.2 EU/DL
HGB UR QL STRIP.AUTO: NORMAL
KETONES UR-MCNC: NORMAL
LEUKOCYTE ESTERASE UR QL STRIP: NORMAL
NITRITE UR QL STRIP: NORMAL
PH UR STRIP: 5
PROT UR STRIP-MCNC: 300
SP GR UR STRIP: 1.02

## 2022-09-13 PROCEDURE — 99215 OFFICE O/P EST HI 40 MIN: CPT | Mod: 25

## 2022-09-13 PROCEDURE — 81003 URINALYSIS AUTO W/O SCOPE: CPT | Mod: QW

## 2022-09-13 PROCEDURE — 52281 CYSTOSCOPY AND TREATMENT: CPT

## 2022-09-13 PROCEDURE — 76857 US EXAM PELVIC LIMITED: CPT

## 2022-09-13 NOTE — ADDENDUM
[FreeTextEntry1] : A portion of this note was written by [Miller Anna] on 09/12/2022 acting as a scribe for Dr. Pereira. \par \par I have personally reviewed the chart and agree that the record accurately reflects my personal performance of the history, physical exam, assessment, and plan.

## 2022-09-13 NOTE — HISTORY OF PRESENT ILLNESS
[FreeTextEntry1] : Mr. OSCAR SUGGS comes in today for followup and a scheduled interval cystoscopy.  \par \par Mr. 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 He underwent a cystoscopy, TURBT, and elective TURP on February 22, 2022. The pathology identified recurrent Ta, G3 urothelial carcinoma as well as carcinoma in situ. He initially wanted to purse intravesical instillation of gemcitabine, but discontinued this regimen after two sessions. Mr. Suggs was first diagnosed with non-muscule invasive urothelial cell carcinoma in July 2010 and has had multiple recurrences (see below). He completed two 6-week induction courses of BCG (summer 2018 and March 2020). He has adamantly refused a radical cystectomy. In April 2022 he had 2 intravesical courses of gemcitabine but discontinued for reasons that are not clear.\par \par From his general urologic history, Mr. SUGGS reports moderate chronic lower urinary tract symptoms, with nocturia x 2-3. He feels his urination has improved since the TURP. \par Sono (performed to assess bladder emptying): 170cc PVR (includes a 63cc left diverticulum); Prostate 20cc\par \par The TURP specimen showed a single focus of Grade Group 1 prostatic adenocarcinoma (see below). \par \par Mr. Suggs also has erectile dysfunction but chooses not to have treatment for this. \par \par PSAs: 10/18/21--1.65; 6/3/20--3.3; 6/4/19--3.5; 4/11/18--2.2; 12/6/17--3.2; 6/17/16--1.9\par \par Creat: 6/15/22--1.06\par \par Pathology (TURBTs): 2/22/22--TaG3, CIS in bladder specimen. [place] 3+3 prostatic adenocarcinoma in 5% of one chip from the TURP specimen; 3/15/21--TaG3 (muscularis propria not present); 11/19/19--CIS (diverticular wall bx); 5/15/18--Ta,G3 and CIS; 6/13/17--TaG3; 6/23/15--TaG3; 6/3/13--Neg; 9/21/10--Neg; 9/3/10--T1G3; 7/1/10--T1G3.\par \par CT: 10/29/21--No evidence of metastatic disease; 6/17/20--Mild diffuse circumferential mural thickening of the bladder with a large left-sided diverticulum. No discrete bladder mass. Large right inguinal hernia; 4/24/18--5mm anterior enhancing bladder nodule. Left-sided diverticulum. Bilat renal cysts. Large right inguinal hernia. Colonic diverticula.

## 2022-09-13 NOTE — ASSESSMENT
[FreeTextEntry1] : I discussed the findings and options with Mr. OSCAR SUGGS and his nephew in detail.  Today's cystoscopy shows diffuse areas of bladder erythema mostly around and within  the left lateral diverticulum.  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 Suggs to consider treatment.  I do not feel that additional  bladder biopsies are necessary as the diagnosis of CIS is clear and it has already been confirmed histologically. He does not want a radical cystectomy understanding that that is the standard of care.  He is now willing to consider the pembrolizumab study with Dr. Dana Whipple and I urged him to set up an appointment with her soon as possible. \par \par We will defer any further evaluation for the incidentally identified Grade Group 1 prostate cancer (i.e. prostate MRI and biopsy). A PSA was drawn.\par \par Mr. Suggs will defer the right inguinal herniorrhaphy with Dr. Whalen until the bladder cancer has been addressed.\par \par A CT of the abdomen, pelvis and chest, and urine culture and cytology are pending. \par \par \par \par \par \par

## 2022-09-13 NOTE — PHYSICAL EXAM
[General Appearance - Well Developed] : well developed [General Appearance - Well Nourished] : well nourished [Normal Appearance] : normal appearance [Well Groomed] : well groomed [General Appearance - In No Acute Distress] : no acute distress [Abdomen Soft] : soft [Abdomen Tenderness] : non-tender [Costovertebral Angle Tenderness] : no ~M costovertebral angle tenderness [Urethral Meatus] : meatus normal [Urinary Bladder Findings] : the bladder was normal on palpation [Scrotum] : the scrotum was normal [Testes Mass (___cm)] : there were no testicular masses [Edema] : no peripheral edema [] : no respiratory distress [Respiration, Rhythm And Depth] : normal respiratory rhythm and effort [Exaggerated Use Of Accessory Muscles For Inspiration] : no accessory muscle use [Oriented To Time, Place, And Person] : oriented to person, place, and time [Affect] : the affect was normal [Mood] : the mood was normal [Not Anxious] : not anxious [Normal Station and Gait] : the gait and station were normal for the patient's age [No Focal Deficits] : no focal deficits [No Palpable Adenopathy] : no palpable adenopathy [Penis Abnormality] : normal uncircumcised penis [Epididymis] : the epididymides were normal [Testes Tenderness] : no tenderness of the testes [Prostate Tenderness] : the prostate was not tender [No Prostate Nodules] : no prostate nodules [Skin Color & Pigmentation] : normal skin color and pigmentation [Heart Rate And Rhythm] : Heart rate and rhythm were normal [FreeTextEntry1] : Testicular asymmetry (Rt >Lt).  Annular foreskin scar/blanching with partial phimosis--likely LSA.  There is a new fissure on the dorsal aspect of the foreskin.

## 2022-09-14 LAB — PSA SERPL-MCNC: 0.51 NG/ML

## 2022-09-16 RX ORDER — AMPICILLIN 500 MG/1
500 CAPSULE ORAL
Qty: 15 | Refills: 0 | Status: ACTIVE | COMMUNITY
Start: 2022-09-16 | End: 1900-01-01

## 2022-09-19 ENCOUNTER — NON-APPOINTMENT (OUTPATIENT)
Age: 77
End: 2022-09-19

## 2022-09-19 LAB
BACTERIA UR CULT: ABNORMAL
URINE CYTOLOGY: NORMAL

## 2022-09-27 ENCOUNTER — APPOINTMENT (OUTPATIENT)
Dept: UROLOGY | Facility: CLINIC | Age: 77
End: 2022-09-27

## 2022-09-27 VITALS
DIASTOLIC BLOOD PRESSURE: 72 MMHG | HEIGHT: 67.5 IN | WEIGHT: 178 LBS | TEMPERATURE: 97.6 F | SYSTOLIC BLOOD PRESSURE: 169 MMHG | HEART RATE: 72 BPM | BODY MASS INDEX: 27.61 KG/M2

## 2022-09-27 DIAGNOSIS — K42.9 UMBILICAL HERNIA W/OUT OBSTRUCTION OR GANGRENE: ICD-10-CM

## 2022-09-27 LAB
BILIRUB UR QL STRIP: NORMAL
CLARITY UR: NORMAL
COLLECTION METHOD: NORMAL
GLUCOSE UR-MCNC: NORMAL
HCG UR QL: 0.2 EU/DL
HGB UR QL STRIP.AUTO: NORMAL
KETONES UR-MCNC: NORMAL
LEUKOCYTE ESTERASE UR QL STRIP: NORMAL
NITRITE UR QL STRIP: NORMAL
PH UR STRIP: 8.5
PROT UR STRIP-MCNC: 100
SP GR UR STRIP: 1.01

## 2022-09-27 PROCEDURE — 99215 OFFICE O/P EST HI 40 MIN: CPT

## 2022-09-27 NOTE — ADDENDUM
[FreeTextEntry1] : A portion of this note was written by [Miller Anna] on 09/23/2022 acting as a scribe for Dr. Pereira. \par \par I have personally reviewed the chart and agree that the record accurately reflects my personal performance of the history, physical exam, assessment, and plan.

## 2022-09-27 NOTE — ASSESSMENT
[FreeTextEntry1] : I discussed the findings and options with Mr. OSCAR SUGGS, his wife and his nephew in detail.  After meeting with Dr. Dick he is leaning towards having a radical cystectomy and neobladder at [place].  I reviewed the procedure with him and stressed the fact that he will likely need to irrigate his bladder for a finite period of time and will also likely experience nocturnal enuresis.  However this approach is preferred to the pembrolizumab study which is experimental.  I advised that he asked that a general surgeon be available to correct the right inguinal hernia at that time.  \par \par \par \par \par

## 2022-09-27 NOTE — PHYSICAL EXAM
[General Appearance - Well Developed] : well developed [General Appearance - Well Nourished] : well nourished [Normal Appearance] : normal appearance [Well Groomed] : well groomed [General Appearance - In No Acute Distress] : no acute distress [Abdomen Soft] : soft [Abdomen Tenderness] : non-tender [Costovertebral Angle Tenderness] : no ~M costovertebral angle tenderness [Urethral Meatus] : meatus normal [Penis Abnormality] : normal uncircumcised penis [Urinary Bladder Findings] : the bladder was normal on palpation [Scrotum] : the scrotum was normal [Epididymis] : the epididymides were normal [Testes Tenderness] : no tenderness of the testes [Testes Mass (___cm)] : there were no testicular masses [Prostate Tenderness] : the prostate was not tender [No Prostate Nodules] : no prostate nodules [Skin Color & Pigmentation] : normal skin color and pigmentation [Heart Rate And Rhythm] : Heart rate and rhythm were normal [Edema] : no peripheral edema [] : no respiratory distress [Respiration, Rhythm And Depth] : normal respiratory rhythm and effort [Exaggerated Use Of Accessory Muscles For Inspiration] : no accessory muscle use [Oriented To Time, Place, And Person] : oriented to person, place, and time [Affect] : the affect was normal [Mood] : the mood was normal [Not Anxious] : not anxious [Normal Station and Gait] : the gait and station were normal for the patient's age [No Focal Deficits] : no focal deficits [No Palpable Adenopathy] : no palpable adenopathy [FreeTextEntry1] : Testicular asymmetry (Rt >Lt).  Annular foreskin scar/blanching with partial phimosis--likely LSA.  There is a new fissure on the dorsal aspect of the foreskin.

## 2022-09-27 NOTE — HISTORY OF PRESENT ILLNESS
[FreeTextEntry1] : Mr. OSCAR SUGGS comes in today for followup. He has seen a doctor at [facility] (Dr. Rolando Palacios)  in consultation for his BCG-unresponsive non-muscle invasive urothelial cell carcinoma. \par \par Mr. 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 He underwent a cystoscopy, TURBT, and elective TURP on February 22, 2022. The pathology identified recurrent Ta, G3 urothelial carcinoma as well as carcinoma in situ. He initially wanted to purse intravesical instillation of gemcitabine, but discontinued this regimen after two courses in April 2022. Mr. Suggs was first diagnosed with non-muscule invasive urothelial cell carcinoma in July 2010 and has had multiple recurrences (see below). He completed two 6-week induction courses of BCG (summer 2018 and March 2020). He had adamantly refused a radical cystectomy.\par \par From his general urologic history, Mr. SUGGS reports moderate chronic lower urinary tract symptoms, with nocturia x 2-3. He feels his urination has improved since the TURP. \par \par The TURP specimen showed a single focus of Grade Group 1 prostatic adenocarcinoma (see below). \par \par Mr. Suggs also has erectile dysfunction but chooses not to have treatment for this. \par \par PSAs: 9/14/22--0.51; 10/18/21--1.65; 6/3/20--3.3; 6/4/19--3.5; 4/11/18--2.2; 12/6/17--3.2; 6/17/16--1.9\par \par Creat: 6/15/22--1.06\par \par Pathology (TURBTs): 2/22/22--TaG3, CIS in bladder specimen. [place] 3+3 prostatic adenocarcinoma in 5% of one chip from the TURP specimen; 3/15/21--TaG3 (muscularis propria not present); 11/19/19--CIS (diverticular wall bx); 5/15/18--Ta,G3 and CIS; 6/13/17--TaG3; 6/23/15--TaG3; 6/3/13--Neg; 9/21/10--Neg; 9/3/10--T1G3; 7/1/10--T1G3.\par \par CT: 9/14/22-- No evidence of metastatic disease, bilateral renal cysts, large bladder diverticulum, large right inguinal hernia; 10/29/21--No evidence of metastatic disease; 6/17/20--Mild diffuse circumferential mural thickening of the bladder with a large left-sided diverticulum. No discrete bladder mass. Large right inguinal hernia; 4/24/18--5mm anterior enhancing bladder nodule. Left-sided diverticulum. Bilat renal cysts. Large right inguinal hernia. Colonic diverticula.

## 2022-09-30 LAB — BACTERIA UR CULT: ABNORMAL

## 2022-10-24 RX ORDER — ALFUZOSIN HYDROCHLORIDE 10 MG/1
10 TABLET, EXTENDED RELEASE ORAL
Qty: 90 | Refills: 3 | Status: DISCONTINUED | COMMUNITY
Start: 2020-01-29 | End: 2022-10-24

## 2023-03-04 NOTE — ED ADULT TRIAGE NOTE - NS ED NURSE DIRECT TO ROOM YN
88 y/o male with h/o CHF, HTN on lisinopril/HTZ, BPH was admitted on 2/24 for increasing dyspnea on exertion, dizziness and new onset A.fib. Pt reported dyspnea over the last 2 weeks PTA. He has dyspnea at rest and has noticed increased leg swelling, as noted by his daughters here at bedside in the ED. His initial COVID test was negative, but then on 2/27 he was noted COVID PCR detected. He is SOB on supplemental O2 therapy.     1. Acute respiratory failure improving. COVID-19 viral syndrome. Multifocal pneumonia. CHF exacerbation. CRF stage 3.  -respiratory frail, but improving  -cultures noted  -on remdesivir protocol # 4  -tolerating abx well so far; no side effects noted  -O2 therapy  -steroids  -AC  -droplet isolation  -respiratory care  -continue antiviral therapy  -monitor temps  -f/u CBC  -supportive care  2. Other issues:   -care per medicine   No

## 2023-04-29 ENCOUNTER — EMERGENCY (EMERGENCY)
Facility: HOSPITAL | Age: 78
LOS: 1 days | Discharge: HOME | End: 2023-04-29
Attending: EMERGENCY MEDICINE
Payer: MEDICARE

## 2023-04-29 VITALS
HEART RATE: 89 BPM | DIASTOLIC BLOOD PRESSURE: 67 MMHG | RESPIRATION RATE: 16 BRPM | SYSTOLIC BLOOD PRESSURE: 116 MMHG | OXYGEN SATURATION: 100 %

## 2023-04-29 VITALS
RESPIRATION RATE: 20 BRPM | TEMPERATURE: 98 F | HEIGHT: 67 IN | SYSTOLIC BLOOD PRESSURE: 111 MMHG | HEART RATE: 83 BPM | OXYGEN SATURATION: 98 % | DIASTOLIC BLOOD PRESSURE: 64 MMHG | WEIGHT: 154.98 LBS

## 2023-04-29 DIAGNOSIS — Z41.9 ENCOUNTER FOR PROCEDURE FOR PURPOSES OTHER THAN REMEDYING HEALTH STATE, UNSPECIFIED: Chronic | ICD-10-CM

## 2023-04-29 DIAGNOSIS — Z98.890 OTHER SPECIFIED POSTPROCEDURAL STATES: Chronic | ICD-10-CM

## 2023-04-29 DIAGNOSIS — Z90.49 ACQUIRED ABSENCE OF OTHER SPECIFIED PARTS OF DIGESTIVE TRACT: Chronic | ICD-10-CM

## 2023-04-29 PROCEDURE — 99283 EMERGENCY DEPT VISIT LOW MDM: CPT | Mod: GC

## 2023-04-29 PROCEDURE — 51702 INSERT TEMP BLADDER CATH: CPT

## 2023-04-29 PROCEDURE — 99283 EMERGENCY DEPT VISIT LOW MDM: CPT | Mod: 25

## 2023-04-29 NOTE — ED ADULT NURSE NOTE - CAS TRG GEN SKIN COLOR
COVID-19 PCR test completed. Patient handout For Patients Who Have Been Tested for Covid-19 (Coronavirus) was given to the patient, which includes test result notification process.     Normal for race

## 2023-04-29 NOTE — ED PROVIDER NOTE - NSFOLLOWUPINSTRUCTIONS_ED_ALL_ED_FT
Urinary Retention    Urinary retention is the inability to completely empty your bladder. This is a common problem in older men, especially with enlarged prostates. If you are sent home with a potter catheter and a drainage system make sure to keep the drainage bag emptied and lower than your catheter. Keep the potter catheter in until you follow up with a urologist.    SEEK IMMEDIATE MEDICAL CARE IF YOU DEVELOP THE FOLLOWING SYMPTOMS: the catheter stops draining urine, the catheter falls out, abdominal pain, nausea/vomiting, or chills/fever.    Please follow up with your urologist within 1 week.

## 2023-04-29 NOTE — ED PROVIDER NOTE - ATTENDING CONTRIBUTION TO CARE
bilateral edema
attending Russell: 78yM h/o HTN, DM, HLD, bladder CA s/p resection, neobladder procedure done at [place] in November p/w Potter catheter not draining since this morning. Potter placed on Thursday for retention diagnosed with UTI started on ciprofloxacin.  Exam as above. Will replace potter, pt has urology follow-up scheduled for this week

## 2023-04-29 NOTE — ED PROVIDER NOTE - CLINICAL SUMMARY MEDICAL DECISION MAKING FREE TEXT BOX
78-year-old male past medical history hypertension diabetes hyperlipidemia  bladder CA status post resection, neobladder procedure done at [place] in November presents with Thompson was not draining. Vital signs stable abdomen soft nontender nondistended.  Concern for Thompson malfunction.  Will replace Thompson   Ensure patient is able to void and likely discharge with urology follow-up.

## 2023-04-29 NOTE — ED PROVIDER NOTE - NSICDXPASTSURGICALHX_GEN_ALL_CORE_FT
PAST SURGICAL HISTORY:  Elective surgery left ear cyst removal    Elective surgery Nasal cyst removal    History of cholecystectomy     History of surgery left lung lobectomy    History of surgery TURB

## 2023-04-29 NOTE — ED PROVIDER NOTE - NSICDXPASTMEDICALHX_GEN_ALL_CORE_FT
PAST MEDICAL HISTORY:  Bladder cancer     Diabetes mellitus     Diabetes mellitus, type 2     GI bleeding     History of COPD     HLD (hyperlipidemia)     Hypertension     Lung cancer     Malignant neoplasm of bladder     SOB (shortness of breath)

## 2023-04-29 NOTE — ED PROVIDER NOTE - PATIENT PORTAL LINK FT
You can access the FollowMyHealth Patient Portal offered by [facility] by registering at the following website: [email or web address]. By joining [employer]’s FollowMyHealth portal, you will also be able to view your health information using other applications (apps) compatible with our system.

## 2023-04-29 NOTE — ED PROVIDER NOTE - OBJECTIVE STATEMENT
78-year-old male past medical history hypertension diabetes hyperlipidemia  bladder CA status post resection, neobladder procedure done at [place] in November presents with Thompson was not draining.  Reports had Thompson placed on Thursday for retention diagnosed with UTI started on ciprofloxacin.  Thompson had been working appropriately until this morning where no drainage was coming out.  Patient denies any hematuria or changes in urine.  No fevers chills.  No abdominal pain.  Denies any other symptoms

## 2023-04-29 NOTE — ED PROVIDER NOTE - PHYSICAL EXAMINATION
Gen: NAD, non-toxic appearing  Head: normal appearing  HEENT: normal conjunctiva, oral mucosa moist  Lung: no respiratory distress, speaking in full sentences, CTA b/l     CV: regular rate and rhythm, no murmurs  Abd: soft, non distended, non tender   MSK: no visible deformities  Neuro: No focal deficits, AAOx3  Skin: Warm  Psych: normal affect  :  Thompson in place not draining urine.  No penile bleeding or drainage

## 2023-04-29 NOTE — ED ADULT NURSE NOTE - OBJECTIVE STATEMENT
78 y old male with PMH of DM2, bladder cancer, COPD, lung cancer, HTN, HLD, and GI bleed presents to the ED from home c/o potter catheter complications. 78 y old male with PMH of DM2, bladder cancer, COPD, lung cancer, HTN, HLD, and GI bleed presents to the ED from home c/o potetr catheter complications. Pt reports Potter catheter was placed on Thursday. Pt reports he was started on Cipro for a UTI on Thursday. Pt reports this morning he noticed his potter catheter was no longer draining. Denies Fevers, chills, abd pain, n/v/d. Pt A&O x 4, ambulatory. Respirations nonlabored on RA. Potter catheter with empty leg bag noted. Skin warm, dry and intact. Stretcher locked in lowest position, side rails up and call bell in reach.

## 2023-04-29 NOTE — ED ADULT NURSE REASSESSMENT NOTE - NS ED NURSE REASSESS COMMENT FT1
Thompson catheter placed with two RNs at bedside. Sterile technique maintained. 250 ccs of clear, yellow urine drained.

## 2023-04-29 NOTE — ED ADULT NURSE NOTE - NS ED PATIENT SAFETY CONCERN
No [FreeTextEntry1] : The patient reports that he has had bunion surgery between [id number]. Patient reports having chronic foot pain.

## 2023-05-07 ENCOUNTER — TRANSCRIPTION ENCOUNTER (OUTPATIENT)
Age: 78
End: 2023-05-07

## 2023-05-07 ENCOUNTER — EMERGENCY (EMERGENCY)
Facility: HOSPITAL | Age: 78
LOS: 1 days | Discharge: HOME | End: 2023-05-07
Attending: EMERGENCY MEDICINE
Payer: MEDICARE

## 2023-05-07 VITALS
WEIGHT: 154.98 LBS | OXYGEN SATURATION: 98 % | DIASTOLIC BLOOD PRESSURE: 56 MMHG | HEART RATE: 83 BPM | TEMPERATURE: 98 F | SYSTOLIC BLOOD PRESSURE: 90 MMHG | HEIGHT: 67 IN | RESPIRATION RATE: 20 BRPM

## 2023-05-07 VITALS
TEMPERATURE: 98 F | SYSTOLIC BLOOD PRESSURE: 107 MMHG | RESPIRATION RATE: 18 BRPM | DIASTOLIC BLOOD PRESSURE: 55 MMHG | OXYGEN SATURATION: 100 % | HEART RATE: 63 BPM

## 2023-05-07 DIAGNOSIS — Z98.890 OTHER SPECIFIED POSTPROCEDURAL STATES: Chronic | ICD-10-CM

## 2023-05-07 DIAGNOSIS — Z41.9 ENCOUNTER FOR PROCEDURE FOR PURPOSES OTHER THAN REMEDYING HEALTH STATE, UNSPECIFIED: Chronic | ICD-10-CM

## 2023-05-07 DIAGNOSIS — Z90.49 ACQUIRED ABSENCE OF OTHER SPECIFIED PARTS OF DIGESTIVE TRACT: Chronic | ICD-10-CM

## 2023-05-07 LAB
ALBUMIN SERPL ELPH-MCNC: 4.5 G/DL — SIGNIFICANT CHANGE UP (ref 3.3–5)
ALP SERPL-CCNC: 52 U/L — SIGNIFICANT CHANGE UP (ref 40–120)
ALT FLD-CCNC: 15 U/L — SIGNIFICANT CHANGE UP (ref 10–45)
ANION GAP SERPL CALC-SCNC: 21 MMOL/L — HIGH (ref 5–17)
APPEARANCE UR: ABNORMAL
APTT BLD: 33.4 SEC — SIGNIFICANT CHANGE UP (ref 27.5–35.5)
AST SERPL-CCNC: 14 U/L — SIGNIFICANT CHANGE UP (ref 10–40)
BACTERIA # UR AUTO: NEGATIVE — SIGNIFICANT CHANGE UP
BASOPHILS # BLD AUTO: 0.03 K/UL — SIGNIFICANT CHANGE UP (ref 0–0.2)
BASOPHILS NFR BLD AUTO: 0.3 % — SIGNIFICANT CHANGE UP (ref 0–2)
BILIRUB SERPL-MCNC: 0.2 MG/DL — SIGNIFICANT CHANGE UP (ref 0.2–1.2)
BILIRUB UR-MCNC: NEGATIVE — SIGNIFICANT CHANGE UP
BUN SERPL-MCNC: 82 MG/DL — HIGH (ref 7–23)
CALCIUM SERPL-MCNC: 10.2 MG/DL — SIGNIFICANT CHANGE UP (ref 8.4–10.5)
CHLORIDE SERPL-SCNC: 103 MMOL/L — SIGNIFICANT CHANGE UP (ref 96–108)
CO2 SERPL-SCNC: 13 MMOL/L — LOW (ref 22–31)
COD CRY URNS QL: ABNORMAL
COLOR SPEC: SIGNIFICANT CHANGE UP
CREAT SERPL-MCNC: 2.29 MG/DL — HIGH (ref 0.5–1.3)
DIFF PNL FLD: NEGATIVE — SIGNIFICANT CHANGE UP
EGFR: 28 ML/MIN/1.73M2 — LOW
EOSINOPHIL # BLD AUTO: 0.12 K/UL — SIGNIFICANT CHANGE UP (ref 0–0.5)
EOSINOPHIL NFR BLD AUTO: 1.1 % — SIGNIFICANT CHANGE UP (ref 0–6)
EPI CELLS # UR: 13 /HPF — HIGH
GLUCOSE SERPL-MCNC: 123 MG/DL — HIGH (ref 70–99)
GLUCOSE UR QL: NEGATIVE — SIGNIFICANT CHANGE UP
HCT VFR BLD CALC: 35.3 % — LOW (ref 39–50)
HGB BLD-MCNC: 11.7 G/DL — LOW (ref 13–17)
HYALINE CASTS # UR AUTO: 0 /LPF — SIGNIFICANT CHANGE UP (ref 0–7)
IMM GRANULOCYTES NFR BLD AUTO: 0.6 % — SIGNIFICANT CHANGE UP (ref 0–0.9)
INR BLD: 1.12 RATIO — SIGNIFICANT CHANGE UP (ref 0.88–1.16)
KETONES UR-MCNC: NEGATIVE — SIGNIFICANT CHANGE UP
LEUKOCYTE ESTERASE UR-ACNC: ABNORMAL
LYMPHOCYTES # BLD AUTO: 1.99 K/UL — SIGNIFICANT CHANGE UP (ref 1–3.3)
LYMPHOCYTES # BLD AUTO: 19 % — SIGNIFICANT CHANGE UP (ref 13–44)
MCHC RBC-ENTMCNC: 28.6 PG — SIGNIFICANT CHANGE UP (ref 27–34)
MCHC RBC-ENTMCNC: 33.1 GM/DL — SIGNIFICANT CHANGE UP (ref 32–36)
MCV RBC AUTO: 86.3 FL — SIGNIFICANT CHANGE UP (ref 80–100)
MONOCYTES # BLD AUTO: 0.54 K/UL — SIGNIFICANT CHANGE UP (ref 0–0.9)
MONOCYTES NFR BLD AUTO: 5.1 % — SIGNIFICANT CHANGE UP (ref 2–14)
NEUTROPHILS # BLD AUTO: 7.76 K/UL — HIGH (ref 1.8–7.4)
NEUTROPHILS NFR BLD AUTO: 73.9 % — SIGNIFICANT CHANGE UP (ref 43–77)
NITRITE UR-MCNC: NEGATIVE — SIGNIFICANT CHANGE UP
NRBC # BLD: 0 /100 WBCS — SIGNIFICANT CHANGE UP (ref 0–0)
PH UR: 6 — SIGNIFICANT CHANGE UP (ref 5–8)
PLATELET # BLD AUTO: 349 K/UL — SIGNIFICANT CHANGE UP (ref 150–400)
POTASSIUM SERPL-MCNC: 4.4 MMOL/L — SIGNIFICANT CHANGE UP (ref 3.5–5.3)
POTASSIUM SERPL-SCNC: 4.4 MMOL/L — SIGNIFICANT CHANGE UP (ref 3.5–5.3)
PROT SERPL-MCNC: 7.4 G/DL — SIGNIFICANT CHANGE UP (ref 6–8.3)
PROT UR-MCNC: ABNORMAL
PROTHROM AB SERPL-ACNC: 13 SEC — SIGNIFICANT CHANGE UP (ref 10.5–13.4)
RAPID RVP RESULT: SIGNIFICANT CHANGE UP
RBC # BLD: 4.09 M/UL — LOW (ref 4.2–5.8)
RBC # FLD: 16.6 % — HIGH (ref 10.3–14.5)
RBC CASTS # UR COMP ASSIST: 2 /HPF — SIGNIFICANT CHANGE UP (ref 0–4)
SARS-COV-2 RNA SPEC QL NAA+PROBE: SIGNIFICANT CHANGE UP
SODIUM SERPL-SCNC: 137 MMOL/L — SIGNIFICANT CHANGE UP (ref 135–145)
SP GR SPEC: 1.01 — SIGNIFICANT CHANGE UP (ref 1.01–1.02)
UROBILINOGEN FLD QL: NEGATIVE — SIGNIFICANT CHANGE UP
WBC # BLD: 10.5 K/UL — SIGNIFICANT CHANGE UP (ref 3.8–10.5)
WBC # FLD AUTO: 10.5 K/UL — SIGNIFICANT CHANGE UP (ref 3.8–10.5)
WBC UR QL: 12 /HPF — HIGH (ref 0–5)

## 2023-05-07 PROCEDURE — 82962 GLUCOSE BLOOD TEST: CPT

## 2023-05-07 PROCEDURE — 93005 ELECTROCARDIOGRAM TRACING: CPT

## 2023-05-07 PROCEDURE — 85610 PROTHROMBIN TIME: CPT

## 2023-05-07 PROCEDURE — 81001 URINALYSIS AUTO W/SCOPE: CPT

## 2023-05-07 PROCEDURE — 80053 COMPREHEN METABOLIC PANEL: CPT

## 2023-05-07 PROCEDURE — 87040 BLOOD CULTURE FOR BACTERIA: CPT

## 2023-05-07 PROCEDURE — 99285 EMERGENCY DEPT VISIT HI MDM: CPT | Mod: 25

## 2023-05-07 PROCEDURE — 85025 COMPLETE CBC W/AUTO DIFF WBC: CPT

## 2023-05-07 PROCEDURE — 71045 X-RAY EXAM CHEST 1 VIEW: CPT | Mod: 26

## 2023-05-07 PROCEDURE — 87086 URINE CULTURE/COLONY COUNT: CPT

## 2023-05-07 PROCEDURE — 85730 THROMBOPLASTIN TIME PARTIAL: CPT

## 2023-05-07 PROCEDURE — 0225U NFCT DS DNA&RNA 21 SARSCOV2: CPT

## 2023-05-07 PROCEDURE — 99285 EMERGENCY DEPT VISIT HI MDM: CPT | Mod: FS

## 2023-05-07 PROCEDURE — 87186 SC STD MICRODIL/AGAR DIL: CPT

## 2023-05-07 PROCEDURE — 84145 PROCALCITONIN (PCT): CPT

## 2023-05-07 PROCEDURE — 71045 X-RAY EXAM CHEST 1 VIEW: CPT

## 2023-05-07 PROCEDURE — 36415 COLL VENOUS BLD VENIPUNCTURE: CPT

## 2023-05-07 RX ORDER — SODIUM CHLORIDE 9 MG/ML
1000 INJECTION, SOLUTION INTRAVENOUS ONCE
Refills: 0 | Status: COMPLETED | OUTPATIENT
Start: 2023-05-07 | End: 2023-05-07

## 2023-05-07 RX ORDER — SODIUM CHLORIDE 9 MG/ML
100 INJECTION, SOLUTION INTRAVENOUS ONCE
Refills: 0 | Status: DISCONTINUED | OUTPATIENT
Start: 2023-05-07 | End: 2023-05-07

## 2023-05-07 RX ADMIN — SODIUM CHLORIDE 1000 MILLILITER(S): 9 INJECTION, SOLUTION INTRAVENOUS at 20:00

## 2023-05-07 NOTE — ED PROVIDER NOTE - ATTENDING APP SHARED VISIT CONTRIBUTION OF CARE
Private Physician Mick Daniels. PCP, Rolando Reid [facility]  78y male pmh Bladder Ca sp Neobladder, Lung Ca Sp resection no chemo/rt, DMT2 on glipizide. Pt on day 9 of cipro for UTI. PT C/O feeling dizzy today. PT was with family checked glu was 56 and bp was 70's systolic. Pt family states he looking mod improved after giving glu. Private Physician Mick Daniels. PCP, Rolando Reid [facility]  78y male pmh Bladder Ca sp Neobladder, Lung Ca Sp resection no chemo/rt, DMT2 on glipizide. Pt on day 9 of cipro for UTI. PT C/O feeling dizzy today. PT was with family checked glu was 56 and bp was 70's systolic. Pt family states he looking mod improved after giving glu. Pt only complaint currently is "little dizzy" Has lost 30 lb over past several month 2/2 anorexia. PE WDWN male awake alert normocephalic atraumatic neck supple chest clear anterior & posterior cv no rubs, gallops or murmurs abd soft +bs no mass guarding , neuro gcs 15 speech fluent power 5/5 all extr  Chas Vasquez MD, Facep

## 2023-05-07 NOTE — ED ADULT NURSE REASSESSMENT NOTE - NS ED NURSE REASSESS COMMENT FT1
Patient a/ox4, lying in the stretcher. Patient endorses desire to AMA. Patient educated by MD HERNANDEZ. IV removed. no consult

## 2023-05-07 NOTE — ED PROVIDER NOTE - ADDITIONAL NOTES AND INSTRUCTIONS:
Please excuse from work/school as he/she was being evaluated in the Emergency Room at [facility].

## 2023-05-07 NOTE — ED PROVIDER NOTE - PATIENT PORTAL LINK FT
You can access the FollowMyHealth Patient Portal offered by [facility] by registering at the following website: [email or web address]. By joining [employer]’s FollowMyHealth portal, you will also be able to view your health information using other applications (apps) compatible with our system.

## 2023-05-07 NOTE — ED PROVIDER NOTE - OBJECTIVE STATEMENT
78-year-old male history of lung cancer status post resection, hypertension, diabetes on glimepiride and metformin, bladder cancer status post neobladder surgery November 2022 presents the emergency department for the evaluation of hypotension and hypoglycemia.  Patient is here accompanied by his spouse and son who helps to translate.  Patient declines translation services at this time.  Patient is on day 9 of ciprofloxacin for a presumed UTI and today started to feel lightheaded while driving.  Blood pressure was checked and it was 70/40 and fingerstick was in the 60s.  Fingerstick improved following ingestion of small volume of orange juice.  Patient feels improved at this time.  No fever, chills, nausea, vomiting.  No URI symptoms, cough.

## 2023-05-07 NOTE — ED ADULT TRIAGE NOTE - GLASGOW COMA SCALE: EYE OPENING, MLM
AF (atrial fibrillation)    BPH (benign prostatic hypertrophy)    CHF (congestive heart failure)    Chronic hypotension    COPD (chronic obstructive pulmonary disease)  4L home O2  DM (diabetes mellitus)  Off Insulin since 7/2017  ESRD (end stage renal disease) on dialysis    Gout    HLD (hyperlipidemia)    Myocardial infarction  10/2011  Seizure  Off Keppra since 7/2017
(E4) spontaneous

## 2023-05-07 NOTE — ED PROVIDER NOTE - NSFOLLOWUPINSTRUCTIONS_ED_ALL_ED_FT
-You were seen in the Emergency Department (ED) for low glucose and kidney injury. Lab and imaging results, if performed, were discussed with you along with your discharge diagnosis.    FOLLOW-UP:  -Please follow up with your PMD if symptoms return or for any concerning matter pertaining to your low glucose.  -Please follow up with your private physician within the next 72 hours. Tell them you were recently in the ED for an urgent issue and would like to be seen. Bring copies of your results if you were given.   -If you do not have a PMD, please call [phone] to find one convenient for you or call our clinic at [phone].    MEDICATIONS:  -Continue all other prescribed medicine, IF ANY, as per your primary care doctor's (PMD) recommendations.    PAIN CONTROL:  -Please take over the counter Tylenol (also known as acetaminophen) 650mg every 6 hours or Ibuprofen (also known as motrin, advil) 600mg every 8 hour for your pain, IF ANY, unless you are not supposed to for any reason.  -Rest, stay hydrated with plenty of fluids (drink at least 2 Liters or 64 Ounces of water each day UNLESS you are supposed to restrict fluids or ANY reason.    RETURN PRECAUTIONS:  -Please return to the Emergency Department if you experience ANY new or concerning symptoms, such as, but not limited to: worsening pain, large amount of bleeding, passing out, fever >100.F, shaking chills, inability to see or new double vision, chest pain, difficulty breathing, diffuse abdominal pain, unable to eat or drink, continuous vomiting or diarrhea, unable to move or feel part of your body

## 2023-05-07 NOTE — ED PROVIDER NOTE - PROGRESS NOTE DETAILS
Endorsed to Dr NICOLAS Vasquez MD, Facep The patient is clinically not intoxicated, free from distracting pain, appears to have intact insight, judgment and reason and in my medical opinion has the capacity to make decisions. The patient is also not under any duress to leave the hospital. In this scenario, it would be battery to subject a patient to treatment against his/her will.   I have voiced my concerns for the patient's health given that a full evaluation and treatment had not occurred. I have discussed the need for continued evaluation to determine if their symptoms are caused by a condition that present risk of death or morbidity. Risks including but not limited to death, permanent disability, prolonged hospitalization, prolonged illness, were discussed. I tried offering alternative options in hopes that the patient might be amenable to partial evaluation and treatment which would be medically beneficial to the patient, though the patient declined my options and insisted on leaving.   Because I have been unable to convince the patient to stay, I answered all of their questions about their condition and asked them to return to the ED as soon as possible to complete their evaluation, especially if their symptoms worsen or do not improve. I emphasized that leaving against medical advice does not preclude returning here for further evaluation. I asked the patient to return if they change their mind about the further evaluation and treatment. I strongly encouraged the patient to return to this Emergency Department or any Emergency Department at any time, particularly with worsening symptoms. Patient reassessed at this time, currently symptoms improved from initial evaluation.  Blood pressure at this time is 117/55 with a MAP of 71.  Patient's lab work reviewed and notable for new POLINA compared to the last set of lab work.  Patient also has significant weight loss and poor p.o. intake over the last few days.  Plan for admission for medical optimization and for failure to thrive and for new POLINA with hyper uremia.  Patient currently declining admission at this time since he would like to see his own physicians at [facility].  Multiple attempts were made to persuade patient to stay in the hospital for continued evaluation here.  Patient currently would like to sign out AGAINST MEDICAL ADVICE.  Strict return precautions relayed to patient.  All questions answered.  Family at bedside and agrees with plan to sign out AGAINST MEDICAL ADVICE.

## 2023-05-07 NOTE — ED ADULT NURSE NOTE - OBJECTIVE STATEMENT
79 y/o male presents to the ED brought in by family for hypotension and hypoglycemia. A/Ox3, unaware of year. PMH: Lung CA w/resection, Bladder CA s/p neobladder surgery and DM. Patient's family endorses recent UTI with patient on day 9 of ABX. Patient endorses lightheadedness. Patient's family noted his BP was "70s" and BG was "53". Patient denies chest pain, lightheadedness, dyspnea, fever, cough and chills. Safety and comfort provided.

## 2023-05-07 NOTE — ED PROVIDER NOTE - CLINICAL SUMMARY MEDICAL DECISION MAKING FREE TEXT BOX
Elderly male pmh as above pw episode of hypoglycemia and hypotension. Hypotension improved with fluid resuscitation. Labs revealed Shar. Plan was to check labs, xr and admit for SHAR marie  Chas Vasquez MD, Facep

## 2023-05-07 NOTE — ED PROVIDER NOTE - CARE PLAN
Principal Discharge DX:	POLINA (acute kidney injury)  Secondary Diagnosis:	Adult failure to thrive   1

## 2023-05-07 NOTE — ED PROVIDER NOTE - NS ED ATTENDING STATEMENT MOD
This was a shared visit with the MELVI. I reviewed and verified the documentation and independently performed the documented:

## 2023-05-10 LAB
-  AMIKACIN: SIGNIFICANT CHANGE UP
-  AZTREONAM: SIGNIFICANT CHANGE UP
-  CEFEPIME: SIGNIFICANT CHANGE UP
-  CEFTAZIDIME: SIGNIFICANT CHANGE UP
-  CIPROFLOXACIN: SIGNIFICANT CHANGE UP
-  GENTAMICIN: SIGNIFICANT CHANGE UP
-  IMIPENEM: SIGNIFICANT CHANGE UP
-  LEVOFLOXACIN: SIGNIFICANT CHANGE UP
-  MEROPENEM: SIGNIFICANT CHANGE UP
-  PIPERACILLIN/TAZOBACTAM: SIGNIFICANT CHANGE UP
-  TOBRAMYCIN: SIGNIFICANT CHANGE UP
CULTURE RESULTS: SIGNIFICANT CHANGE UP
METHOD TYPE: SIGNIFICANT CHANGE UP
ORGANISM # SPEC MICROSCOPIC CNT: SIGNIFICANT CHANGE UP
ORGANISM # SPEC MICROSCOPIC CNT: SIGNIFICANT CHANGE UP
SPECIMEN SOURCE: SIGNIFICANT CHANGE UP

## 2023-05-10 NOTE — ED POST DISCHARGE NOTE - DETAILS
5/11: patient with fluoroquinolone resistance, low colony count however it seems patient was already being treated outpatient. reacched [facility] as patients primary line with no answer or option for VM. Rolando states patient doing well but at physician now following up. advised that given on abx for 9 days already this appears to br MDR infection. recommended return for IV abx, states he will pull up results in portal and discuss with physician now - Maris Joe PA-C

## 2023-08-17 NOTE — ED ADULT TRIAGE NOTE - RELATIONSHIP TO PATIENT
Patient arrived to T-[id number]. Able to stand from gurney and pivot to bed. Tele box applied and post op vitals resumed. Call light in reach. Patient instructed to call for any needs and before getting up to bathroom. Bed alarm engaged.    bel

## 2023-08-31 ENCOUNTER — APPOINTMENT (OUTPATIENT)
Dept: OTOLARYNGOLOGY | Facility: CLINIC | Age: 78
End: 2023-08-31

## 2023-09-05 ENCOUNTER — APPOINTMENT (OUTPATIENT)
Dept: OTOLARYNGOLOGY | Facility: CLINIC | Age: 78
End: 2023-09-05

## 2024-02-05 ENCOUNTER — INPATIENT (INPATIENT)
Facility: HOSPITAL | Age: 79
LOS: 6 days | Discharge: HOME | DRG: 871 | End: 2024-02-12
Attending: STUDENT IN AN ORGANIZED HEALTH CARE EDUCATION/TRAINING PROGRAM | Admitting: SPECIALIST
Payer: MEDICARE

## 2024-02-05 VITALS
WEIGHT: 160.06 LBS | OXYGEN SATURATION: 97 % | RESPIRATION RATE: 16 BRPM | HEART RATE: 93 BPM | TEMPERATURE: 100 F | HEIGHT: 67 IN | DIASTOLIC BLOOD PRESSURE: 64 MMHG | SYSTOLIC BLOOD PRESSURE: 128 MMHG

## 2024-02-05 DIAGNOSIS — Z90.49 ACQUIRED ABSENCE OF OTHER SPECIFIED PARTS OF DIGESTIVE TRACT: Chronic | ICD-10-CM

## 2024-02-05 DIAGNOSIS — Z41.9 ENCOUNTER FOR PROCEDURE FOR PURPOSES OTHER THAN REMEDYING HEALTH STATE, UNSPECIFIED: Chronic | ICD-10-CM

## 2024-02-05 DIAGNOSIS — R39.9 UNSPECIFIED SYMPTOMS AND SIGNS INVOLVING THE GENITOURINARY SYSTEM: ICD-10-CM

## 2024-02-05 DIAGNOSIS — Z98.890 OTHER SPECIFIED POSTPROCEDURAL STATES: Chronic | ICD-10-CM

## 2024-02-05 LAB
ALBUMIN SERPL ELPH-MCNC: 3.1 G/DL — LOW (ref 3.3–5)
ALBUMIN SERPL ELPH-MCNC: 3.5 G/DL — SIGNIFICANT CHANGE UP (ref 3.3–5)
ALP SERPL-CCNC: 51 U/L — SIGNIFICANT CHANGE UP (ref 40–120)
ALP SERPL-CCNC: 52 U/L — SIGNIFICANT CHANGE UP (ref 40–120)
ALT FLD-CCNC: 26 U/L — SIGNIFICANT CHANGE UP (ref 10–45)
ALT FLD-CCNC: 34 U/L — SIGNIFICANT CHANGE UP (ref 10–45)
AMORPH CRY # UR COMP ASSIST: PRESENT
AMORPH SED URNS QL MICRO: PRESENT
ANION GAP SERPL CALC-SCNC: 14 MMOL/L — SIGNIFICANT CHANGE UP (ref 5–17)
ANION GAP SERPL CALC-SCNC: 15 MMOL/L — SIGNIFICANT CHANGE UP (ref 5–17)
APPEARANCE UR: ABNORMAL
APTT BLD: 31 SEC — SIGNIFICANT CHANGE UP (ref 24.5–35.6)
AST SERPL-CCNC: 14 U/L — SIGNIFICANT CHANGE UP (ref 10–40)
AST SERPL-CCNC: 17 U/L — SIGNIFICANT CHANGE UP (ref 10–40)
BACTERIA # UR AUTO: ABNORMAL /HPF
BASE EXCESS BLDV CALC-SCNC: -6.3 MMOL/L — LOW (ref -2–3)
BASOPHILS # BLD AUTO: 0.03 K/UL — SIGNIFICANT CHANGE UP (ref 0–0.2)
BASOPHILS NFR BLD AUTO: 0.2 % — SIGNIFICANT CHANGE UP (ref 0–2)
BILIRUB SERPL-MCNC: 0.2 MG/DL — SIGNIFICANT CHANGE UP (ref 0.2–1.2)
BILIRUB SERPL-MCNC: 0.3 MG/DL — SIGNIFICANT CHANGE UP (ref 0.2–1.2)
BILIRUB UR-MCNC: NEGATIVE — SIGNIFICANT CHANGE UP
BLD GP AB SCN SERPL QL: NEGATIVE — SIGNIFICANT CHANGE UP
BUN SERPL-MCNC: 101 MG/DL — HIGH (ref 7–23)
BUN SERPL-MCNC: 108 MG/DL — HIGH (ref 7–23)
CA-I SERPL-SCNC: 1.18 MMOL/L — SIGNIFICANT CHANGE UP (ref 1.15–1.33)
CALCIUM SERPL-MCNC: 7.8 MG/DL — LOW (ref 8.4–10.5)
CALCIUM SERPL-MCNC: 8.6 MG/DL — SIGNIFICANT CHANGE UP (ref 8.4–10.5)
CAST: 12 /LPF — HIGH (ref 0–4)
CHLORIDE BLDV-SCNC: 93 MMOL/L — LOW (ref 96–108)
CHLORIDE SERPL-SCNC: 92 MMOL/L — LOW (ref 96–108)
CHLORIDE SERPL-SCNC: 99 MMOL/L — SIGNIFICANT CHANGE UP (ref 96–108)
CO2 BLDV-SCNC: 20 MMOL/L — LOW (ref 22–26)
CO2 SERPL-SCNC: 16 MMOL/L — LOW (ref 22–31)
CO2 SERPL-SCNC: 17 MMOL/L — LOW (ref 22–31)
COLOR SPEC: YELLOW — SIGNIFICANT CHANGE UP
CREAT SERPL-MCNC: 2.59 MG/DL — HIGH (ref 0.5–1.3)
CREAT SERPL-MCNC: 3.08 MG/DL — HIGH (ref 0.5–1.3)
DIFF PNL FLD: ABNORMAL
EGFR: 20 ML/MIN/1.73M2 — LOW
EGFR: 25 ML/MIN/1.73M2 — LOW
EOSINOPHIL # BLD AUTO: 0 K/UL — SIGNIFICANT CHANGE UP (ref 0–0.5)
EOSINOPHIL NFR BLD AUTO: 0 % — SIGNIFICANT CHANGE UP (ref 0–6)
FLUAV AG NPH QL: SIGNIFICANT CHANGE UP
FLUBV AG NPH QL: SIGNIFICANT CHANGE UP
GAS PNL BLDV: 123 MMOL/L — LOW (ref 136–145)
GAS PNL BLDV: SIGNIFICANT CHANGE UP
GAS PNL BLDV: SIGNIFICANT CHANGE UP
GLUCOSE BLDV-MCNC: 359 MG/DL — HIGH (ref 70–99)
GLUCOSE SERPL-MCNC: 212 MG/DL — HIGH (ref 70–99)
GLUCOSE SERPL-MCNC: 345 MG/DL — HIGH (ref 70–99)
GLUCOSE UR QL: NEGATIVE MG/DL — SIGNIFICANT CHANGE UP
HCO3 BLDV-SCNC: 19 MMOL/L — LOW (ref 22–29)
HCT VFR BLD CALC: 28.9 % — LOW (ref 39–50)
HCT VFR BLDA CALC: 29 % — LOW (ref 39–51)
HGB BLD CALC-MCNC: 9.7 G/DL — LOW (ref 12.6–17.4)
HGB BLD-MCNC: 9.8 G/DL — LOW (ref 13–17)
IMM GRANULOCYTES NFR BLD AUTO: 0.5 % — SIGNIFICANT CHANGE UP (ref 0–0.9)
INR BLD: 1.54 RATIO — HIGH (ref 0.85–1.18)
KETONES UR-MCNC: NEGATIVE MG/DL — SIGNIFICANT CHANGE UP
LACTATE BLDV-MCNC: 2.3 MMOL/L — HIGH (ref 0.5–2)
LEUKOCYTE ESTERASE UR-ACNC: ABNORMAL
LYMPHOCYTES # BLD AUTO: 0.86 K/UL — LOW (ref 1–3.3)
LYMPHOCYTES # BLD AUTO: 5.1 % — LOW (ref 13–44)
MCHC RBC-ENTMCNC: 31.2 PG — SIGNIFICANT CHANGE UP (ref 27–34)
MCHC RBC-ENTMCNC: 33.9 GM/DL — SIGNIFICANT CHANGE UP (ref 32–36)
MCV RBC AUTO: 92 FL — SIGNIFICANT CHANGE UP (ref 80–100)
MONOCYTES # BLD AUTO: 1.21 K/UL — HIGH (ref 0–0.9)
MONOCYTES NFR BLD AUTO: 7.2 % — SIGNIFICANT CHANGE UP (ref 2–14)
NEUTROPHILS # BLD AUTO: 14.58 K/UL — HIGH (ref 1.8–7.4)
NEUTROPHILS NFR BLD AUTO: 87 % — HIGH (ref 43–77)
NITRITE UR-MCNC: NEGATIVE — SIGNIFICANT CHANGE UP
NRBC # BLD: 0 /100 WBCS — SIGNIFICANT CHANGE UP (ref 0–0)
OB PNL STL: NEGATIVE — SIGNIFICANT CHANGE UP
OSMOLALITY SERPL: 310 MOSMOL/KG — HIGH (ref 280–301)
PCO2 BLDV: 36 MMHG — LOW (ref 42–55)
PH BLDV: 7.33 — SIGNIFICANT CHANGE UP (ref 7.32–7.43)
PH UR: 6 — SIGNIFICANT CHANGE UP (ref 5–8)
PLATELET # BLD AUTO: 315 K/UL — SIGNIFICANT CHANGE UP (ref 150–400)
PO2 BLDV: 26 MMHG — SIGNIFICANT CHANGE UP (ref 25–45)
POTASSIUM BLDV-SCNC: 5.4 MMOL/L — HIGH (ref 3.5–5.1)
POTASSIUM SERPL-MCNC: 4.5 MMOL/L — SIGNIFICANT CHANGE UP (ref 3.5–5.3)
POTASSIUM SERPL-MCNC: 5.3 MMOL/L — SIGNIFICANT CHANGE UP (ref 3.5–5.3)
POTASSIUM SERPL-SCNC: 4.5 MMOL/L — SIGNIFICANT CHANGE UP (ref 3.5–5.3)
POTASSIUM SERPL-SCNC: 5.3 MMOL/L — SIGNIFICANT CHANGE UP (ref 3.5–5.3)
PROT SERPL-MCNC: 6.6 G/DL — SIGNIFICANT CHANGE UP (ref 6–8.3)
PROT SERPL-MCNC: 7.2 G/DL — SIGNIFICANT CHANGE UP (ref 6–8.3)
PROT UR-MCNC: 100 MG/DL
PROTHROM AB SERPL-ACNC: 16.7 SEC — HIGH (ref 9.5–13)
RBC # BLD: 3.14 M/UL — LOW (ref 4.2–5.8)
RBC # FLD: 13.3 % — SIGNIFICANT CHANGE UP (ref 10.3–14.5)
RBC CASTS # UR COMP ASSIST: 2 /HPF — SIGNIFICANT CHANGE UP (ref 0–4)
REVIEW: SIGNIFICANT CHANGE UP
RH IG SCN BLD-IMP: POSITIVE — SIGNIFICANT CHANGE UP
RSV RNA NPH QL NAA+NON-PROBE: SIGNIFICANT CHANGE UP
SAO2 % BLDV: 46.2 % — LOW (ref 67–88)
SARS-COV-2 RNA SPEC QL NAA+PROBE: SIGNIFICANT CHANGE UP
SODIUM SERPL-SCNC: 124 MMOL/L — LOW (ref 135–145)
SODIUM SERPL-SCNC: 129 MMOL/L — LOW (ref 135–145)
SP GR SPEC: 1.01 — SIGNIFICANT CHANGE UP (ref 1–1.03)
SQUAMOUS # UR AUTO: 6 /HPF — HIGH (ref 0–5)
UROBILINOGEN FLD QL: 0.2 MG/DL — SIGNIFICANT CHANGE UP (ref 0.2–1)
WBC # BLD: 16.77 K/UL — HIGH (ref 3.8–10.5)
WBC # FLD AUTO: 16.77 K/UL — HIGH (ref 3.8–10.5)
WBC CLUMPS # UR AUTO: PRESENT
WBC UR QL: >998 /HPF — HIGH (ref 0–5)

## 2024-02-05 PROCEDURE — 76604 US EXAM CHEST: CPT | Mod: 26,GC

## 2024-02-05 PROCEDURE — 99291 CRITICAL CARE FIRST HOUR: CPT | Mod: 25

## 2024-02-05 PROCEDURE — 93308 TTE F-UP OR LMTD: CPT | Mod: 26,GC

## 2024-02-05 PROCEDURE — 99285 EMERGENCY DEPT VISIT HI MDM: CPT | Mod: GC

## 2024-02-05 RX ORDER — SODIUM CHLORIDE 9 MG/ML
500 INJECTION INTRAMUSCULAR; INTRAVENOUS; SUBCUTANEOUS ONCE
Refills: 0 | Status: COMPLETED | OUTPATIENT
Start: 2024-02-05 | End: 2024-02-05

## 2024-02-05 RX ORDER — ACETAMINOPHEN 500 MG
1000 TABLET ORAL ONCE
Refills: 0 | Status: DISCONTINUED | OUTPATIENT
Start: 2024-02-05 | End: 2024-02-05

## 2024-02-05 RX ORDER — FINASTERIDE 5 MG/1
1 TABLET, FILM COATED ORAL
Qty: 0 | Refills: 0 | DISCHARGE

## 2024-02-05 RX ORDER — CHLORHEXIDINE GLUCONATE 213 G/1000ML
1 SOLUTION TOPICAL
Refills: 0 | Status: DISCONTINUED | OUTPATIENT
Start: 2024-02-05 | End: 2024-02-06

## 2024-02-05 RX ORDER — PIPERACILLIN AND TAZOBACTAM 4; .5 G/20ML; G/20ML
3.38 INJECTION, POWDER, LYOPHILIZED, FOR SOLUTION INTRAVENOUS EVERY 8 HOURS
Refills: 0 | Status: DISCONTINUED | OUTPATIENT
Start: 2024-02-06 | End: 2024-02-08

## 2024-02-05 RX ORDER — PIPERACILLIN AND TAZOBACTAM 4; .5 G/20ML; G/20ML
3.38 INJECTION, POWDER, LYOPHILIZED, FOR SOLUTION INTRAVENOUS ONCE
Refills: 0 | Status: COMPLETED | OUTPATIENT
Start: 2024-02-05 | End: 2024-02-05

## 2024-02-05 RX ORDER — CITALOPRAM 10 MG/1
20 TABLET, FILM COATED ORAL DAILY
Refills: 0 | Status: DISCONTINUED | OUTPATIENT
Start: 2024-02-05 | End: 2024-02-07

## 2024-02-05 RX ORDER — SODIUM CHLORIDE 9 MG/ML
1000 INJECTION INTRAMUSCULAR; INTRAVENOUS; SUBCUTANEOUS ONCE
Refills: 0 | Status: COMPLETED | OUTPATIENT
Start: 2024-02-05 | End: 2024-02-05

## 2024-02-05 RX ORDER — NOREPINEPHRINE BITARTRATE/D5W 8 MG/250ML
0.05 PLASTIC BAG, INJECTION (ML) INTRAVENOUS
Qty: 8 | Refills: 0 | Status: DISCONTINUED | OUTPATIENT
Start: 2024-02-05 | End: 2024-02-06

## 2024-02-05 RX ORDER — SODIUM CHLORIDE 9 MG/ML
1000 INJECTION, SOLUTION INTRAVENOUS ONCE
Refills: 0 | Status: COMPLETED | OUTPATIENT
Start: 2024-02-05 | End: 2024-02-05

## 2024-02-05 RX ORDER — CEFTRIAXONE 500 MG/1
2000 INJECTION, POWDER, FOR SOLUTION INTRAMUSCULAR; INTRAVENOUS ONCE
Refills: 0 | Status: COMPLETED | OUTPATIENT
Start: 2024-02-05 | End: 2024-02-05

## 2024-02-05 RX ORDER — PIPERACILLIN AND TAZOBACTAM 4; .5 G/20ML; G/20ML
3.38 INJECTION, POWDER, LYOPHILIZED, FOR SOLUTION INTRAVENOUS ONCE
Refills: 0 | Status: COMPLETED | OUTPATIENT
Start: 2024-02-06 | End: 2024-02-06

## 2024-02-05 RX ORDER — HEPARIN SODIUM 5000 [USP'U]/ML
5000 INJECTION INTRAVENOUS; SUBCUTANEOUS EVERY 8 HOURS
Refills: 0 | Status: DISCONTINUED | OUTPATIENT
Start: 2024-02-05 | End: 2024-02-12

## 2024-02-05 RX ORDER — ACETAMINOPHEN 500 MG
975 TABLET ORAL ONCE
Refills: 0 | Status: COMPLETED | OUTPATIENT
Start: 2024-02-05 | End: 2024-02-05

## 2024-02-05 RX ORDER — KETOROLAC TROMETHAMINE 30 MG/ML
15 SYRINGE (ML) INJECTION ONCE
Refills: 0 | Status: DISCONTINUED | OUTPATIENT
Start: 2024-02-05 | End: 2024-02-05

## 2024-02-05 RX ADMIN — SODIUM CHLORIDE 500 MILLILITER(S): 9 INJECTION INTRAMUSCULAR; INTRAVENOUS; SUBCUTANEOUS at 22:06

## 2024-02-05 RX ADMIN — Medication 975 MILLIGRAM(S): at 15:10

## 2024-02-05 RX ADMIN — CEFTRIAXONE 100 MILLIGRAM(S): 500 INJECTION, POWDER, FOR SOLUTION INTRAMUSCULAR; INTRAVENOUS at 18:12

## 2024-02-05 RX ADMIN — Medication 15 MILLIGRAM(S): at 19:00

## 2024-02-05 RX ADMIN — Medication 6.81 MICROGRAM(S)/KG/MIN: at 18:47

## 2024-02-05 RX ADMIN — SODIUM CHLORIDE 1000 MILLILITER(S): 9 INJECTION INTRAMUSCULAR; INTRAVENOUS; SUBCUTANEOUS at 17:00

## 2024-02-05 RX ADMIN — SODIUM CHLORIDE 1000 MILLILITER(S): 9 INJECTION INTRAMUSCULAR; INTRAVENOUS; SUBCUTANEOUS at 15:10

## 2024-02-05 RX ADMIN — SODIUM CHLORIDE 1000 MILLILITER(S): 9 INJECTION, SOLUTION INTRAVENOUS at 18:59

## 2024-02-05 NOTE — ED ADULT NURSE NOTE - NSFALLRISKINTERV_ED_ALL_ED
Assistance OOB with selected safe patient handling equipment if applicable/Assistance with ambulation/Communicate fall risk and risk factors to all staff, patient, and family/Provide visual cue: yellow wristband, yellow gown, etc/Reinforce activity limits and safety measures with patient and family/Use of alarms - bed, stretcher, chair and/or video monitoring/Call bell, personal items and telephone in reach/Instruct patient to call for assistance before getting out of bed/chair/stretcher/Non-slip footwear applied when patient is off stretcher/Draper to call system/Physically safe environment - no spills, clutter or unnecessary equipment/Purposeful Proactive Rounding/Room/bathroom lighting operational, light cord in reach

## 2024-02-05 NOTE — H&P ADULT - NSHPLABSRESULTS_GEN_ALL_CORE
Labs                            9.8    16.77 )-----------( 315      ( 05 Feb 2024 15:21 )             28.9         129<L>  |  99  |  101<H>  ----------------------------<  212<H>  4.5   |  16<L>  |  2.59<H>    Ca    7.8<L>      05 Feb 2024 22:09    TPro  6.6  /  Alb  3.1<L>  /  TBili  0.2  /  DBili  x   /  AST  14  /  ALT  26  /  AlkPhos  51  02-05              Urinalysis Basic - ( 05 Feb 2024 22:09 )    Color: x / Appearance: x / SG: x / pH: x  Gluc: 212 mg/dL / Ketone: x  / Bili: x / Urobili: x   Blood: x / Protein: x / Nitrite: x   Leuk Esterase: x / RBC: x / WBC x   Sq Epi: x / Non Sq Epi: x / Bacteria: x        PT/INR - ( 05 Feb 2024 15:21 )   PT: 16.7 sec;   INR: 1.54 ratio         PTT - ( 05 Feb 2024 15:21 )  PTT:31.0 sec    Lactate Trend  02-05 @ 18:23 Lactate:1.0

## 2024-02-05 NOTE — H&P ADULT - NSHPPHYSICALEXAM_GEN_ALL_CORE
Gen: NAD, non-toxic appearing  Head: normal appearing  HEENT: normal conjunctiva, oral mucosa moist  Lung: no respiratory distress, speaking in full sentences, CTA b/l     CV: regular rate and rhythm, no murmurs  Abd: soft, non distended, non tender   MSK: no visible deformities  Neuro: No focal deficits, AAOx3  Skin: Warm  Psych: normal affect Gen: NAD, non-toxic appearing  Head: normal appearing  HEENT: normal conjunctiva, oral mucosa moist  Lung: no respiratory distress, speaking in full sentences, CTA b/l     CV: regular rate and rhythm, no murmurs  Abd: soft, non distended, non tender   MSK: no visible deformities  Neuro: No focal deficits, AAOx3  Skin: Warm and dry   Psych: normal affect

## 2024-02-05 NOTE — ED PROVIDER NOTE - PRO INTERPRETER NEED 2
"Subjective:       Patient ID: Abimael Mendoza III is a 66 y.o. male.    Vitals:  height is 5' 8" (1.727 m) and weight is 76.2 kg (168 lb). His temperature is 98.8 °F (37.1 °C). His blood pressure is 168/95 (abnormal) and his pulse is 80. His respiration is 16 and oxygen saturation is 98%.     Chief Complaint: Cough    Patient presents to urgent care today for cough and congestion that started yesterday. Patient tested positive for COVID-19 at [facility] today. Patient has been taking Tessalon Perles RX at home. Patient currently denies fever, chills, body aches, active CP, SOB, wheezing, abdominal pain, N/V, headache, blurry vision, dizziness or syncope.     Other  This is a new problem. The current episode started yesterday. The problem occurs constantly. The problem has been gradually worsening. Associated symptoms include congestion and coughing. Pertinent negatives include no abdominal pain, anorexia, arthralgias, change in bowel habit, chest pain, chills, diaphoresis, fatigue, fever, headaches, joint swelling, myalgias, nausea, neck pain, numbness, rash, sore throat, swollen glands, urinary symptoms, vertigo, visual change, vomiting or weakness. Nothing aggravates the symptoms. Treatments tried: Tessalon Perles. The treatment provided mild relief.       Constitution: Negative for chills, sweating, fatigue and fever.   HENT: Positive for congestion, postnasal drip and sinus pressure. Negative for ear pain, drooling, nosebleeds, foreign body in nose, sinus pain, sore throat, trouble swallowing and voice change.    Neck: Negative for neck pain, neck stiffness, painful lymph nodes and neck swelling.   Cardiovascular: Negative for chest pain, leg swelling, palpitations, sob on exertion and passing out.   Eyes: Negative for eye discharge, eye itching, eye pain, eye redness and eyelid swelling.   Respiratory: Positive for cough. Negative for chest tightness, sputum production, bloody sputum, shortness of breath, " stridor and wheezing.    Gastrointestinal: Negative for abdominal pain, abdominal bloating, nausea, vomiting, constipation, diarrhea and heartburn.   Genitourinary: Negative for urine decreased.   Musculoskeletal: Negative for joint pain, joint swelling, abnormal ROM of joint, pain with walking, muscle cramps and muscle ache.   Skin: Negative for rash and hives.   Allergic/Immunologic: Negative for hives, itching and sneezing.   Neurological: Negative for dizziness, history of vertigo, light-headedness, passing out, loss of balance, headaches, altered mental status, loss of consciousness, numbness and seizures.   Hematologic/Lymphatic: Negative for swollen lymph nodes.   Psychiatric/Behavioral: Negative for altered mental status and nervous/anxious. The patient is not nervous/anxious.        Objective:      Physical Exam   Constitutional: He is oriented to person, place, and time. He appears well-developed. He is cooperative.  Non-toxic appearance. He does not appear ill. No distress.   HENT:   Head: Normocephalic and atraumatic.   Ears:   Right Ear: Hearing, tympanic membrane, external ear and ear canal normal.   Left Ear: Hearing, tympanic membrane, external ear and ear canal normal.   Nose: Mucosal edema and rhinorrhea present. No nasal deformity. No epistaxis. Right sinus exhibits no maxillary sinus tenderness and no frontal sinus tenderness. Left sinus exhibits no maxillary sinus tenderness and no frontal sinus tenderness.   Mouth/Throat: Uvula is midline and mucous membranes are normal. No trismus in the jaw. Normal dentition. No uvula swelling. Posterior oropharyngeal erythema and cobblestoning present. No oropharyngeal exudate, posterior oropharyngeal edema or tonsillar abscesses. No tonsillar exudate.   Eyes: Conjunctivae and lids are normal. No scleral icterus.   Neck: Trachea normal and phonation normal. Neck supple. No edema present. No erythema present. No neck rigidity present.   Cardiovascular: Normal  rate, regular rhythm, normal heart sounds and normal pulses.   Pulmonary/Chest: Effort normal and breath sounds normal. No accessory muscle usage or stridor. No respiratory distress. He has no decreased breath sounds. He has no wheezes. He has no rhonchi. He has no rales.   Abdominal: Normal appearance.   Musculoskeletal: Normal range of motion.         General: No deformity. Normal range of motion.   Lymphadenopathy:     He has no cervical adenopathy.   Neurological: He is alert and oriented to person, place, and time. He has normal sensation. He exhibits normal muscle tone. Gait normal. Coordination normal.   Skin: Skin is warm, dry, intact, not diaphoretic, not pale and no rash. Capillary refill takes less than 2 seconds.   Psychiatric: His speech is normal and behavior is normal. Judgment and thought content normal.   Nursing note and vitals reviewed.    Positive COVID-19 today at [facility]        Assessment:       1. COVID-19    2. Cough          Plan:     Discussed COVID-19 results with patient. Discussed with patient that he does not qualify for EUA infusion at this time (risk score = 2). CDC precautions given to patient. Advised patient to follow-up with PCP for further evaluation as needed. Strict ER precautions given as well. Patient aware, verbalized understanding and agreed with patient's plan of care.    COVID-19    Cough  -     promethazine-dextromethorphan (PROMETHAZINE-DM) 6.25-15 mg/5 mL Syrp; Take 5 mLs by mouth every 6 (six) hours as needed (for nighttime cough). Will cause drowsiness.  Dispense: 118 mL; Refill: 0      Patient Instructions   You must understand that you've received an Urgent Care treatment only and that you may be released before all your medical problems are known or treated. You, the patient, will arrange for follow up care as instructed.  Follow up with your PCP or specialty clinic as directed if not improved or as needed. You can call [phone] to schedule an appointment  with the appropriate provider.  If your condition worsens we recommend that you receive another evaluation at the Emergency Department for any concerns or worsening of condition.  Patient aware and verbalized understanding.    Reviewed COVID-19 results with patient.  Counseled patient and answered questions in regards to COVID-19 testing.  Advised patient to go home, treat symptoms with over-the-counter (OTC) medications and avoid contact with others at this time.  Increase fluids and rest is important.  OTC Claritin or Zyrtec or Allegra (plain) daily as discussed.  OTC Flonase Nasal Spray (plain) for nasal congestion/seasonal allergies.  Tessalon Perles RX as prescribed for daytime cough.  Cough Syrup RX as prescribed for nighttime cough - will make you drowsy.  Info given for virtual visit, covid 19 information line, state info line.   Advised patient to follow-up with PCP and/or Specialist for further evaluation as needed.   Strict ER precautions given to patient.  Follow local/state guidelines per covid emergency.   Patient aware, verbalized understanding and agreed with plan of care.    CDC RECOMMENDATIONS  --IF test results are NEGATIVE and NO known high risk exposure to covid-19 virus, you can be excluded from work/school until:  o MINIMUM OF 24 hours fever-free without the use of fever-reducing medications AND  o Improvement in symptoms (e.g. cough, shortness of breath, fatigue, GI symptoms, etc)     --IF YOU ARE BEING TESTED BECAUSE OF A HIGH RISK EXPOSURE, which the CDC defines as direct contact 6 feet or less for >15 minutes with a known positive person, you should follow CDC guidelines as well as your employer/school protocols for safely returning to work/school.   *Please be aware that there are False Negative possibilities with testing, so you should return to work/school based upon CDC guidelines, not simply a negative result, unless your employer/school has a different RTW protocol/guidance for you.      --IF test results are POSITIVE , you should be excluded from work/school until:  o At least 24 hours FEVER-FREE without the use of fever-reducing medications AND  o Improvement in symptoms (e.g., cough, shortness of breathing, fatigue, GI symptoms, etc) AND  o At least 5 days have passed since symptoms first appeared.    IF NOT IMPROVING, FOLLOW UP WITH VIRTUAL ONLINE VISIT WITH A PROVIDER 24/7 - FOR MORE INFORMATION OR TO DOWNLOAD THE ANGELA, VISIT [facility] ANYWHERE CARE AT [email or web address]  FOR 24/7 NURSE ADVICE, CALL [phone]  FOR COVID 19 RELATED QUESTIONS, CALL the [facility] covid hotline: [phone]  [place] FOR UP TO DATE INFORMATION: Text or dial 211, test keyword [id number] OR DIAL 211    HELPFUL EXTERNAL RESOURCES:  OFFICE OF PUBLIC HEALTH: [place] - http://ldh.la.gov/ and 1-919.985.8589  [facility] - https://www.cdc.gov/   WORLD HEALTH ORGANIZATION (WHO) - https://www.who.int/   CDC WHEN TO QUARANTINE - https://www.cdc.gov/coronavirus/2019-ncov/if-you-are-sick/quarantine.html     INFO ABOUT [facility] COVID-19 RAPID TESTING:  This test utilizes isothermal nucleic acid amplification technology to detect the SARS-CoV-2 RdRp nucleic acid segment.   The analytical sensitivity (limit of detection) is 125 genome equivalents/mL.   A POSITIVE result implies infection with the SARS-CoV-2 virus; the patient is presumed to be contagious.     A NEGATIVE result means that SARS-CoV-2 nucleic acids are not present above the limit of detection.   A NEGATIVE result should be treated as presumptive. It does not rule out the possibility of COVID-19 and should not be the sole basis for treatment decisions.   This test is only for use under the Food and Drug Administration s Emergency Use Authorization (EUA).   Commercial kits are provided by [employer]. Performance characteristics of the EUA have been independently verified by [facility] Department of Pathology and  [facility].   _________________________________________________________________   The authorized Fact Sheet for Healthcare Providers and the authorized Fact Sheet for Patients of the ID NOW COVID-19 are available on the FDA website:   https://www.fda.gov/media/091863/download  https://www.fda.gov/media/937924/download

## 2024-02-05 NOTE — ED PROVIDER NOTE - ATTENDING CONTRIBUTION TO CARE
Attending MD Francisco:  I performed a history and physical exam of the patient and discussed their management with the resident. I reviewed the resident's note and agree with the documented findings and plan of care. My medical decision making and observations are found above.

## 2024-02-05 NOTE — ED PROVIDER NOTE - PHYSICAL EXAMINATION
GENERAL: Awake, alert, NAD  HEENT: NC/AT, moist mucous membranes, EOMI  LUNGS: CTAB, no wheezes or crackles   CARDIAC: RRR, no m/r/g  ABDOMEN: Soft, non tender, non distended, no rebound, no guarding  EXT: No edema, no calf tenderness, no deformities.  NEURO: A&Ox2. Moving all extremities.

## 2024-02-05 NOTE — H&P ADULT - ASSESSMENT
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     Neuro  - No active issues. A&Ox3     Pulm  - No active issues. On room air     CV  # Septic shock   - s/p 3.5L, remains hypotensive despite adequate resuscitation  - On levophed, titrate to MAP > 65  - Hold home anti-HTN    Renal   # Hyponatremia   - Na on presentation 124, most likely hypovolemic hyponatremia s/p 3L IVF   - Repeat BMP and check urine lytes     # CKD   - Unknown baseline. Last Cr in system in 05/2023 was 2.29. On presentation, Cr 3.08  - Monitor I & O       GI   - No active issues     Infectious disease   # Complicated UTI   - History of neobladder and prior ucx with pseudomonas resistant to fluroquinolones   - Outpt Ucx E. coli, unknown sensitivities   - Given severity of presentation and risk factors, escalate to zosyn   - Follow up Ucx, Bcx      Endo   # T2DM  - Check A1c  - Low dose ISS and adjust to keep glucose <180     Heme   - DVT ppx: SQH

## 2024-02-05 NOTE — ED PROVIDER NOTE - PROGRESS NOTE DETAILS
Bárbara Lugo PGY2: I received sign out on this patient. I have reassessed patient and agree with the current plan.after turning of levo (.03) pt MAP>70. given 3L and ceftriaxone. bedside US did not show hydro on either side. pt very well appearing, will admit to medicine. sG IVC collapsable but b lines predominant anteriorly will give 500 NS slow.     consulted MICU given requirement of low dose pressors.

## 2024-02-05 NOTE — H&P ADULT - ATTENDING COMMENTS
78M Hx HTN, T2DM, CDPD, Lung CA, BPH s/p TURBT/TURP, Bladder CA Resection and Neobladder, E.Coli UTI p/w ED Fever, Confusion, PPOI and Hypotension required 3.5 Li IV Bolus in ED without effect and started on IV Pressor.   - Febrile Sepsis and Shock likely Urosepsis    - A&O x 3 and FC x 4 with family at bedside   - RaO2 SpO2 98% but CXR suspicious Interstitial Process   - IV Pressor titration to MAP >65 mmHg   - Bedside POCUS   - Continue IV Fluid and wean off IV Pressor   - Empiric ABx Coverage pending C&S   - Hyponatremic Hypovolemia 124 with ARF/CKD   - Serial CBC, CMPs, PT/INR, Lactate, TTE   - DVT PPx - SQH   - GOC - Full Code     Patient seen and examined with ICU Resident/Fellow at bedside after lab data, medical records and radiology reports reviewed. I have read and agreeable in general with resident's Documented Note, Assessment and Management Plan which reflected my opinions from bedside round and discussion.   Total Critical Care Time = 45 Min excluding teaching and procedure activity.

## 2024-02-05 NOTE — ED ADULT NURSE REASSESSMENT NOTE - NS ED NURSE REASSESS COMMENT FT1
Received report from BRITTANY Martinez RN. Patient presenting to the ED for AMS, UTI noted on UA. Patient hypotensive, currently on levophed, 0.05 mcg/kg/min. Patient is primarily [place] speaking, family at bedside. Patient without complaints of pain at this time. Pt placed in position of comfort. Pt educated on call bell system and provided call bell. Bed in lowest position, wheels locked, appropriate side rails raised. Pt denies needs at this time.

## 2024-02-05 NOTE — ED ADULT NURSE NOTE - OBJECTIVE STATEMENT
79 y/o M with PMH of bladder CA and neobladder presents ot the ED with complaints of AMS and UTI. Per family, pt had a fever and had his urine tested yesterday, and diagnosed with UTI. Pt A/OX1 gross neuro intact, lungs cta bilaterally, no difficulty speaking in complete sentences, s1s2 heart sounds heard, pulses x 4, cano x4, abdomen soft nontender nondistended, skin intact. iv 20g placed in LAC. Pt on cardiac monitor, reading nsr

## 2024-02-05 NOTE — H&P ADULT - NSICDXPASTMEDICALHX_GEN_ALL_CORE_FT
PAST MEDICAL HISTORY:  Bladder cancer     Diabetes mellitus     Diabetes mellitus, type 2     GI bleeding     History of COPD     HLD (hyperlipidemia)     Hypertension     Lung cancer     Malignant neoplasm of bladder     SOB (shortness of breath)      PAST MEDICAL HISTORY:  Bladder cancer     Diabetes mellitus, type 2     History of COPD     HLD (hyperlipidemia)     Hypertension     Lung cancer     Malignant neoplasm of bladder

## 2024-02-05 NOTE — ED PROVIDER NOTE - RAPID ASSESSMENT
Rashel Mary DO: This patient was seen and orders were placed in the waiting room as per our department's QDoc model.  Patient was to be sent to main ED for full medical evaluation and receiving team was to follow up on any labs, analgesia, clinical imaging ordered.  Any reassessment and disposition decisions were to be made by receiving team as clinically indicated, all decisions regarding the progression of care to be made at their discretion.  I did not perform a comprehensive history and physical on this patient.     78m hx bladder ca s/p neobladder,lung cancer, htn,  had outpt ua + uc indicating UTI, arrives having urinated on himself, +fever, + confusion. Pt also having some cough.    on exam, pt has HR 90, oral temp 99.7, urine on pants, no abdominal tenderness

## 2024-02-05 NOTE — H&P ADULT - NSHPREVIEWOFSYSTEMS_GEN_ALL_CORE
Constitutional:  (-) fever, (-) chills, (+) lethargy  Eyes:  (-) eye pain (-) visual changes  ENMT: (-) nasal discharge, (-) sore throat. (-) neck pain or stiffness  Cardiac: (-) chest pain (-) palpitations  Respiratory:  (-) cough (-) respiratory distress  GI:  (-) nausea (-) vomiting (-) diarrhea (-) abdominal pain  :  (+) dysuria (+) frequency (+) burning  MS:  (-) back pain (-) joint pain  Neuro:  (-) headache (-) numbness (-) tingling (-) focal weakness  Skin:  (-) rash (-) petichiae   Except as documented in the HPI,  all other systems are negative

## 2024-02-05 NOTE — ED PROVIDER NOTE - CLINICAL SUMMARY MEDICAL DECISION MAKING FREE TEXT BOX
Attending MD Francisco. Agree with above.  Pt is a 79 yo hx of bladder ca s/p neobladder lung CA, HTN who had outpt UA + UC indicating UTI who arrives to ED with + urinary incontinence, + fever, + confusion.  Endorses mild abd'l TTP.  No focal abd'l TTP. Attending MD Francisco. Agree with above.  Pt is a 79 yo hx of bladder ca s/p neobladder lung CA, HTN who had outpt UA + UC indicating UTI who arrives to ED with + urinary incontinence, + fever, + confusion.  Endorses mild abd'l TTP.  No focal abd'l TTP.      Alexis, PGY3: 78-year-old male patient past medical history of bladder cancer status post neobladder, hypertension who presents to the emergency department for delirium in the setting of UTI.  Patient was seen outpatient and had a UA and urine culture collected which reflect an E. coli UTI.  Patient has been febrile at home and has been getting antipyretics.  Patient denies any nausea, vomiting, abdominal pain, URI symptoms.  However, family member endorses patient has been experiencing poor appetite and has been increasingly confused.  Patient aware that he is in the hospital but is not oriented to time.  On exam, patient with clear breath sounds, soft and nontender abdomen, and AAO x 2.  Will draw labs, urine, and will give fluids/antibiotics.  Will admit to the hospital.

## 2024-02-05 NOTE — ED ADULT NURSE NOTE - NS ED NURSE REPORT GIVEN TO FT
Marshal Chang Patient Age: 76 year old  MESSAGE: Interpreting service used: No    Insurance on file confirmed with caller: Yes    Patients wife Izabela (vof) calling with questions about a hip xray. She states they were advised that since it has been over a year since he last had an xray on the hip, he would need to have one done.  She is wanting to know when that would need to be done by, can he do it the morning of his appointment with  on 6/22 or does it need to be before this.  Order would also need to be placed for xray as there is not one currently.      Please advise. Routed to providers clinical pool.     Message read back to caller for accuracy: Yes       ALLERGIES:  Aleve  Current Outpatient Medications   Medication Sig Dispense Refill   • oxyCODONE, IMM REL, (ROXICODONE) 5 MG immediate release tablet Take 1 tablet by mouth every 6 hours as needed for Pain (severe pain).     • acetaminophen (TYLENOL) 650 MG CR tablet Take 1 tablet by mouth every 8 hours as needed for Pain (MILD TO MODERATE PAIN).     • ferrous sulfate 325 (65 FE) MG EC tablet Take 325 mg by mouth in the morning and 325 mg at noon and 325 mg in the evening. Take with meals.     • montelukast (SINGULAIR) 10 MG tablet TAKE 1 TABLET BY MOUTH EVERY NIGHT 90 tablet 1   • aspirin 325 MG tablet Take 1 tablet by mouth in the morning and 1 tablet in the evening. 84 tablet 0   • acetaminophen (TYLENOL) 650 MG CR tablet Take 1 tablet by mouth every 6 hours as needed for Pain. 45 tablet 0   • metoPROLOL succinate (TOPROL-XL) 50 MG 24 hr tablet Take 1 tablet by mouth daily. 90 tablet 1   • docusate sodium (COLACE) 100 MG capsule Take 1-3 tablets by mouth daily as needed for Constipation.     • Alpha Lipoic Acid 200 MG Cap Take 1 capsule by mouth 2 times daily. 650 mg twice daily     • TURMERIC PO Take 1 capsule by mouth daily.     • NON FORMULARY Take 1 capsule by mouth 2 times daily. PEAK GRANADO OIL     • NON FORMULARY Take 1 capsule by mouth 3  times daily. PEAK LIVER SUPPORT     • GINSENG EXTRACT PO Take 1 capsule by mouth daily.     • Cayenne 450 MG Cap Take 1 capsule by mouth daily.     • Cholecalciferol (vitamin D3) 125 mcg (5,000 units) capsule Take 1 capsule by mouth in the morning and 1 capsule in the evening.     • Vitamin E 180 mg (400 units) capsule Take 1 capsule by mouth daily.     • Misc Natural Products (PROSTATE SUPPORT PO) Take 1 capsule by mouth 2 times daily.     • NON FORMULARY Take 1 capsule by mouth 2 times daily. PEAK MAXIMUM ENDURANCE     • NON FORMULARY Take 1 capsule by mouth daily. RES V+     • zinc methionate 50 MG capsule Take 1 capsule by mouth daily.     • NON FORMULARY Take 1 capsule by mouth daily. CHOLINE-INOSITOL 102 mg/250 mg     • Coenzyme Q10 (Co Q 10) 100 MG Cap Take 1 capsule by mouth daily.     • Ventolin  (90 Base) MCG/ACT inhaler INHALE 2 PUFFS INTO THE LUNGS EVERY 4 HOURS AS NEEDED FOR SHORTNESS OF BREATH OR WHEEZING 18 g 1   • fluticasone propionate (Flovent HFA) 220 MCG/ACT inhaler Inhale 1 puff into the lungs 2 times daily. 12 g 12   • ascorbic acid (VITAMIN C) 1000 MG tablet Take 1 capsule by mouth 2 times daily. Do not start before November 26, 2022.     • B Complex Vitamins (VITAMIN B COMPLEX PO) Take 1 tablet by mouth daily. Do not start before November 26, 2022.     • CORAL CALCIUM PO Take 1,500 mg by mouth daily.     • magnesium 250 MG tablet Take 1 capsule by mouth daily. Do not start before November 26, 2022.     • CINNAMON PO Take 1 capsule by mouth 2 times daily. Do not start before November 26, 2022.     • Selenium 200 MCG Cap Take 1 capsule by mouth daily. Do not start before November 26, 2022.     • HAWTHORN BERRY PO Take 1 capsule by mouth 2 times daily. Do not start before November 26, 2022.     • Ginger, Zingiber officinalis, (GINGER ROOT PO) Take 1 capsule by mouth 2 times daily. Do not start before November 26, 2022.     • Ginkgo Biloba 40 MG Tab Take 1 capsule by mouth daily.     •  GARLIC PO Take 1 capsule by mouth 2 times daily. Do not start before November 26, 2022.     • NIACIN PO Take 1 capsule by mouth 2 times daily. Do not start before November 26, 2022.     • Lutein 20 MG Cap Take 1 capsule by mouth daily. Do not start before November 26, 2022.     • Red Yeast Rice 600 MG Tab Take 2 tabs in the am and 2 tabs in the pm       No current facility-administered medications for this visit.     PHARMACY to use:           Pharmacy preference(s) on file:   [id number] - [place], [place] - [facility] [address] AT [facility]  [address] [facility] [address]  Phone: [phone] Fax: [phone]      CALL BACK INFO: Ok to leave response (including medical information) on answering machine      PCP: Ryan Sarabia MD         INS: Payor: [employer] MEDICARE ADVANTAGE / Plan: [facility] MEDICARE ADVANTAGE FLEX PPO / Product Type: MEDADV   PATIENT ADDRESS:  [address] [place] [id number]     [facility] - MICU 19 window

## 2024-02-05 NOTE — H&P ADULT - HISTORY OF PRESENT ILLNESS
75yo male with PMH of bladder tumor s/p resection, TURBT/TURP, diabetes, HTN, BPH, Lung cancer presented for fever an dincreased confusion. Seen outpatient and found to have positive UA with cultures growing E. coli.  Notes febrile at home with associated symptoms of decreased p.o. intake and increasing confusion. Denies nausea, vomiting, abdominal pain, hematuria, dysuria, urinary urgency. In ED, febrile 99.7 and hypotensive.  Given 3L IVF without improvement requiring levo 0.04.  IVC collapsible with predominant B-lines anteriorly on POCUS given 500 NS with pressors.  Labs pertinent for WBC 16, , BUN/Cr 108/3.08, and  UA positive for leukocyte esterase and bacteria.  Started on ceftriaxone for urosepsis.  MICU consulted for further management of urosepsis on pressors. 79yo male with PMH of bladder tumor s/p resection, TURBT/TURP, diabetes, HTN, BPH, Lung cancer presented for fever  and onfusion. Seen outpatient and found to have positive UA with cultures growing E. coli.  Notes febrile at home with associated symptoms of decreased p.o. intake and increasing confusion. Denies nausea, vomiting, abdominal pain, hematuria, dysuria, urinary urgency. In ED, febrile 99.7 and hypotensive.  Given 3L IVF without improvement requiring levo 0.04.  IVC collapsible with predominant B-lines anteriorly on POCUS given 500 NS with pressors.  Labs pertinent for WBC 16, , BUN/Cr 108/3.08, and  UA positive for leukocyte esterase and bacteria.  Started on ceftriaxone for urosepsis.  MICU consulted for further management of urosepsis on pressors.

## 2024-02-06 LAB
ALBUMIN SERPL ELPH-MCNC: 3 G/DL — LOW (ref 3.3–5)
ALP SERPL-CCNC: 47 U/L — SIGNIFICANT CHANGE UP (ref 40–120)
ALT FLD-CCNC: 26 U/L — SIGNIFICANT CHANGE UP (ref 10–45)
ANION GAP SERPL CALC-SCNC: 12 MMOL/L — SIGNIFICANT CHANGE UP (ref 5–17)
APTT BLD: 27.7 SEC — SIGNIFICANT CHANGE UP (ref 24.5–35.6)
AST SERPL-CCNC: 14 U/L — SIGNIFICANT CHANGE UP (ref 10–40)
BASE EXCESS BLDV CALC-SCNC: -5.1 MMOL/L — LOW (ref -2–3)
BASOPHILS # BLD AUTO: 0.02 K/UL — SIGNIFICANT CHANGE UP (ref 0–0.2)
BASOPHILS NFR BLD AUTO: 0.1 % — SIGNIFICANT CHANGE UP (ref 0–2)
BILIRUB SERPL-MCNC: 0.2 MG/DL — SIGNIFICANT CHANGE UP (ref 0.2–1.2)
BUN SERPL-MCNC: 100 MG/DL — HIGH (ref 7–23)
CA-I SERPL-SCNC: 1.15 MMOL/L — SIGNIFICANT CHANGE UP (ref 1.15–1.33)
CALCIUM SERPL-MCNC: 7.8 MG/DL — LOW (ref 8.4–10.5)
CHLORIDE BLDV-SCNC: 100 MMOL/L — SIGNIFICANT CHANGE UP (ref 96–108)
CHLORIDE SERPL-SCNC: 100 MMOL/L — SIGNIFICANT CHANGE UP (ref 96–108)
CO2 BLDV-SCNC: 22 MMOL/L — SIGNIFICANT CHANGE UP (ref 22–26)
CO2 SERPL-SCNC: 18 MMOL/L — LOW (ref 22–31)
CREAT ?TM UR-MCNC: 50 MG/DL — SIGNIFICANT CHANGE UP
CREAT SERPL-MCNC: 2.66 MG/DL — HIGH (ref 0.5–1.3)
EGFR: 24 ML/MIN/1.73M2 — LOW
EOSINOPHIL # BLD AUTO: 0.03 K/UL — SIGNIFICANT CHANGE UP (ref 0–0.5)
EOSINOPHIL NFR BLD AUTO: 0.2 % — SIGNIFICANT CHANGE UP (ref 0–6)
GAS PNL BLDV: 128 MMOL/L — LOW (ref 136–145)
GAS PNL BLDV: SIGNIFICANT CHANGE UP
GAS PNL BLDV: SIGNIFICANT CHANGE UP
GLUCOSE BLDC GLUCOMTR-MCNC: 167 MG/DL — HIGH (ref 70–99)
GLUCOSE BLDC GLUCOMTR-MCNC: 190 MG/DL — HIGH (ref 70–99)
GLUCOSE BLDC GLUCOMTR-MCNC: 215 MG/DL — HIGH (ref 70–99)
GLUCOSE BLDC GLUCOMTR-MCNC: 265 MG/DL — HIGH (ref 70–99)
GLUCOSE BLDC GLUCOMTR-MCNC: 271 MG/DL — HIGH (ref 70–99)
GLUCOSE BLDV-MCNC: 195 MG/DL — HIGH (ref 70–99)
GLUCOSE SERPL-MCNC: 209 MG/DL — HIGH (ref 70–99)
HCO3 BLDV-SCNC: 21 MMOL/L — LOW (ref 22–29)
HCT VFR BLD CALC: 27.5 % — LOW (ref 39–50)
HCT VFR BLDA CALC: 28 % — LOW (ref 39–51)
HGB BLD CALC-MCNC: 9.3 G/DL — LOW (ref 12.6–17.4)
HGB BLD-MCNC: 9 G/DL — LOW (ref 13–17)
HOROWITZ INDEX BLDV+IHG-RTO: 21 — SIGNIFICANT CHANGE UP
IMM GRANULOCYTES NFR BLD AUTO: 0.5 % — SIGNIFICANT CHANGE UP (ref 0–0.9)
INR BLD: 1.32 RATIO — HIGH (ref 0.85–1.18)
LACTATE BLDV-MCNC: 0.8 MMOL/L — SIGNIFICANT CHANGE UP (ref 0.5–2)
LYMPHOCYTES # BLD AUTO: 0.91 K/UL — LOW (ref 1–3.3)
LYMPHOCYTES # BLD AUTO: 6 % — LOW (ref 13–44)
MAGNESIUM SERPL-MCNC: 1.6 MG/DL — SIGNIFICANT CHANGE UP (ref 1.6–2.6)
MCHC RBC-ENTMCNC: 30.2 PG — SIGNIFICANT CHANGE UP (ref 27–34)
MCHC RBC-ENTMCNC: 32.7 GM/DL — SIGNIFICANT CHANGE UP (ref 32–36)
MCV RBC AUTO: 92.3 FL — SIGNIFICANT CHANGE UP (ref 80–100)
MONOCYTES # BLD AUTO: 1 K/UL — HIGH (ref 0–0.9)
MONOCYTES NFR BLD AUTO: 6.6 % — SIGNIFICANT CHANGE UP (ref 2–14)
NEUTROPHILS # BLD AUTO: 13.21 K/UL — HIGH (ref 1.8–7.4)
NEUTROPHILS NFR BLD AUTO: 86.6 % — HIGH (ref 43–77)
NRBC # BLD: 0 /100 WBCS — SIGNIFICANT CHANGE UP (ref 0–0)
OSMOLALITY UR: 371 MOS/KG — SIGNIFICANT CHANGE UP (ref 300–900)
PCO2 BLDV: 40 MMHG — LOW (ref 42–55)
PH BLDV: 7.32 — SIGNIFICANT CHANGE UP (ref 7.32–7.43)
PHOSPHATE SERPL-MCNC: 2.2 MG/DL — LOW (ref 2.5–4.5)
PLATELET # BLD AUTO: 264 K/UL — SIGNIFICANT CHANGE UP (ref 150–400)
PO2 BLDV: 21 MMHG — LOW (ref 25–45)
POTASSIUM BLDV-SCNC: 4.7 MMOL/L — SIGNIFICANT CHANGE UP (ref 3.5–5.1)
POTASSIUM SERPL-MCNC: 4.6 MMOL/L — SIGNIFICANT CHANGE UP (ref 3.5–5.3)
POTASSIUM SERPL-SCNC: 4.6 MMOL/L — SIGNIFICANT CHANGE UP (ref 3.5–5.3)
PROCALCITONIN SERPL-MCNC: 1.97 NG/ML — HIGH (ref 0.02–0.1)
PROT SERPL-MCNC: 6.4 G/DL — SIGNIFICANT CHANGE UP (ref 6–8.3)
PROTHROM AB SERPL-ACNC: 14.4 SEC — HIGH (ref 9.5–13)
RBC # BLD: 2.98 M/UL — LOW (ref 4.2–5.8)
RBC # FLD: 13.2 % — SIGNIFICANT CHANGE UP (ref 10.3–14.5)
SAO2 % BLDV: 27.6 % — LOW (ref 67–88)
SODIUM SERPL-SCNC: 130 MMOL/L — LOW (ref 135–145)
SODIUM UR-SCNC: 31 MMOL/L — SIGNIFICANT CHANGE UP
WBC # BLD: 15.24 K/UL — HIGH (ref 3.8–10.5)
WBC # FLD AUTO: 15.24 K/UL — HIGH (ref 3.8–10.5)

## 2024-02-06 PROCEDURE — 99232 SBSQ HOSP IP/OBS MODERATE 35: CPT | Mod: GC

## 2024-02-06 RX ORDER — INSULIN LISPRO 100/ML
VIAL (ML) SUBCUTANEOUS
Refills: 0 | Status: DISCONTINUED | OUTPATIENT
Start: 2024-02-06 | End: 2024-02-12

## 2024-02-06 RX ORDER — INSULIN LISPRO 100/ML
VIAL (ML) SUBCUTANEOUS AT BEDTIME
Refills: 0 | Status: DISCONTINUED | OUTPATIENT
Start: 2024-02-06 | End: 2024-02-12

## 2024-02-06 RX ORDER — INFLUENZA VIRUS VACCINE 15; 15; 15; 15 UG/.5ML; UG/.5ML; UG/.5ML; UG/.5ML
0.7 SUSPENSION INTRAMUSCULAR ONCE
Refills: 0 | Status: DISCONTINUED | OUTPATIENT
Start: 2024-02-06 | End: 2024-02-12

## 2024-02-06 RX ORDER — ROSUVASTATIN CALCIUM 5 MG/1
1 TABLET ORAL
Qty: 0 | Refills: 0 | DISCHARGE

## 2024-02-06 RX ORDER — MIDODRINE HYDROCHLORIDE 2.5 MG/1
10 TABLET ORAL EVERY 8 HOURS
Refills: 0 | Status: DISCONTINUED | OUTPATIENT
Start: 2024-02-06 | End: 2024-02-06

## 2024-02-06 RX ORDER — LINAGLIPTIN AND METFORMIN HYDROCHLORIDE 2.5; 85 MG/1; MG/1
1 TABLET, FILM COATED ORAL
Refills: 0 | DISCHARGE

## 2024-02-06 RX ORDER — OLMESARTAN MEDOXOMIL 5 MG/1
1 TABLET, FILM COATED ORAL
Qty: 0 | Refills: 0 | DISCHARGE

## 2024-02-06 RX ORDER — SODIUM CHLORIDE 9 MG/ML
1000 INJECTION, SOLUTION INTRAVENOUS ONCE
Refills: 0 | Status: COMPLETED | OUTPATIENT
Start: 2024-02-06 | End: 2024-02-06

## 2024-02-06 RX ORDER — ATORVASTATIN CALCIUM 80 MG/1
40 TABLET, FILM COATED ORAL AT BEDTIME
Refills: 0 | Status: DISCONTINUED | OUTPATIENT
Start: 2024-02-06 | End: 2024-02-12

## 2024-02-06 RX ORDER — SODIUM BICARBONATE 1 MEQ/ML
650 SYRINGE (ML) INTRAVENOUS DAILY
Refills: 0 | Status: DISCONTINUED | OUTPATIENT
Start: 2024-02-06 | End: 2024-02-07

## 2024-02-06 RX ORDER — ACETAMINOPHEN 500 MG
1000 TABLET ORAL ONCE
Refills: 0 | Status: COMPLETED | OUTPATIENT
Start: 2024-02-06 | End: 2024-02-06

## 2024-02-06 RX ORDER — MAGNESIUM SULFATE 500 MG/ML
2 VIAL (ML) INJECTION ONCE
Refills: 0 | Status: COMPLETED | OUTPATIENT
Start: 2024-02-06 | End: 2024-02-06

## 2024-02-06 RX ORDER — DROXIDOPA 100 MG/1
100 CAPSULE ORAL THREE TIMES A DAY
Refills: 0 | Status: DISCONTINUED | OUTPATIENT
Start: 2024-02-06 | End: 2024-02-08

## 2024-02-06 RX ORDER — LANOLIN ALCOHOL/MO/W.PET/CERES
3 CREAM (GRAM) TOPICAL AT BEDTIME
Refills: 0 | Status: DISCONTINUED | OUTPATIENT
Start: 2024-02-06 | End: 2024-02-12

## 2024-02-06 RX ORDER — BUDESONIDE AND FORMOTEROL FUMARATE DIHYDRATE 160; 4.5 UG/1; UG/1
0 AEROSOL RESPIRATORY (INHALATION)
Qty: 0 | Refills: 0 | DISCHARGE

## 2024-02-06 RX ORDER — ALFUZOSIN HYDROCHLORIDE 10 MG/1
1 TABLET, EXTENDED RELEASE ORAL
Qty: 0 | Refills: 0 | DISCHARGE

## 2024-02-06 RX ORDER — MIDODRINE HYDROCHLORIDE 2.5 MG/1
5 TABLET ORAL EVERY 8 HOURS
Refills: 0 | Status: DISCONTINUED | OUTPATIENT
Start: 2024-02-06 | End: 2024-02-06

## 2024-02-06 RX ORDER — ACETAMINOPHEN 500 MG
650 TABLET ORAL EVERY 6 HOURS
Refills: 0 | Status: DISCONTINUED | OUTPATIENT
Start: 2024-02-06 | End: 2024-02-12

## 2024-02-06 RX ORDER — LINAGLIPTIN AND METFORMIN HYDROCHLORIDE 2.5; 85 MG/1; MG/1
1 TABLET, FILM COATED ORAL
Qty: 0 | Refills: 0 | DISCHARGE

## 2024-02-06 RX ORDER — ONDANSETRON 8 MG/1
4 TABLET, FILM COATED ORAL EVERY 8 HOURS
Refills: 0 | Status: DISCONTINUED | OUTPATIENT
Start: 2024-02-06 | End: 2024-02-12

## 2024-02-06 RX ADMIN — Medication 85 MILLIMOLE(S): at 04:30

## 2024-02-06 RX ADMIN — Medication 400 MILLIGRAM(S): at 17:50

## 2024-02-06 RX ADMIN — PIPERACILLIN AND TAZOBACTAM 25 GRAM(S): 4; .5 INJECTION, POWDER, LYOPHILIZED, FOR SOLUTION INTRAVENOUS at 07:17

## 2024-02-06 RX ADMIN — HEPARIN SODIUM 5000 UNIT(S): 5000 INJECTION INTRAVENOUS; SUBCUTANEOUS at 22:06

## 2024-02-06 RX ADMIN — HEPARIN SODIUM 5000 UNIT(S): 5000 INJECTION INTRAVENOUS; SUBCUTANEOUS at 14:06

## 2024-02-06 RX ADMIN — DROXIDOPA 100 MILLIGRAM(S): 100 CAPSULE ORAL at 17:03

## 2024-02-06 RX ADMIN — Medication 2: at 22:51

## 2024-02-06 RX ADMIN — CITALOPRAM 20 MILLIGRAM(S): 10 TABLET, FILM COATED ORAL at 12:02

## 2024-02-06 RX ADMIN — Medication 1: at 17:21

## 2024-02-06 RX ADMIN — PIPERACILLIN AND TAZOBACTAM 25 GRAM(S): 4; .5 INJECTION, POWDER, LYOPHILIZED, FOR SOLUTION INTRAVENOUS at 22:05

## 2024-02-06 RX ADMIN — CHLORHEXIDINE GLUCONATE 1 APPLICATION(S): 213 SOLUTION TOPICAL at 05:09

## 2024-02-06 RX ADMIN — Medication 400 MILLIGRAM(S): at 07:17

## 2024-02-06 RX ADMIN — Medication 975 MILLIGRAM(S): at 08:00

## 2024-02-06 RX ADMIN — HEPARIN SODIUM 5000 UNIT(S): 5000 INJECTION INTRAVENOUS; SUBCUTANEOUS at 05:07

## 2024-02-06 RX ADMIN — Medication 1000 MILLIGRAM(S): at 07:47

## 2024-02-06 RX ADMIN — DROXIDOPA 100 MILLIGRAM(S): 100 CAPSULE ORAL at 10:22

## 2024-02-06 RX ADMIN — MIDODRINE HYDROCHLORIDE 5 MILLIGRAM(S): 2.5 TABLET ORAL at 05:09

## 2024-02-06 RX ADMIN — PIPERACILLIN AND TAZOBACTAM 25 GRAM(S): 4; .5 INJECTION, POWDER, LYOPHILIZED, FOR SOLUTION INTRAVENOUS at 02:30

## 2024-02-06 RX ADMIN — Medication 650 MILLIGRAM(S): at 12:02

## 2024-02-06 RX ADMIN — Medication 3: at 08:44

## 2024-02-06 RX ADMIN — ATORVASTATIN CALCIUM 40 MILLIGRAM(S): 80 TABLET, FILM COATED ORAL at 22:06

## 2024-02-06 RX ADMIN — PIPERACILLIN AND TAZOBACTAM 25 GRAM(S): 4; .5 INJECTION, POWDER, LYOPHILIZED, FOR SOLUTION INTRAVENOUS at 14:05

## 2024-02-06 RX ADMIN — SODIUM CHLORIDE 4000 MILLILITER(S): 9 INJECTION, SOLUTION INTRAVENOUS at 00:32

## 2024-02-06 RX ADMIN — Medication 1000 MILLIGRAM(S): at 18:20

## 2024-02-06 RX ADMIN — Medication 15 MILLIGRAM(S): at 08:00

## 2024-02-06 RX ADMIN — Medication 1: at 12:58

## 2024-02-06 RX ADMIN — PIPERACILLIN AND TAZOBACTAM 200 GRAM(S): 4; .5 INJECTION, POWDER, LYOPHILIZED, FOR SOLUTION INTRAVENOUS at 00:55

## 2024-02-06 RX ADMIN — Medication 25 GRAM(S): at 02:30

## 2024-02-06 NOTE — PATIENT PROFILE ADULT - FALL HARM RISK - RISK INTERVENTIONS
Assistance OOB with selected safe patient handling equipment/Assistance with ambulation/Communicate Fall Risk and Risk Factors to all staff, patient, and family/Monitor for mental status changes/Monitor gait and stability/Provide patient with walking aids - walker, cane, crutches/Reinforce activity limits and safety measures with patient and family/Reorient to person, place and time as needed/Review medications for side effects contributing to fall risk/Sit up slowly, dangle for a short time, stand at bedside before walking/Toileting schedule using arm’s reach rule for commode and bathroom/Use of alarms - bed, chair and/or voice tab/Visual Cue: Yellow wristband/Bed in lowest position, wheels locked, appropriate side rails in place/Call bell, personal items and telephone in reach/Instruct patient to call for assistance before getting out of bed or chair/Non-slip footwear when patient is out of bed/[place]aley to call system/Physically safe environment - no spills, clutter or unnecessary equipment/Purposeful Proactive Rounding/Room/bathroom lighting operational, light cord in reach

## 2024-02-06 NOTE — CHART NOTE - NSCHARTNOTEFT_GEN_A_CORE
MICU Transfer Note  ---------------------------    Transfer from: MICU  Transfer to:  ( x) Medicine    (  ) Telemetry    (  ) RCU    (  ) Palliative    (  ) Stroke Unit    (  ) _______________  Accepting Physician: Dr. Christine Horton      [facility] MedicineU COURSE        OBJECTIVE --  Vital Signs Last 24 Hrs  T(C): 36.4 (06 Feb 2024 12:00), Max: 38.8 (06 Feb 2024 07:15)  T(F): 97.5 (06 Feb 2024 12:00), Max: 101.8 (06 Feb 2024 07:15)  HR: 57 (06 Feb 2024 12:00) (57 - 91)  BP: 93/51 (06 Feb 2024 12:00) (80/49 - 158/67)  BP(mean): 69 (06 Feb 2024 12:00) (56 - 103)  RR: 17 (06 Feb 2024 12:00) (15 - 49)  SpO2: 93% (06 Feb 2024 12:00) (86% - 100%)    Parameters below as of 06 Feb 2024 07:15  Patient On (Oxygen Delivery Method): room air        I&O's Summary    05 Feb 2024 07:01  -  06 Feb 2024 07:00  --------------------------------------------------------  IN: 1615.1 mL / OUT: 1970 mL / NET: -354.9 mL    06 Feb 2024 07:01  -  06 Feb 2024 14:38  --------------------------------------------------------  IN: 449.9 mL / OUT: 540 mL / NET: -90.1 mL        MEDICATIONS  (STANDING):  atorvastatin 40 milliGRAM(s) Oral at bedtime  chlorhexidine 2% Cloths 1 Application(s) Topical <User Schedule>  citalopram 20 milliGRAM(s) Oral daily  droxidopa 100 milliGRAM(s) Oral three times a day  heparin   Injectable 5000 Unit(s) SubCutaneous every 8 hours  influenza  Vaccine (HIGH DOSE) 0.7 milliLiter(s) IntraMuscular once  insulin lispro (ADMELOG) corrective regimen sliding scale   SubCutaneous at bedtime  insulin lispro (ADMELOG) corrective regimen sliding scale   SubCutaneous three times a day before meals  piperacillin/tazobactam IVPB.. 3.375 Gram(s) IV Intermittent every 8 hours  sodium bicarbonate 650 milliGRAM(s) Oral daily    MEDICATIONS  (PRN):        LABS                                            9.0                   Neurophils% (auto):   86.6   (02-06 @ 00:39):    15.24)-----------(264          Lymphocytes% (auto):  6.0                                           27.5                   Eosinphils% (auto):   0.2      Manual%: Neutrophils x    ; Lymphocytes x    ; Eosinophils x    ; Bands%: x    ; Blasts x                                    130    |  100    |  100                 Calcium: 7.8   / iCa: x      (02-06 @ 00:39)    ----------------------------<  209       Magnesium: 1.6                              4.6     |  18     |  2.66             Phosphorous: 2.2      TPro  6.4    /  Alb  3.0    /  TBili  0.2    /  DBili  x      /  AST  14     /  ALT  26     /  AlkPhos  47     06 Feb 2024 00:39    ( 02-06 @ 00:39 )   PT: 14.4 sec;   INR: 1.32 ratio  aPTT: 27.7 sec          ASSESSMENT & PLAN:         For Follow-Up:  [ ] BCx and UCx follow up   [ ] MICU Transfer Note  ---------------------------    Transfer from: MICU  Transfer to:  (x) Medicine    (  ) Telemetry    (  ) RCU    (  ) Palliative    (  ) Stroke Unit    (  ) _______________  Accepting Physician: Dr. Christine Horton      MICU COURSE    Patient is a 77yo male with PMH of bladder tumor s/p resection, TURBT/TURP, diabetes, HTN, BPH, Lung cancer presented for fever and confusion. Seen outpatient and found to have positive UA with cultures growing E. coli.  Notes febrile at home with associated symptoms of decreased p.o. intake and increasing confusion. Denies nausea, vomiting, abdominal pain, hematuria, dysuria, urinary urgency. In ED, febrile 99.7 and hypotensive.  Given 3L IVF without improvement requiring levo 0.04.  IVC collapsible with predominant B-lines anteriorly on POCUS given 500 NS with pressors.  Labs pertinent for WBC 16, , BUN/Cr 108/3.08, and  UA positive for leukocyte esterase and bacteria.  Started on ceftriaxone for urosepsis.  MICU consulted for further management of urosepsis on pressors. Because of presumed worsening of urosepsis, antibiotics were broadened to Zosyn for management of urosepsis. Patient was weaned off of norepinephrine overnight and transitioned to midodrine. However patient developed asymptomatic bradycardia shortly after administration and was switched to droxydopa 100 TID. Patient is stable and blood pressure is at the patient's regular around  systolic blood pressure.         OBJECTIVE --  Vital Signs Last 24 Hrs  T(C): 36.4 (06 Feb 2024 12:00), Max: 38.8 (06 Feb 2024 07:15)  T(F): 97.5 (06 Feb 2024 12:00), Max: 101.8 (06 Feb 2024 07:15)  HR: 57 (06 Feb 2024 12:00) (57 - 91)  BP: 93/51 (06 Feb 2024 12:00) (80/49 - 158/67)  BP(mean): 69 (06 Feb 2024 12:00) (56 - 103)  RR: 17 (06 Feb 2024 12:00) (15 - 49)  SpO2: 93% (06 Feb 2024 12:00) (86% - 100%)    Parameters below as of 06 Feb 2024 07:15  Patient On (Oxygen Delivery Method): room air        I&O's Summary    05 Feb 2024 07:01  -  06 Feb 2024 07:00  --------------------------------------------------------  IN: 1615.1 mL / OUT: 1970 mL / NET: -354.9 mL    06 Feb 2024 07:01  -  06 Feb 2024 14:38  --------------------------------------------------------  IN: 449.9 mL / OUT: 540 mL / NET: -90.1 mL        MEDICATIONS  (STANDING):  atorvastatin 40 milliGRAM(s) Oral at bedtime  chlorhexidine 2% Cloths 1 Application(s) Topical <User Schedule>  citalopram 20 milliGRAM(s) Oral daily  droxidopa 100 milliGRAM(s) Oral three times a day  heparin   Injectable 5000 Unit(s) SubCutaneous every 8 hours  influenza  Vaccine (HIGH DOSE) 0.7 milliLiter(s) IntraMuscular once  insulin lispro (ADMELOG) corrective regimen sliding scale   SubCutaneous at bedtime  insulin lispro (ADMELOG) corrective regimen sliding scale   SubCutaneous three times a day before meals  piperacillin/tazobactam IVPB.. 3.375 Gram(s) IV Intermittent every 8 hours  sodium bicarbonate 650 milliGRAM(s) Oral daily    MEDICATIONS  (PRN):        LABS                                            9.0                   Neurophils% (auto):   86.6   (02-06 @ 00:39):    15.24)-----------(264          Lymphocytes% (auto):  6.0                                           27.5                   Eosinphils% (auto):   0.2      Manual%: Neutrophils x    ; Lymphocytes x    ; Eosinophils x    ; Bands%: x    ; Blasts x                                    130    |  100    |  100                 Calcium: 7.8   / iCa: x      (02-06 @ 00:39)    ----------------------------<  209       Magnesium: 1.6                              4.6     |  18     |  2.66             Phosphorous: 2.2      TPro  6.4    /  Alb  3.0    /  TBili  0.2    /  DBili  x      /  AST  14     /  ALT  26     /  AlkPhos  47     06 Feb 2024 00:39    ( 02-06 @ 00:39 )   PT: 14.4 sec;   INR: 1.32 ratio  aPTT: 27.7 sec          ASSESSMENT & PLAN:     77 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     Neuro  - No active issues. A&Ox3     Pulm  - No active issues. Patient is on room air   - CT non-con ordered to evaluate for pleural disease given behavior of lung pleura on echo    CV  # Septic shock   - s/p 3.5L, remains hypotensive despite adequate resuscitation  - Initially on levophed, titrate to MAP > 65, turned off overnight and switched to midodrine then droxydopa for transition plan  - Hold home anti-HTN    Renal   # Hyponatremia   - Na on presentation 124, most likely hypovolemic hyponatremia s/p 3L IVF resolving now up to 130  - Repeat BMP and check urine lytes     # CKD   - Unknown baseline. Last Cr in system in 05/2023 was 2.29. On presentation, Cr 3.08  - Monitor I & O    GI   - No active issues     Infectious disease   # Complicated UTI   - History of neobladder and prior ucx with pseudomonas resistant to fluroquinolones   - Outpt Ucx E. coli, unknown sensitivities   - Given severity of presentation and risk factors, escalate to zosyn   - Follow up Ucx, Bcx still pending     Endo   # T2DM  - Check A1c  - Low dose corrective scale insulin and adjust to keep glucose <180       For Follow-Up:  [ ] BCx and UCx follow up   [ ] Monitor Blood Pressure MICU Transfer Note  ---------------------------    Transfer from: MICU  Transfer to:  (x) Medicine    (  ) Telemetry    (  ) RCU    (  ) Palliative    (  ) Stroke Unit    (  ) _______________  Accepting Physician: Dr. Christine Horton      MICU COURSE    Patient is a 77yo male with PMH of bladder tumor s/p resection, TURBT/TURP, diabetes, HTN, BPH, Lung cancer presented for fever and confusion. Seen outpatient and found to have positive UA with cultures growing E. coli.  Notes febrile at home with associated symptoms of decreased p.o. intake and increasing confusion. Denies nausea, vomiting, abdominal pain, hematuria, dysuria, urinary urgency. In ED, febrile 99.7 and hypotensive.  Given 3L IVF without improvement requiring levo 0.04.  IVC collapsible with predominant B-lines anteriorly on POCUS given 500 NS with pressors.  Labs pertinent for WBC 16, , BUN/Cr 108/3.08, and  UA positive for leukocyte esterase and bacteria.  Started on ceftriaxone for urosepsis.  MICU consulted for further management of urosepsis on pressors. Because of presumed worsening of urosepsis, antibiotics were broadened to Zosyn for management of urosepsis. Patient was weaned off of norepinephrine overnight and transitioned to midodrine. However patient developed asymptomatic bradycardia shortly after administration and was switched to droxydopa 100 TID. Patient is stable and blood pressure is at the patient's regular around  systolic blood pressure.         OBJECTIVE --  Vital Signs Last 24 Hrs  T(C): 36.4 (06 Feb 2024 12:00), Max: 38.8 (06 Feb 2024 07:15)  T(F): 97.5 (06 Feb 2024 12:00), Max: 101.8 (06 Feb 2024 07:15)  HR: 57 (06 Feb 2024 12:00) (57 - 91)  BP: 93/51 (06 Feb 2024 12:00) (80/49 - 158/67)  BP(mean): 69 (06 Feb 2024 12:00) (56 - 103)  RR: 17 (06 Feb 2024 12:00) (15 - 49)  SpO2: 93% (06 Feb 2024 12:00) (86% - 100%)    Parameters below as of 06 Feb 2024 07:15  Patient On (Oxygen Delivery Method): room air        I&O's Summary    05 Feb 2024 07:01  -  06 Feb 2024 07:00  --------------------------------------------------------  IN: 1615.1 mL / OUT: 1970 mL / NET: -354.9 mL    06 Feb 2024 07:01  -  06 Feb 2024 14:38  --------------------------------------------------------  IN: 449.9 mL / OUT: 540 mL / NET: -90.1 mL        MEDICATIONS  (STANDING):  atorvastatin 40 milliGRAM(s) Oral at bedtime  chlorhexidine 2% Cloths 1 Application(s) Topical <User Schedule>  citalopram 20 milliGRAM(s) Oral daily  droxidopa 100 milliGRAM(s) Oral three times a day  heparin   Injectable 5000 Unit(s) SubCutaneous every 8 hours  influenza  Vaccine (HIGH DOSE) 0.7 milliLiter(s) IntraMuscular once  insulin lispro (ADMELOG) corrective regimen sliding scale   SubCutaneous at bedtime  insulin lispro (ADMELOG) corrective regimen sliding scale   SubCutaneous three times a day before meals  piperacillin/tazobactam IVPB.. 3.375 Gram(s) IV Intermittent every 8 hours  sodium bicarbonate 650 milliGRAM(s) Oral daily    MEDICATIONS  (PRN):        LABS                                            9.0                   Neurophils% (auto):   86.6   (02-06 @ 00:39):    15.24)-----------(264          Lymphocytes% (auto):  6.0                                           27.5                   Eosinphils% (auto):   0.2      Manual%: Neutrophils x    ; Lymphocytes x    ; Eosinophils x    ; Bands%: x    ; Blasts x                                    130    |  100    |  100                 Calcium: 7.8   / iCa: x      (02-06 @ 00:39)    ----------------------------<  209       Magnesium: 1.6                              4.6     |  18     |  2.66             Phosphorous: 2.2      TPro  6.4    /  Alb  3.0    /  TBili  0.2    /  DBili  x      /  AST  14     /  ALT  26     /  AlkPhos  47     06 Feb 2024 00:39    ( 02-06 @ 00:39 )   PT: 14.4 sec;   INR: 1.32 ratio  aPTT: 27.7 sec          ASSESSMENT & PLAN:     79 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     Neuro  - No active issues. A&Ox3     Pulm  - No active issues. Patient is on room air   - CT non-con ordered to evaluate for pleural disease given behavior of lung pleura on echo    CV  # Septic shock   - s/p 3.5L, remains hypotensive despite adequate resuscitation  - Initially on levophed, titrate to MAP > 65, turned off overnight and switched to midodrine then droxydopa for transition plan  - Hold home anti-HTN    Renal   # Hyponatremia   - Na on presentation 124, most likely hypovolemic hyponatremia s/p 3L IVF resolving now up to 130  - Repeat BMP and check urine lytes     # CKD   - Unknown baseline. Last Cr in system in 05/2023 was 2.29. On presentation, Cr 3.08  - Monitor I & O    GI   - No active issues     Infectious disease   # Complicated UTI   - History of neobladder and prior ucx with pseudomonas resistant to fluroquinolones   - Outpt Ucx E. coli, unknown sensitivities   - Given severity of presentation and risk factors, escalate to zosyn   - Follow up Ucx, Bcx still pending     Endo   # T2DM  - Check A1c  - Low dose corrective scale insulin and adjust to keep glucose <180       d/w MAR (Sundeep Kaur) and SCOTTY Girard.      For Follow-Up:  [ ] BCx and UCx follow up   [ ] Monitor Blood Pressure  [ ] Follow-up CT non-con for lung disease MICU Transfer Note  ---------------------------    Transfer from: MICU  Transfer to:  (x) Medicine    (  ) Telemetry    (  ) RCU    (  ) Palliative    (  ) Stroke Unit    (  ) _______________  Accepting Physician: Dr. Christine Horton      MICU COURSE    Patient is a 77yo male with PMH of bladder tumor s/p resection, TURBT/TURP, diabetes, HTN, BPH, Lung cancer presented for fever and confusion. Seen outpatient and found to have positive UA with cultures growing E. coli.  Notes febrile at home with associated symptoms of decreased p.o. intake and increasing confusion. Denies nausea, vomiting, abdominal pain, hematuria, dysuria, urinary urgency. In ED, febrile 99.7 and hypotensive.  Given 3L IVF without improvement requiring levo 0.04.  IVC collapsible with predominant B-lines anteriorly on POCUS given 500 NS with pressors.  Labs pertinent for WBC 16, , BUN/Cr 108/3.08, and  UA positive for leukocyte esterase and bacteria.  Started on ceftriaxone for urosepsis.  MICU consulted for further management of urosepsis on pressors. Because of presumed worsening of urosepsis, antibiotics were broadened to Zosyn for management of urosepsis. Patient was weaned off of norepinephrine overnight and transitioned to midodrine. However patient developed asymptomatic bradycardia shortly after administration and was switched to droxydopa 100 TID. Patient is stable and blood pressure is at the patient's regular around  systolic blood pressure.     d/w MAR (Sundeep Kaur) and SCOTTY Girard.      For Follow-Up:  [ ] BCx and UCx follow up   [ ] Monitor Blood Pressure  [ ] Follow-up CT non-con for lung disease  [ ] Bladder scan q6h, ok to straight cath or potter per urology with neobladder      OBJECTIVE --  Vital Signs Last 24 Hrs  T(C): 36.4 (06 Feb 2024 12:00), Max: 38.8 (06 Feb 2024 07:15)  T(F): 97.5 (06 Feb 2024 12:00), Max: 101.8 (06 Feb 2024 07:15)  HR: 57 (06 Feb 2024 12:00) (57 - 91)  BP: 93/51 (06 Feb 2024 12:00) (80/49 - 158/67)  BP(mean): 69 (06 Feb 2024 12:00) (56 - 103)  RR: 17 (06 Feb 2024 12:00) (15 - 49)  SpO2: 93% (06 Feb 2024 12:00) (86% - 100%)    Parameters below as of 06 Feb 2024 07:15  Patient On (Oxygen Delivery Method): room air        I&O's Summary    05 Feb 2024 07:01  -  06 Feb 2024 07:00  --------------------------------------------------------  IN: 1615.1 mL / OUT: 1970 mL / NET: -354.9 mL    06 Feb 2024 07:01  -  06 Feb 2024 14:38  --------------------------------------------------------  IN: 449.9 mL / OUT: 540 mL / NET: -90.1 mL        MEDICATIONS  (STANDING):  atorvastatin 40 milliGRAM(s) Oral at bedtime  chlorhexidine 2% Cloths 1 Application(s) Topical <User Schedule>  citalopram 20 milliGRAM(s) Oral daily  droxidopa 100 milliGRAM(s) Oral three times a day  heparin   Injectable 5000 Unit(s) SubCutaneous every 8 hours  influenza  Vaccine (HIGH DOSE) 0.7 milliLiter(s) IntraMuscular once  insulin lispro (ADMELOG) corrective regimen sliding scale   SubCutaneous at bedtime  insulin lispro (ADMELOG) corrective regimen sliding scale   SubCutaneous three times a day before meals  piperacillin/tazobactam IVPB.. 3.375 Gram(s) IV Intermittent every 8 hours  sodium bicarbonate 650 milliGRAM(s) Oral daily    MEDICATIONS  (PRN):       ASSESSMENT & PLAN:     79 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     Neuro  - No active issues. A&Ox3     Pulm  - No active issues. Patient is on room air   - CT non-con ordered to evaluate for pleural disease given behavior of lung pleura on echo    CV  # Septic shock   - s/p 3.5L, remains hypotensive despite adequate resuscitation  - Initially on levophed, titrate to MAP > 65, turned off overnight and switched to midodrine then droxydopa for transition plan  - Hold home anti-HTN    Renal   # Hyponatremia   - Na on presentation 124, most likely hypovolemic hyponatremia s/p 3L IVF resolving now up to 130  - Repeat BMP and check urine lytes     # CKD   - Unknown baseline. Last Cr in system in 05/2023 was 2.29. On presentation, Cr 3.08  - Monitor I & O    GI   - No active issues     Infectious disease   # Complicated UTI   - History of neobladder and prior ucx with pseudomonas resistant to fluroquinolones   - Outpt Ucx E. coli, unknown sensitivities   - Given severity of presentation and risk factors, escalate to zosyn   - Follow up Ucx, Bcx still pending     Endo   # T2DM  - Check A1c  - Low dose corrective scale insulin and adjust to keep glucose <180

## 2024-02-06 NOTE — PROGRESS NOTE ADULT - SUBJECTIVE AND OBJECTIVE BOX
CHIEF COMPLAINT: Patient is a 78y old  Male who presents with a chief complaint of Hypotension (05 Feb 2024 22:38)    Interval Events:    REVIEW OF SYSTEMS:  Constitutional:   Eyes:  ENT:  CV:  Resp:  GI:  :  MSK:  Integumentary:  Neurological:  Psychiatric:  Endocrine:  Hematologic/Lymphatic:  Allergic/Immunologic:  [ ] All other systems negative  [ ] Unable to assess ROS because ________    OBJECTIVE:  ICU Vital Signs Last 24 Hrs  T(C): 38.8 (06 Feb 2024 07:15), Max: 38.8 (06 Feb 2024 07:15)  T(F): 101.8 (06 Feb 2024 07:15), Max: 101.8 (06 Feb 2024 07:15)  HR: 87 (06 Feb 2024 07:15) (59 - 93)  BP: 144/63 (06 Feb 2024 07:15) (80/49 - 158/67)  BP(mean): 90 (06 Feb 2024 07:15) (56 - 103)  ABP: --  ABP(mean): --  RR: 25 (06 Feb 2024 07:15) (15 - 49)  SpO2: 89% (06 Feb 2024 07:15) (86% - 100%)    O2 Parameters below as of 06 Feb 2024 07:15  Patient On (Oxygen Delivery Method): room air              02-05 @ 07:01  -  02-06 @ 07:00  --------------------------------------------------------  IN: 1615.1 mL / OUT: 1860 mL / NET: -244.9 mL    02-06 @ 07:01  -  02-06 @ 07:39  --------------------------------------------------------  IN: 208.3 mL / OUT: 0 mL / NET: 208.3 mL      CAPILLARY BLOOD GLUCOSE      POCT Blood Glucose.: 215 mg/dL (06 Feb 2024 05:13)      PHYSICAL EXAM:  General: NAD, doing well.  HEENT: [ ] Intubated. [ ] OG in place. Brainstem reflexes in place  Lymph Nodes: No LEWIS palpated  Neck: trachea midline. no trach.   Respiratory: CTA b/l. No rales, rhonchi, wheezing appreciated.  Cardiovascular: RRR. +s1/s2, -s3/s4. No murmur, rubs, gallops. No edema  Abdomen: NT/ND. +BS, No HSM.   Extremities: 2+ pulses  Skin: [ ] edematous. No rashes, ecchymoses, or petechiae noted  Neurological: AAOx3. DTR 2+. strength 5/5 UE/LE bilaterally.   Psychiatry: Appropriate mood and affect.    HOSPITAL MEDICATIONS:  MEDICATIONS  (STANDING):  atorvastatin 40 milliGRAM(s) Oral at bedtime  chlorhexidine 2% Cloths 1 Application(s) Topical <User Schedule>  citalopram 20 milliGRAM(s) Oral daily  heparin   Injectable 5000 Unit(s) SubCutaneous every 8 hours  influenza  Vaccine (HIGH DOSE) 0.7 milliLiter(s) IntraMuscular once  insulin lispro (ADMELOG) corrective regimen sliding scale   SubCutaneous at bedtime  insulin lispro (ADMELOG) corrective regimen sliding scale   SubCutaneous three times a day before meals  midodrine 5 milliGRAM(s) Oral every 8 hours  norepinephrine Infusion 0.05 MICROgram(s)/kG/Min (6.81 mL/Hr) IV Continuous <Continuous>  piperacillin/tazobactam IVPB.. 3.375 Gram(s) IV Intermittent every 8 hours  sodium bicarbonate 650 milliGRAM(s) Oral daily    MEDICATIONS  (PRN):      LABS:  (02-06 @ 00:39)                        9.0  15.24 )-----------( 264                 27.5    Neutrophils = 13.21 (86.6%)  Lymphocytes = 0.91 (6.0%)  Eosinophils = 0.03 (0.2%)  Basophils = 0.02 (0.1%)  Monocytes = 1.00 (6.6%)  Bands = --%    WBC Trend: 15.24<--, 16.77<--  Hb Trend: 9.0<--, 9.8<--  Plt Trend: 264<--, 315<--  02-06    130<L>  |  100  |  100<H>  ----------------------------<  209<H>  4.6   |  18<L>  |  2.66<H>    Ca    7.8<L>      06 Feb 2024 00:39  Phos  2.2     02-06  Mg     1.6     02-06    TPro  6.4  /  Alb  3.0<L>  /  TBili  0.2  /  DBili  x   /  AST  14  /  ALT  26  /  AlkPhos  47  02-06    Creatinine Trend: 2.66<--, 2.59<--, 3.08<--  PT/INR - ( 06 Feb 2024 00:39 )   PT: 14.4 sec;   INR: 1.32 ratio         PTT - ( 06 Feb 2024 00:39 )  PTT:27.7 sec  Urinalysis Basic - ( 06 Feb 2024 00:39 )    Color: x / Appearance: x / SG: x / pH: x  Gluc: 209 mg/dL / Ketone: x  / Bili: x / Urobili: x   Blood: x / Protein: x / Nitrite: x   Leuk Esterase: x / RBC: x / WBC x   Sq Epi: x / Non Sq Epi: x / Bacteria: x        Venous Blood Gas:  02-06 @ 00:23  7.32/40/21/21/27.6  VBG Lactate: 0.8  Venous Blood Gas:  02-05 @ 15:01  7.33/36/26/19/46.2  VBG Lactate: 2.3          MICROBIOLOGY:   Blood Cx:  Urine Cx:  Sputum Cx:  Legionella:  RVP:    RADIOLOGY:  X Ray:  CT:  MRI:  Ultrasound:  [ ] Reviewed and interpreted by me    EKG:   CHIEF COMPLAINT: Patient is a 78y old  Male who presents with a chief complaint of Hypotension (05 Feb 2024 22:38)    Interval Events: Off levofed overnight, switching to droxydopa in morning    REVIEW OF SYSTEMS: No nausea, vomiting, GI distress, or headaches   [x] All other systems negative    OBJECTIVE:  ICU Vital Signs Last 24 Hrs  T(C): 38.8 (06 Feb 2024 07:15), Max: 38.8 (06 Feb 2024 07:15)  T(F): 101.8 (06 Feb 2024 07:15), Max: 101.8 (06 Feb 2024 07:15)  HR: 87 (06 Feb 2024 07:15) (59 - 93)  BP: 144/63 (06 Feb 2024 07:15) (80/49 - 158/67)  BP(mean): 90 (06 Feb 2024 07:15) (56 - 103)  ABP: --  ABP(mean): --  RR: 25 (06 Feb 2024 07:15) (15 - 49)  SpO2: 89% (06 Feb 2024 07:15) (86% - 100%)    O2 Parameters below as of 06 Feb 2024 07:15  Patient On (Oxygen Delivery Method): room air              02-05 @ 07:01  -  02-06 @ 07:00  --------------------------------------------------------  IN: 1615.1 mL / OUT: 1860 mL / NET: -244.9 mL    02-06 @ 07:01  -  02-06 @ 07:39  --------------------------------------------------------  IN: 208.3 mL / OUT: 0 mL / NET: 208.3 mL      CAPILLARY BLOOD GLUCOSE      POCT Blood Glucose.: 215 mg/dL (06 Feb 2024 05:13)      PHYSICAL EXAM:  General: NAD, doing well.  HEENT: Brainstem reflexes in place  Respiratory: CTA b/l. No rales, rhonchi, wheezing appreciated.  Cardiovascular: RRR. +s1/s2, -s3/s4. No murmur, rubs, gallops. No edema  Abdomen: NT/ND. +BS, No HSM.   Extremities: 2+ pulses  Skin: [ ] edematous. No rashes, ecchymoses, or petechiae noted  Neurological: AAOx3. DTR 2+. strength 5/5 UE/LE bilaterally.   Psychiatry: Appropriate mood and affect.    HOSPITAL MEDICATIONS:  MEDICATIONS  (STANDING):  atorvastatin 40 milliGRAM(s) Oral at bedtime  chlorhexidine 2% Cloths 1 Application(s) Topical <User Schedule>  citalopram 20 milliGRAM(s) Oral daily  heparin   Injectable 5000 Unit(s) SubCutaneous every 8 hours  influenza  Vaccine (HIGH DOSE) 0.7 milliLiter(s) IntraMuscular once  insulin lispro (ADMELOG) corrective regimen sliding scale   SubCutaneous at bedtime  insulin lispro (ADMELOG) corrective regimen sliding scale   SubCutaneous three times a day before meals  midodrine 5 milliGRAM(s) Oral every 8 hours  norepinephrine Infusion 0.05 MICROgram(s)/kG/Min (6.81 mL/Hr) IV Continuous <Continuous>  piperacillin/tazobactam IVPB.. 3.375 Gram(s) IV Intermittent every 8 hours  sodium bicarbonate 650 milliGRAM(s) Oral daily    MEDICATIONS  (PRN):      LABS:  (02-06 @ 00:39)                        9.0  15.24 )-----------( 264                 27.5    Neutrophils = 13.21 (86.6%)  Lymphocytes = 0.91 (6.0%)  Eosinophils = 0.03 (0.2%)  Basophils = 0.02 (0.1%)  Monocytes = 1.00 (6.6%)  Bands = --%    WBC Trend: 15.24<--, 16.77<--  Hb Trend: 9.0<--, 9.8<--  Plt Trend: 264<--, 315<--  02-06    130<L>  |  100  |  100<H>  ----------------------------<  209<H>  4.6   |  18<L>  |  2.66<H>    Ca    7.8<L>      06 Feb 2024 00:39  Phos  2.2     02-06  Mg     1.6     02-06    TPro  6.4  /  Alb  3.0<L>  /  TBili  0.2  /  DBili  x   /  AST  14  /  ALT  26  /  AlkPhos  47  02-06    Creatinine Trend: 2.66<--, 2.59<--, 3.08<--  PT/INR - ( 06 Feb 2024 00:39 )   PT: 14.4 sec;   INR: 1.32 ratio         PTT - ( 06 Feb 2024 00:39 )  PTT:27.7 sec  Urinalysis Basic - ( 06 Feb 2024 00:39 )    Color: x / Appearance: x / SG: x / pH: x  Gluc: 209 mg/dL / Ketone: x  / Bili: x / Urobili: x   Blood: x / Protein: x / Nitrite: x   Leuk Esterase: x / RBC: x / WBC x   Sq Epi: x / Non Sq Epi: x / Bacteria: x        Venous Blood Gas:  02-06 @ 00:23  7.32/40/21/21/27.6  VBG Lactate: 0.8  Venous Blood Gas:  02-05 @ 15:01  7.33/36/26/19/46.2  VBG Lactate: 2.3          MICROBIOLOGY:   Blood Cx:  Urine Cx:  Sputum Cx:  Legionella:  RVP:    RADIOLOGY:  X Ray:  CT:  MRI:  Ultrasound:  [ ] Reviewed and interpreted by me    EKG:   CHIEF COMPLAINT: Patient is a 78y old  Male who presents with a chief complaint of Hypotension (05 Feb 2024 22:38)    Interval Events: Off levofed overnight,     REVIEW OF SYSTEMS: No nausea, vomiting, GI distress, or headaches   [x] All other systems negative    OBJECTIVE:  ICU Vital Signs Last 24 Hrs  T(C): 38.8 (06 Feb 2024 07:15), Max: 38.8 (06 Feb 2024 07:15)  T(F): 101.8 (06 Feb 2024 07:15), Max: 101.8 (06 Feb 2024 07:15)  HR: 87 (06 Feb 2024 07:15) (59 - 93)  BP: 144/63 (06 Feb 2024 07:15) (80/49 - 158/67)  BP(mean): 90 (06 Feb 2024 07:15) (56 - 103)  ABP: --  ABP(mean): --  RR: 25 (06 Feb 2024 07:15) (15 - 49)  SpO2: 89% (06 Feb 2024 07:15) (86% - 100%)    O2 Parameters below as of 06 Feb 2024 07:15  Patient On (Oxygen Delivery Method): room air              02-05 @ 07:01  -  02-06 @ 07:00  --------------------------------------------------------  IN: 1615.1 mL / OUT: 1860 mL / NET: -244.9 mL    02-06 @ 07:01  -  02-06 @ 07:39  --------------------------------------------------------  IN: 208.3 mL / OUT: 0 mL / NET: 208.3 mL      CAPILLARY BLOOD GLUCOSE      POCT Blood Glucose.: 215 mg/dL (06 Feb 2024 05:13)      PHYSICAL EXAM:  General: NAD, doing well.  HEENT: Brainstem reflexes in place  Respiratory: CTA b/l. No rales, rhonchi, wheezing appreciated.  Cardiovascular: RRR. +s1/s2, -s3/s4. No murmur, rubs, gallops. No edema  Abdomen: NT/ND. +BS, No HSM.   Extremities: 2+ pulses  Skin: [ ] edematous. No rashes, ecchymoses, or petechiae noted  Neurological: AAOx3. DTR 2+. strength 5/5 UE/LE bilaterally.   Psychiatry: Appropriate mood and affect.    HOSPITAL MEDICATIONS:  MEDICATIONS  (STANDING):  atorvastatin 40 milliGRAM(s) Oral at bedtime  chlorhexidine 2% Cloths 1 Application(s) Topical <User Schedule>  citalopram 20 milliGRAM(s) Oral daily  heparin   Injectable 5000 Unit(s) SubCutaneous every 8 hours  influenza  Vaccine (HIGH DOSE) 0.7 milliLiter(s) IntraMuscular once  insulin lispro (ADMELOG) corrective regimen sliding scale   SubCutaneous at bedtime  insulin lispro (ADMELOG) corrective regimen sliding scale   SubCutaneous three times a day before meals  midodrine 5 milliGRAM(s) Oral every 8 hours  norepinephrine Infusion 0.05 MICROgram(s)/kG/Min (6.81 mL/Hr) IV Continuous <Continuous>  piperacillin/tazobactam IVPB.. 3.375 Gram(s) IV Intermittent every 8 hours  sodium bicarbonate 650 milliGRAM(s) Oral daily    MEDICATIONS  (PRN):      LABS:  (02-06 @ 00:39)                        9.0  15.24 )-----------( 264                 27.5    Neutrophils = 13.21 (86.6%)  Lymphocytes = 0.91 (6.0%)  Eosinophils = 0.03 (0.2%)  Basophils = 0.02 (0.1%)  Monocytes = 1.00 (6.6%)  Bands = --%    WBC Trend: 15.24<--, 16.77<--  Hb Trend: 9.0<--, 9.8<--  Plt Trend: 264<--, 315<--  02-06    130<L>  |  100  |  100<H>  ----------------------------<  209<H>  4.6   |  18<L>  |  2.66<H>    Ca    7.8<L>      06 Feb 2024 00:39  Phos  2.2     02-06  Mg     1.6     02-06    TPro  6.4  /  Alb  3.0<L>  /  TBili  0.2  /  DBili  x   /  AST  14  /  ALT  26  /  AlkPhos  47  02-06    Creatinine Trend: 2.66<--, 2.59<--, 3.08<--  PT/INR - ( 06 Feb 2024 00:39 )   PT: 14.4 sec;   INR: 1.32 ratio         PTT - ( 06 Feb 2024 00:39 )  PTT:27.7 sec  Urinalysis Basic - ( 06 Feb 2024 00:39 )    Color: x / Appearance: x / SG: x / pH: x  Gluc: 209 mg/dL / Ketone: x  / Bili: x / Urobili: x   Blood: x / Protein: x / Nitrite: x   Leuk Esterase: x / RBC: x / WBC x   Sq Epi: x / Non Sq Epi: x / Bacteria: x        Venous Blood Gas:  02-06 @ 00:23  7.32/40/21/21/27.6  VBG Lactate: 0.8  Venous Blood Gas:  02-05 @ 15:01  7.33/36/26/19/46.2  VBG Lactate: 2.3          MICROBIOLOGY:   Blood Cx:  Urine Cx:  Sputum Cx:  Legionella:  RVP:    RADIOLOGY:  X Ray:  CT:  MRI:  Ultrasound:  [ ] Reviewed and interpreted by me    EKG:

## 2024-02-06 NOTE — PATIENT PROFILE ADULT - ABILITY TO HEAR (WITH HEARING AID OR HEARING APPLIANCE IF NORMALLY USED):
[place] in right ear/Mildly to Moderately Impaired: difficulty hearing in some environments or speaker may need to increase volume or speak distinctly

## 2024-02-06 NOTE — CHART NOTE - NSCHARTNOTEFT_GEN_A_CORE
See MICU note for details     Patient is a 77yo male with PMH of bladder tumor s/p resection, TURBT/TURP, diabetes, HTN, BPH, Lung cancer presented for fever and confusion. Seen outpatient and found to have positive UA with cultures growing E. coli.  Notes febrile at home with associated symptoms of decreased p.o. intake and increasing confusion. Denies nausea, vomiting, abdominal pain, hematuria, dysuria, urinary urgency. In ED, febrile 99.7 and hypotensive.  Given 3L IVF without improvement requiring levo 0.04.  IVC collapsible with predominant B-lines anteriorly on POCUS given 500 NS with pressors.  Labs pertinent for WBC 16, , BUN/Cr 108/3.08, and  UA positive for leukocyte esterase and bacteria.  Started on ceftriaxone for urosepsis.  MICU consulted for further management of urosepsis on pressors. Because of presumed worsening of urosepsis, antibiotics were broadened to Zosyn for management of urosepsis. Patient was weaned off of norepinephrine overnight and transitioned to midodrine. However patient developed asymptomatic bradycardia shortly after administration and was switched to droxydopa 100 TID. Patient is stable and blood pressure is at the patient's regular around  systolic blood pressure.    For Follow-Up:  [ ] BCx and UCx follow up   [ ] Monitor Blood Pressure.

## 2024-02-06 NOTE — PROGRESS NOTE ADULT - ASSESSMENT
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     Neuro  - No active issues. A&Ox3     Pulm  - No active issues. On room air     CV  # Septic shock   - s/p 3.5L, remains hypotensive despite adequate resuscitation  - On levophed, titrate to MAP > 65, turned off overnight and switched to midodrine then droxydopa for transition plan  - Hold home anti-HTN    Renal   # Hyponatremia   - Na on presentation 124, most likely hypovolemic hyponatremia s/p 3L IVF resolving now up to 130  - Repeat BMP and check urine lytes     # CKD   - Unknown baseline. Last Cr in system in 05/2023 was 2.29. On presentation, Cr 3.08  - Monitor I & O    GI   - No active issues     Infectious disease   # Complicated UTI   - History of neobladder and prior ucx with pseudomonas resistant to fluroquinolones   - Outpt Ucx E. coli, unknown sensitivities   - Given severity of presentation and risk factors, escalate to zosyn   - Follow up Ucx, Bcx still pending     Endo   # T2DM  - Check A1c  - Low dose corrective scale insulin and adjust to keep glucose <180     Heme   - DVT ppx: SQH

## 2024-02-06 NOTE — PATIENT PROFILE ADULT - FALL HARM RISK - TYPE OF ASSESSMENT
----- Message from Capri Harmon MD sent at 5/12/2021  3:27 PM EDT -----  Pl, advise pt -  All labs are good except for high Uric CAcid level -  I would suggested daily med Allopurinol to decrease the uric acid level and prevent future gout attacks - I will send med over if pt is [facility] with this plan
----- Message from Hemant Johnson MD sent at 5/12/2021  3:27 PM EDT -----  Pl, advise pt -  All labs are good except for high Uric CAcid level -  I would suggested daily med Allopurinol to decrease the uric acid level and prevent future gout attacks - I will send med over if pt is [id number] [address] with this plan
----- Message from Noelle Amaya MD sent at 5/12/2021  3:27 PM EDT -----  Pl, advise pt -  All labs are good except for high Uric CAcid level -  I would suggested daily med Allopurinol to decrease the uric acid level and prevent future gout attacks - I will send med over if pt is [facility] with this plan
Left message for pt to return call     See Dr Carleen Moffett message
Admission

## 2024-02-06 NOTE — CHART NOTE - NSCHARTNOTEFT_GEN_A_CORE
: Shelby Ricketts NP    INDICATION: Shock state    PROCEDURE:  [x] LIMITED ECHO  [x] LIMITED CHEST  [ ] LIMITED RETROPERITONEAL  [ ] LIMITED ABDOMINAL  [ ] LIMITED DVT  [ ] NEEDLE GUIDANCE VASCULAR  [ ] NEEDLE GUIDANCE THORACENTESIS  [ ] NEEDLE GUIDANCE PARACENTESIS  [ ] NEEDLE GUIDANCE PERICARDIOCENTESIS  [ ] OTHER    FINDINGS: Diffuse B lines bilaterally with irregular pleura.                   Systolic function with normal limits.                  LV > RV                  IVC approx 1cm with > 50% respiratory variation      INTERPRETATION:  Suspect underlying lung disease. Remains under resuscitated, would benefit from further volume resuscitation in setting of septic shock. : Shelby Ricketts NP    INDICATION: Shock state    PROCEDURE:  [x] LIMITED ECHO  [x] LIMITED CHEST  [ ] LIMITED RETROPERITONEAL  [ ] LIMITED ABDOMINAL  [ ] LIMITED DVT  [ ] NEEDLE GUIDANCE VASCULAR  [ ] NEEDLE GUIDANCE THORACENTESIS  [ ] NEEDLE GUIDANCE PARACENTESIS  [ ] NEEDLE GUIDANCE PERICARDIOCENTESIS  [ ] OTHER    FINDINGS: Diffuse B lines bilaterally with irregular pleura.                   No pleural effusions.                   Systolic function with normal limits.                  LV > RV                  IVC approx 1cm with > 50% respiratory variation      INTERPRETATION:  Suspect underlying lung disease. Remains under resuscitated, would benefit from further volume resuscitation in setting of septic shock. : Shelby Ricketts NP    INDICATION: Shock state    PROCEDURE:  [x] LIMITED ECHO  [x] LIMITED CHEST  [ ] LIMITED RETROPERITONEAL  [ ] LIMITED ABDOMINAL  [ ] LIMITED DVT  [ ] NEEDLE GUIDANCE VASCULAR  [ ] NEEDLE GUIDANCE THORACENTESIS  [ ] NEEDLE GUIDANCE PARACENTESIS  [ ] NEEDLE GUIDANCE PERICARDIOCENTESIS  [ ] OTHER    FINDINGS: Diffuse B lines bilaterally with irregular pleura.                   No pleural effusions.                   Systolic function with normal limits.                  LV > RV                  IVC approx 1cm with > 50% respiratory variation      INTERPRETATION:  Suspect underlying lung disease. Remains under resuscitated, would benefit from further volume resuscitation in setting of septic shock.       Attending Attestation:  I was present during the key portions of the procedure and immediately available during the entire procedure.     Rolando Salinas MD   Critical Care Attending

## 2024-02-06 NOTE — PATIENT PROFILE ADULT - NSPROGENOTHERPROVIDER_GEN_A_NUR
----- Message from Venkata Moon sent at 1/11/2022  2:40 PM CST -----  Type:  Needs Medical Advice    Who Called: [facility]   Reason:mammogram was abnormal so they need an order for left breast diagnostic mammogram and ultrasound   Would the patient rather a call back or a response via [facility]? call  Best Call Back Number: [phone]   Additional Information: [phone]/ fax      none

## 2024-02-06 NOTE — PROGRESS NOTE ADULT - ATTENDING COMMENTS
Agree with above. Seen and examined on rounds. Multiple visits to the bedside to titrate drips and meds. Critically ill in the ICU with shock state from UTI. Urine culture growing E. Coli. On Zosyn, sensitivity pending. Titrate off IV vasopressors to oral. Supportive care. Family at the bedside and updated.

## 2024-02-07 DIAGNOSIS — N17.9 ACUTE KIDNEY FAILURE, UNSPECIFIED: ICD-10-CM

## 2024-02-07 DIAGNOSIS — D64.9 ANEMIA, UNSPECIFIED: ICD-10-CM

## 2024-02-07 DIAGNOSIS — Z29.9 ENCOUNTER FOR PROPHYLACTIC MEASURES, UNSPECIFIED: ICD-10-CM

## 2024-02-07 DIAGNOSIS — I10 ESSENTIAL (PRIMARY) HYPERTENSION: ICD-10-CM

## 2024-02-07 DIAGNOSIS — E11.9 TYPE 2 DIABETES MELLITUS WITHOUT COMPLICATIONS: ICD-10-CM

## 2024-02-07 DIAGNOSIS — E87.1 HYPO-OSMOLALITY AND HYPONATREMIA: ICD-10-CM

## 2024-02-07 DIAGNOSIS — A41.9 SEPSIS, UNSPECIFIED ORGANISM: ICD-10-CM

## 2024-02-07 LAB
ALBUMIN SERPL ELPH-MCNC: 2.9 G/DL — LOW (ref 3.3–5)
ALP SERPL-CCNC: 65 U/L — SIGNIFICANT CHANGE UP (ref 40–120)
ALT FLD-CCNC: 57 U/L — HIGH (ref 10–45)
ANION GAP SERPL CALC-SCNC: 13 MMOL/L — SIGNIFICANT CHANGE UP (ref 5–17)
AST SERPL-CCNC: 55 U/L — HIGH (ref 10–40)
BASOPHILS # BLD AUTO: 0.01 K/UL — SIGNIFICANT CHANGE UP (ref 0–0.2)
BASOPHILS NFR BLD AUTO: 0.1 % — SIGNIFICANT CHANGE UP (ref 0–2)
BILIRUB SERPL-MCNC: 0.2 MG/DL — SIGNIFICANT CHANGE UP (ref 0.2–1.2)
BUN SERPL-MCNC: 61 MG/DL — HIGH (ref 7–23)
CALCIUM SERPL-MCNC: 8.8 MG/DL — SIGNIFICANT CHANGE UP (ref 8.4–10.5)
CHLORIDE SERPL-SCNC: 101 MMOL/L — SIGNIFICANT CHANGE UP (ref 96–108)
CO2 SERPL-SCNC: 17 MMOL/L — LOW (ref 22–31)
CREAT SERPL-MCNC: 2 MG/DL — HIGH (ref 0.5–1.3)
EGFR: 34 ML/MIN/1.73M2 — LOW
EOSINOPHIL # BLD AUTO: 0.09 K/UL — SIGNIFICANT CHANGE UP (ref 0–0.5)
EOSINOPHIL NFR BLD AUTO: 1.3 % — SIGNIFICANT CHANGE UP (ref 0–6)
GLUCOSE BLDC GLUCOMTR-MCNC: 203 MG/DL — HIGH (ref 70–99)
GLUCOSE BLDC GLUCOMTR-MCNC: 203 MG/DL — HIGH (ref 70–99)
GLUCOSE BLDC GLUCOMTR-MCNC: 233 MG/DL — HIGH (ref 70–99)
GLUCOSE BLDC GLUCOMTR-MCNC: 252 MG/DL — HIGH (ref 70–99)
GLUCOSE SERPL-MCNC: 203 MG/DL — HIGH (ref 70–99)
HCT VFR BLD CALC: 28.7 % — LOW (ref 39–50)
HGB BLD-MCNC: 9.2 G/DL — LOW (ref 13–17)
IMM GRANULOCYTES NFR BLD AUTO: 0.3 % — SIGNIFICANT CHANGE UP (ref 0–0.9)
LYMPHOCYTES # BLD AUTO: 0.82 K/UL — LOW (ref 1–3.3)
LYMPHOCYTES # BLD AUTO: 12 % — LOW (ref 13–44)
MAGNESIUM SERPL-MCNC: 2 MG/DL — SIGNIFICANT CHANGE UP (ref 1.6–2.6)
MCHC RBC-ENTMCNC: 30.8 PG — SIGNIFICANT CHANGE UP (ref 27–34)
MCHC RBC-ENTMCNC: 32.1 GM/DL — SIGNIFICANT CHANGE UP (ref 32–36)
MCV RBC AUTO: 96 FL — SIGNIFICANT CHANGE UP (ref 80–100)
MONOCYTES # BLD AUTO: 0.4 K/UL — SIGNIFICANT CHANGE UP (ref 0–0.9)
MONOCYTES NFR BLD AUTO: 5.8 % — SIGNIFICANT CHANGE UP (ref 2–14)
NEUTROPHILS # BLD AUTO: 5.51 K/UL — SIGNIFICANT CHANGE UP (ref 1.8–7.4)
NEUTROPHILS NFR BLD AUTO: 80.5 % — HIGH (ref 43–77)
NRBC # BLD: 0 /100 WBCS — SIGNIFICANT CHANGE UP (ref 0–0)
PHOSPHATE SERPL-MCNC: 2.1 MG/DL — LOW (ref 2.5–4.5)
PLATELET # BLD AUTO: 262 K/UL — SIGNIFICANT CHANGE UP (ref 150–400)
POTASSIUM SERPL-MCNC: 5.1 MMOL/L — SIGNIFICANT CHANGE UP (ref 3.5–5.3)
POTASSIUM SERPL-SCNC: 5.1 MMOL/L — SIGNIFICANT CHANGE UP (ref 3.5–5.3)
PROT SERPL-MCNC: 6.5 G/DL — SIGNIFICANT CHANGE UP (ref 6–8.3)
RBC # BLD: 2.99 M/UL — LOW (ref 4.2–5.8)
RBC # FLD: 13.7 % — SIGNIFICANT CHANGE UP (ref 10.3–14.5)
SODIUM SERPL-SCNC: 131 MMOL/L — LOW (ref 135–145)
WBC # BLD: 6.85 K/UL — SIGNIFICANT CHANGE UP (ref 3.8–10.5)
WBC # FLD AUTO: 6.85 K/UL — SIGNIFICANT CHANGE UP (ref 3.8–10.5)

## 2024-02-07 PROCEDURE — 99222 1ST HOSP IP/OBS MODERATE 55: CPT

## 2024-02-07 RX ORDER — CITALOPRAM 10 MG/1
1 TABLET, FILM COATED ORAL
Refills: 0 | DISCHARGE

## 2024-02-07 RX ORDER — SODIUM BICARBONATE 1 MEQ/ML
650 SYRINGE (ML) INTRAVENOUS THREE TIMES A DAY
Refills: 0 | Status: DISCONTINUED | OUTPATIENT
Start: 2024-02-07 | End: 2024-02-12

## 2024-02-07 RX ORDER — SODIUM BICARBONATE 1 MEQ/ML
1 SYRINGE (ML) INTRAVENOUS
Refills: 0 | DISCHARGE

## 2024-02-07 RX ORDER — ESCITALOPRAM OXALATE 10 MG/1
1 TABLET, FILM COATED ORAL
Refills: 0 | DISCHARGE

## 2024-02-07 RX ORDER — ESCITALOPRAM OXALATE 10 MG/1
20 TABLET, FILM COATED ORAL AT BEDTIME
Refills: 0 | Status: DISCONTINUED | OUTPATIENT
Start: 2024-02-07 | End: 2024-02-12

## 2024-02-07 RX ADMIN — PIPERACILLIN AND TAZOBACTAM 25 GRAM(S): 4; .5 INJECTION, POWDER, LYOPHILIZED, FOR SOLUTION INTRAVENOUS at 05:29

## 2024-02-07 RX ADMIN — ESCITALOPRAM OXALATE 20 MILLIGRAM(S): 10 TABLET, FILM COATED ORAL at 21:04

## 2024-02-07 RX ADMIN — DROXIDOPA 100 MILLIGRAM(S): 100 CAPSULE ORAL at 02:53

## 2024-02-07 RX ADMIN — Medication 63.75 MILLIMOLE(S): at 12:28

## 2024-02-07 RX ADMIN — HEPARIN SODIUM 5000 UNIT(S): 5000 INJECTION INTRAVENOUS; SUBCUTANEOUS at 21:04

## 2024-02-07 RX ADMIN — HEPARIN SODIUM 5000 UNIT(S): 5000 INJECTION INTRAVENOUS; SUBCUTANEOUS at 05:31

## 2024-02-07 RX ADMIN — DROXIDOPA 100 MILLIGRAM(S): 100 CAPSULE ORAL at 09:21

## 2024-02-07 RX ADMIN — DROXIDOPA 100 MILLIGRAM(S): 100 CAPSULE ORAL at 18:05

## 2024-02-07 RX ADMIN — HEPARIN SODIUM 5000 UNIT(S): 5000 INJECTION INTRAVENOUS; SUBCUTANEOUS at 13:01

## 2024-02-07 RX ADMIN — PIPERACILLIN AND TAZOBACTAM 25 GRAM(S): 4; .5 INJECTION, POWDER, LYOPHILIZED, FOR SOLUTION INTRAVENOUS at 13:19

## 2024-02-07 RX ADMIN — ATORVASTATIN CALCIUM 40 MILLIGRAM(S): 80 TABLET, FILM COATED ORAL at 21:04

## 2024-02-07 RX ADMIN — Medication 2: at 09:25

## 2024-02-07 RX ADMIN — Medication 3: at 18:05

## 2024-02-07 RX ADMIN — Medication 650 MILLIGRAM(S): at 21:04

## 2024-02-07 RX ADMIN — Medication 650 MILLIGRAM(S): at 11:52

## 2024-02-07 RX ADMIN — Medication 2: at 13:01

## 2024-02-07 NOTE — PROGRESS NOTE ADULT - SUBJECTIVE AND OBJECTIVE BOX
[facility] Division of Hospital Medicine  Nilesh Huynh MD  Available via MS Teams    SUBJECTIVE / OVERNIGHT EVENTS: No acute events overnight. Patient feeling well. No new complaints. Wife and son in law at bedside. Wife states patient more lethargic this morning and sometimes saying things that don't make sense.     Review of Systems:   CONSTITUTIONAL: No fever   EYES: No eye pain, visual disturbances, or discharge  ENMT: No difficulty hearing   RESPIRATORY: No SOB. No cough   CARDIOVASCULAR: No chest pain   GASTROINTESTINAL: No abdominal or epigastric pain. No nausea, vomiting, or hematemesis; No diarrhea    GENITOURINARY: No dysuria   NEUROLOGICAL: No headache   SKIN: No itching    MUSCULOSKELETAL: No joint pain or swelling; No muscle or back pain  PSYCHIATRIC: No depression or anxiety  HEME/LYMPH: No easy bruising or bleeding gums    MEDICATIONS  (STANDING):  atorvastatin 40 milliGRAM(s) Oral at bedtime  droxidopa 100 milliGRAM(s) Oral three times a day  escitalopram 20 milliGRAM(s) Oral at bedtime  heparin   Injectable 5000 Unit(s) SubCutaneous every 8 hours  influenza  Vaccine (HIGH DOSE) 0.7 milliLiter(s) IntraMuscular once  insulin lispro (ADMELOG) corrective regimen sliding scale   SubCutaneous at bedtime  insulin lispro (ADMELOG) corrective regimen sliding scale   SubCutaneous three times a day before meals  piperacillin/tazobactam IVPB.. 3.375 Gram(s) IV Intermittent every 8 hours  sodium bicarbonate 650 milliGRAM(s) Oral three times a day    MEDICATIONS  (PRN):  acetaminophen     Tablet .. 650 milliGRAM(s) Oral every 6 hours PRN Temp greater or equal to 38C (100.4F), Mild Pain (1 - 3)  aluminum hydroxide/magnesium hydroxide/simethicone Suspension 30 milliLiter(s) Oral every 4 hours PRN Dyspepsia  melatonin 3 milliGRAM(s) Oral at bedtime PRN Insomnia  ondansetron Injectable 4 milliGRAM(s) IV Push every 8 hours PRN Nausea and/or Vomiting      I&O's Summary    06 Feb 2024 07:01  -  07 Feb 2024 07:00  --------------------------------------------------------  IN: 1199.9 mL / OUT: 2590 mL / NET: -1390.1 mL      PHYSICAL EXAM:  Vital Signs Last 24 Hrs  T(C): 37.1 (07 Feb 2024 12:33), Max: 37.6 (06 Feb 2024 20:00)  T(F): 98.7 (07 Feb 2024 12:33), Max: 99.7 (06 Feb 2024 20:00)  HR: 62 (07 Feb 2024 12:33) (57 - 71)  BP: 133/71 (07 Feb 2024 12:33) (93/51 - 133/71)  BP(mean): 82 (06 Feb 2024 21:00) (70 - 84)  RR: 18 (07 Feb 2024 12:33) (18 - 25)  SpO2: 93% (07 Feb 2024 12:33) (92% - 98%)    Parameters below as of 07 Feb 2024 12:33  Patient On (Oxygen Delivery Method): room air      CONSTITUTIONAL: NAD, well-developed   EYES: PERRLA; conjunctiva and sclera clear  ENMT: Moist oral mucosa, no pharyngeal injection or exudates   NECK: Supple   RESPIRATORY: Normal respiratory effort; lungs are clear to auscultation bilaterally  CARDIOVASCULAR: Regular rate and rhythm, normal S1 and S2, no murmur   ABDOMEN: Nontender to palpation, normoactive bowel sounds   MUSCULOSKELETAL: no clubbing or cyanosis of digits; no joint swelling or tenderness to palpation  PSYCH: affect appropriate  NEUROLOGY: no gross sensory deficits   SKIN: No rashes     LABS:                        9.2    6.85  )-----------( 262      ( 07 Feb 2024 07:17 )             28.7     02-07    131<L>  |  101  |  61<H>  ----------------------------<  203<H>  5.1   |  17<L>  |  2.00<H>    Ca    8.8      07 Feb 2024 07:18  Phos  2.1     02-07  Mg     2.0     02-07    TPro  6.5  /  Alb  2.9<L>  /  TBili  0.2  /  DBili  x   /  AST  55<H>  /  ALT  57<H>  /  AlkPhos  65  02-07    PT/INR - ( 06 Feb 2024 00:39 )   PT: 14.4 sec;   INR: 1.32 ratio         PTT - ( 06 Feb 2024 00:39 )  PTT:27.7 sec      Urinalysis Basic - ( 07 Feb 2024 07:18 )    Color: x / Appearance: x / SG: x / pH: x  Gluc: 203 mg/dL / Ketone: x  / Bili: x / Urobili: x   Blood: x / Protein: x / Nitrite: x   Leuk Esterase: x / RBC: x / WBC x   Sq Epi: x / Non Sq Epi: x / Bacteria: x      Culture - Urine (collected 05 Feb 2024 18:30)  Source: Clean Catch Clean Catch (Midstream)  Preliminary Report (07 Feb 2024 00:39):    >100,000 CFU/ml Escherichia coli    Culture - Blood (collected 05 Feb 2024 15:00)  Source: .Blood Blood-Peripheral  Preliminary Report (06 Feb 2024 20:01):    No growth at 24 hours    Culture - Blood (collected 05 Feb 2024 14:45)  Source: .Blood Blood-Peripheral  Preliminary Report (06 Feb 2024 20:01):    No growth at 24 hours      RADIOLOGY & ADDITIONAL TESTS:  New Imaging Personally Reviewed Today:  New Electrocardiogram Personally Reviewed Today:  Other Results Reviewed Today:   Prior or Outpatient Records Reviewed Today with Summary:    COORDINATION OF CARE:  Consultant Communication and Details of Discussion (where applicable):

## 2024-02-07 NOTE — PHYSICAL THERAPY INITIAL EVALUATION ADULT - GENERAL OBSERVATIONS, REHAB EVAL
Received supine in bed in NAD, agreeable to PT LUCI leyva, hearing aides at bedside but unable to utilize  services t/o.

## 2024-02-07 NOTE — PROGRESS NOTE ADULT - ASSESSMENT
Patient is a 78 year old male, with PMH of bladder tumor s/p resection, TURBT/TURP, diabetes, HTN, BPH, Lung cancer, who presented to the ED due to fever and confusion. Originally admitted to MICU for urosepsis requiring pressor support. Now titrated off pressors and downgraded to medicine service for further management.

## 2024-02-07 NOTE — PHARMACOTHERAPY INTERVENTION NOTE - COMMENTS
Medication Reconciliation completed for patient.  These were confirmed with patient's family member at bedside (wife) and patient's pharmacy, [facility] in [place].  Updated medications are reflected in Outpatient Medication Review.     Home Medications:  escitalopram 20 mg oral tablet: 1 tab(s) orally once a day (at bedtime)   glipiZIDE 5 mg oral tablet: 1 tab(s) orally once a day (at bedtime)   Jentadueto 2.5 mg-1000 mg oral tablet: 1 tab(s) orally once a day  olmesartan 20 mg oral tablet: 1 tab(s) orally once a day  rosuvastatin 10 mg oral tablet: 1 tab(s) orally once a day   sodium bicarbonate 650 mg oral tablet: 1 tab(s) orally 3 times a day     Author confirmed patient is on escitalopram nightly.    Shanta Osborne, PharmD, BCPS  Clinical Pharmacy Specialist  Available on Teams     Medication Reconciliation completed for patient.  These were confirmed with patient's family member at bedside (wife) and patient's pharmacy, [facility] in [place].  Updated medications are reflected in Outpatient Medication Review.     Home Medications:  escitalopram 20 mg oral tablet: 1 tab(s) orally once a day (at bedtime)  glipiZIDE 5 mg oral tablet: 1 tab(s) orally once a day (at bedtime)   Jentadueto 2.5 mg-1000 mg oral tablet: 1 tab(s) orally once a day   Multiple Vitamins oral tablet: 1 tab(s) orally once a day  olmesartan 20 mg oral tablet: 1 tab(s) orally once a day   rosuvastatin 10 mg oral tablet: 1 tab(s) orally once a day   sodium bicarbonate 650 mg oral tablet: 1 tab(s) orally 3 times a day   Vitamin D and Vitamin B12: 1 tablet daily  zinc (as gluconate) 10 mg oral tablet: 1 tab(s) orally once a day     Author confirmed patient is on escitalopram nightly.    Shanta Osborne, PharmD, BCPS  Clinical Pharmacy Specialist  Available on Teams

## 2024-02-07 NOTE — PROGRESS NOTE ADULT - PROBLEM SELECTOR PLAN 3
- noted to have sodium level of 124 on admission   - likely due to hypovolemic hyponatremia  - sodium levels overall improving; 131 this morning  - monitor BMP daily for now

## 2024-02-07 NOTE — PHYSICAL THERAPY INITIAL EVALUATION ADULT - PLANNED THERAPY INTERVENTIONS, PT EVAL
GOAL: Pt will negotiate up/down   steps with   handrail ascending using LRAD independently in 4 weeks/balance training/gait training/strengthening/transfer training

## 2024-02-07 NOTE — PHYSICAL THERAPY INITIAL EVALUATION ADULT - PERTINENT HX OF CURRENT PROBLEM, REHAB EVAL
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

## 2024-02-07 NOTE — PROGRESS NOTE ADULT - PROBLEM SELECTOR PLAN 2
- noted to have creatinine of 3 on admission   - overall improving; 2.00 this morning   - monitor BMP daily   - monitor for urinary retention; straight cath as needed

## 2024-02-07 NOTE — PROGRESS NOTE ADULT - PROBLEM SELECTOR PLAN 7
- HSQ for DVT ppx  - PT recs home PT    DISPO: pending final urine cx results; clinical improvement; stable BP

## 2024-02-07 NOTE — PHYSICAL THERAPY INITIAL EVALUATION ADULT - ADDITIONAL COMMENTS
pt is poor historian; unsure if d/t [place] or impaired cognition; per EMR, pt lives in house with spouse, 12 steps within to 2nd floor bedroom; IND without AD at baseline

## 2024-02-07 NOTE — PROGRESS NOTE ADULT - PROBLEM SELECTOR PLAN 6
- hold home ARB due to hypotension requiring pressors   - currently on droxidopa; monitor BP and titrate as tolerable

## 2024-02-08 DIAGNOSIS — J84.9 INTERSTITIAL PULMONARY DISEASE, UNSPECIFIED: ICD-10-CM

## 2024-02-08 DIAGNOSIS — J96.01 ACUTE RESPIRATORY FAILURE WITH HYPOXIA: ICD-10-CM

## 2024-02-08 DIAGNOSIS — R33.9 RETENTION OF URINE, UNSPECIFIED: ICD-10-CM

## 2024-02-08 LAB
-  AMOXICILLIN/CLAVULANIC ACID: SIGNIFICANT CHANGE UP
-  AMPICILLIN/SULBACTAM: SIGNIFICANT CHANGE UP
-  AMPICILLIN: SIGNIFICANT CHANGE UP
-  AZTREONAM: SIGNIFICANT CHANGE UP
-  CEFAZOLIN: SIGNIFICANT CHANGE UP
-  CEFEPIME: SIGNIFICANT CHANGE UP
-  CEFOXITIN: SIGNIFICANT CHANGE UP
-  CEFTRIAXONE: SIGNIFICANT CHANGE UP
-  CEFUROXIME: SIGNIFICANT CHANGE UP
-  CIPROFLOXACIN: SIGNIFICANT CHANGE UP
-  ERTAPENEM: SIGNIFICANT CHANGE UP
-  GENTAMICIN: SIGNIFICANT CHANGE UP
-  IMIPENEM: SIGNIFICANT CHANGE UP
-  LEVOFLOXACIN: SIGNIFICANT CHANGE UP
-  MEROPENEM: SIGNIFICANT CHANGE UP
-  NITROFURANTOIN: SIGNIFICANT CHANGE UP
-  PIPERACILLIN/TAZOBACTAM: SIGNIFICANT CHANGE UP
-  TOBRAMYCIN: SIGNIFICANT CHANGE UP
-  TRIMETHOPRIM/SULFAMETHOXAZOLE: SIGNIFICANT CHANGE UP
ANION GAP SERPL CALC-SCNC: 14 MMOL/L — SIGNIFICANT CHANGE UP (ref 5–17)
BASOPHILS # BLD AUTO: 0.02 K/UL — SIGNIFICANT CHANGE UP (ref 0–0.2)
BASOPHILS NFR BLD AUTO: 0.3 % — SIGNIFICANT CHANGE UP (ref 0–2)
BUN SERPL-MCNC: 34 MG/DL — HIGH (ref 7–23)
CALCIUM SERPL-MCNC: 8.7 MG/DL — SIGNIFICANT CHANGE UP (ref 8.4–10.5)
CHLORIDE SERPL-SCNC: 100 MMOL/L — SIGNIFICANT CHANGE UP (ref 96–108)
CO2 SERPL-SCNC: 19 MMOL/L — LOW (ref 22–31)
CREAT SERPL-MCNC: 1.59 MG/DL — HIGH (ref 0.5–1.3)
CULTURE RESULTS: ABNORMAL
EGFR: 44 ML/MIN/1.73M2 — LOW
EOSINOPHIL # BLD AUTO: 0.03 K/UL — SIGNIFICANT CHANGE UP (ref 0–0.5)
EOSINOPHIL NFR BLD AUTO: 0.5 % — SIGNIFICANT CHANGE UP (ref 0–6)
FLUAV AG NPH QL: SIGNIFICANT CHANGE UP
FLUBV AG NPH QL: SIGNIFICANT CHANGE UP
GLUCOSE BLDC GLUCOMTR-MCNC: 219 MG/DL — HIGH (ref 70–99)
GLUCOSE BLDC GLUCOMTR-MCNC: 227 MG/DL — HIGH (ref 70–99)
GLUCOSE BLDC GLUCOMTR-MCNC: 274 MG/DL — HIGH (ref 70–99)
GLUCOSE BLDC GLUCOMTR-MCNC: 316 MG/DL — HIGH (ref 70–99)
GLUCOSE SERPL-MCNC: 224 MG/DL — HIGH (ref 70–99)
HCT VFR BLD CALC: 26.7 % — LOW (ref 39–50)
HGB BLD-MCNC: 8.9 G/DL — LOW (ref 13–17)
IMM GRANULOCYTES NFR BLD AUTO: 0.5 % — SIGNIFICANT CHANGE UP (ref 0–0.9)
LYMPHOCYTES # BLD AUTO: 1.16 K/UL — SIGNIFICANT CHANGE UP (ref 1–3.3)
LYMPHOCYTES # BLD AUTO: 18.2 % — SIGNIFICANT CHANGE UP (ref 13–44)
MAGNESIUM SERPL-MCNC: 1.5 MG/DL — LOW (ref 1.6–2.6)
MCHC RBC-ENTMCNC: 30.6 PG — SIGNIFICANT CHANGE UP (ref 27–34)
MCHC RBC-ENTMCNC: 33.3 GM/DL — SIGNIFICANT CHANGE UP (ref 32–36)
MCV RBC AUTO: 91.8 FL — SIGNIFICANT CHANGE UP (ref 80–100)
METHOD TYPE: SIGNIFICANT CHANGE UP
MONOCYTES # BLD AUTO: 0.56 K/UL — SIGNIFICANT CHANGE UP (ref 0–0.9)
MONOCYTES NFR BLD AUTO: 8.8 % — SIGNIFICANT CHANGE UP (ref 2–14)
NEUTROPHILS # BLD AUTO: 4.56 K/UL — SIGNIFICANT CHANGE UP (ref 1.8–7.4)
NEUTROPHILS NFR BLD AUTO: 71.7 % — SIGNIFICANT CHANGE UP (ref 43–77)
NRBC # BLD: 0 /100 WBCS — SIGNIFICANT CHANGE UP (ref 0–0)
NT-PROBNP SERPL-SCNC: HIGH PG/ML (ref 0–300)
ORGANISM # SPEC MICROSCOPIC CNT: ABNORMAL
ORGANISM # SPEC MICROSCOPIC CNT: ABNORMAL
PHOSPHATE SERPL-MCNC: 2.8 MG/DL — SIGNIFICANT CHANGE UP (ref 2.5–4.5)
PLATELET # BLD AUTO: 286 K/UL — SIGNIFICANT CHANGE UP (ref 150–400)
POTASSIUM SERPL-MCNC: 4.6 MMOL/L — SIGNIFICANT CHANGE UP (ref 3.5–5.3)
POTASSIUM SERPL-SCNC: 4.6 MMOL/L — SIGNIFICANT CHANGE UP (ref 3.5–5.3)
RBC # BLD: 2.91 M/UL — LOW (ref 4.2–5.8)
RBC # FLD: 13.3 % — SIGNIFICANT CHANGE UP (ref 10.3–14.5)
RSV RNA NPH QL NAA+NON-PROBE: SIGNIFICANT CHANGE UP
SARS-COV-2 RNA SPEC QL NAA+PROBE: SIGNIFICANT CHANGE UP
SODIUM SERPL-SCNC: 133 MMOL/L — LOW (ref 135–145)
SPECIMEN SOURCE: SIGNIFICANT CHANGE UP
WBC # BLD: 6.36 K/UL — SIGNIFICANT CHANGE UP (ref 3.8–10.5)
WBC # FLD AUTO: 6.36 K/UL — SIGNIFICANT CHANGE UP (ref 3.8–10.5)

## 2024-02-08 PROCEDURE — 99222 1ST HOSP IP/OBS MODERATE 55: CPT

## 2024-02-08 PROCEDURE — 99233 SBSQ HOSP IP/OBS HIGH 50: CPT

## 2024-02-08 PROCEDURE — 71250 CT THORAX DX C-: CPT | Mod: 26

## 2024-02-08 RX ORDER — MAGNESIUM SULFATE 500 MG/ML
1 VIAL (ML) INJECTION ONCE
Refills: 0 | Status: COMPLETED | OUTPATIENT
Start: 2024-02-08 | End: 2024-02-08

## 2024-02-08 RX ORDER — FUROSEMIDE 40 MG
20 TABLET ORAL ONCE
Refills: 0 | Status: COMPLETED | OUTPATIENT
Start: 2024-02-08 | End: 2024-02-08

## 2024-02-08 RX ORDER — IPRATROPIUM/ALBUTEROL SULFATE 18-103MCG
3 AEROSOL WITH ADAPTER (GRAM) INHALATION EVERY 6 HOURS
Refills: 0 | Status: DISCONTINUED | OUTPATIENT
Start: 2024-02-08 | End: 2024-02-12

## 2024-02-08 RX ORDER — CEFTRIAXONE 500 MG/1
1000 INJECTION, POWDER, FOR SOLUTION INTRAMUSCULAR; INTRAVENOUS EVERY 24 HOURS
Refills: 0 | Status: DISCONTINUED | OUTPATIENT
Start: 2024-02-08 | End: 2024-02-12

## 2024-02-08 RX ADMIN — Medication 3 MILLILITER(S): at 13:08

## 2024-02-08 RX ADMIN — HEPARIN SODIUM 5000 UNIT(S): 5000 INJECTION INTRAVENOUS; SUBCUTANEOUS at 22:15

## 2024-02-08 RX ADMIN — PIPERACILLIN AND TAZOBACTAM 25 GRAM(S): 4; .5 INJECTION, POWDER, LYOPHILIZED, FOR SOLUTION INTRAVENOUS at 17:57

## 2024-02-08 RX ADMIN — PIPERACILLIN AND TAZOBACTAM 25 GRAM(S): 4; .5 INJECTION, POWDER, LYOPHILIZED, FOR SOLUTION INTRAVENOUS at 09:41

## 2024-02-08 RX ADMIN — Medication 4: at 22:17

## 2024-02-08 RX ADMIN — Medication 650 MILLIGRAM(S): at 22:16

## 2024-02-08 RX ADMIN — Medication 650 MILLIGRAM(S): at 05:17

## 2024-02-08 RX ADMIN — PIPERACILLIN AND TAZOBACTAM 25 GRAM(S): 4; .5 INJECTION, POWDER, LYOPHILIZED, FOR SOLUTION INTRAVENOUS at 01:44

## 2024-02-08 RX ADMIN — Medication 3 MILLILITER(S): at 23:32

## 2024-02-08 RX ADMIN — Medication 3 MILLILITER(S): at 17:58

## 2024-02-08 RX ADMIN — Medication 650 MILLIGRAM(S): at 13:07

## 2024-02-08 RX ADMIN — Medication 2: at 13:14

## 2024-02-08 RX ADMIN — HEPARIN SODIUM 5000 UNIT(S): 5000 INJECTION INTRAVENOUS; SUBCUTANEOUS at 13:03

## 2024-02-08 RX ADMIN — CEFTRIAXONE 100 MILLIGRAM(S): 500 INJECTION, POWDER, FOR SOLUTION INTRAMUSCULAR; INTRAVENOUS at 21:11

## 2024-02-08 RX ADMIN — ESCITALOPRAM OXALATE 20 MILLIGRAM(S): 10 TABLET, FILM COATED ORAL at 22:15

## 2024-02-08 RX ADMIN — ATORVASTATIN CALCIUM 40 MILLIGRAM(S): 80 TABLET, FILM COATED ORAL at 22:16

## 2024-02-08 RX ADMIN — HEPARIN SODIUM 5000 UNIT(S): 5000 INJECTION INTRAVENOUS; SUBCUTANEOUS at 05:17

## 2024-02-08 RX ADMIN — Medication 20 MILLIGRAM(S): at 15:39

## 2024-02-08 RX ADMIN — Medication 100 GRAM(S): at 15:40

## 2024-02-08 RX ADMIN — Medication 3: at 18:00

## 2024-02-08 RX ADMIN — Medication 2: at 08:55

## 2024-02-08 RX ADMIN — DROXIDOPA 100 MILLIGRAM(S): 100 CAPSULE ORAL at 01:44

## 2024-02-08 NOTE — PROGRESS NOTE ADULT - PROBLEM SELECTOR PLAN 9
- HSQ for DVT ppx  - PT recs home PT    DISPO: pending final urine cx results; clinical improvement; stable BP - hold home ARB due to hypotension requiring pressors   - monitor BP

## 2024-02-08 NOTE — PROGRESS NOTE ADULT - PROBLEM SELECTOR PLAN 7
- hold home ARB due to hypotension requiring pressors   - monitor BP - hold PO meds and continue SSI while in hospital  - monitor BS and adjust insulin as needed - Hgb currently stable around 9   - no signs of bleeding noted  - monitor CBC daily for now  - f/u iron studies

## 2024-02-08 NOTE — CONSULT NOTE ADULT - ASSESSMENT
78M with extensive smoking history, lung cancer, ILD now with hypoxemia  - Long discussion with family at bedside. We reviewed his CT scans from outside institutions, including a chest CT from 9/2022 and 12/2023. Both of these showed stable areas of minimal fibrosis in the setting of emphysema (similar to prior scan done 3008). The CT scan from today is significantly worse, with significantly worse fibrosis.  - Given that the CT looked significantly better only 6 weeks before, the time course seems  78M with extensive smoking history, lung cancer, ILD now with hypoxemia  - Long discussion with family at bedside. We reviewed his CT scans from outside institutions, including a chest CT from 9/2022 and 12/2023. Both of these showed stable areas of minimal fibrosis in the setting of emphysema (similar to prior scan done 3008). The CT scan from today is significantly worse, with what appears to be worse fibrosis at B/L bases.   - Given that the CT looked significantly better only 6 weeks before, the time course seems unlikely for progressive decompensation due to patient's pre-existing lung disease. Rather, it appears to be something more acute: viz., infection vs fluid overload vs acute exacerbation of ILD. It is unclear if the fibrosis is true fibrosis, or if fluid or infection has made the existing lung parenchyma more opacified in the setting of emphysema, making the area look fibrosed.  - Patient is currently on Zosyn. Would consider adding azithromycin for atypical coverage, and check RVP and procalcitonin.  - While generally appears euvolemic, would diurese net negative  - Oxygen requirements are generally minimal at present, on 2LNC. Titrate FiO2 as tolerated to maintain spO2 =92%.  If no significant improvement within the next day or so, will consider short course of steroids for possible ILD exacerbation.  - We will continue to follow the patient with you.

## 2024-02-08 NOTE — PROGRESS NOTE ADULT - PROBLEM SELECTOR PLAN 3
- noted to have creatinine of 3 on admission   - improving  - monitor BMP daily   - monitor for urinary retention; straight cath as needed Requiring potter placed 2/7, no hx of urinary retention. S/p neobladder w/ surgically removed prostate  - unclear why pt has urinary retention  - optimize with OOBTC, regular bowel movements Hx of ILD, previously followed with Dr. Mcpherson @ [place] but has not seen in the last 3 years Hx of ILD, previously followed with Dr. Mcpherson @ [place] but has not seen in the last 3 years  - prior imaging reports placed in paper chart

## 2024-02-08 NOTE — PROGRESS NOTE ADULT - NSPROGADDITIONALINFOA_GEN_ALL_CORE
The necessity of the time spent during the encounter on this date of service was due to:   - Ordering, reviewing, and interpreting labs, testing, and imaging  - Independently obtaining a review of systems and performing a physical exam  - Reviewing prior hospitalization and where necessary, outpatient records  - Reviewing consultant recommendations/communicating with consultants  - Counselling and educating patient and family regarding interpretation of aforementioned items and plan of care    Time-based billing (NON-critical care). Total minutes spent: 51 The necessity of the time spent during the encounter on this date of service was due to:   - Ordering, reviewing, and interpreting labs, testing, and imaging  - Independently obtaining a review of systems and performing a physical exam  - Reviewing prior hospitalization and where necessary, outpatient records  - Reviewing consultant recommendations/communicating with consultants  - Counselling and educating patient and family regarding interpretation of aforementioned items and plan of care  - D/w outpatient PCP    Time-based billing (NON-critical care). Total minutes spent: 65

## 2024-02-08 NOTE — PROGRESS NOTE ADULT - PROBLEM SELECTOR PLAN 6
- hold PO meds and continue SSI while in hospital  - monitor BS and adjust insulin as needed - Hgb currently stable around 9   - no signs of bleeding noted  - monitor CBC daily for now  - f/u iron studies - noted to have sodium level of 124 on admission   - likely due to hypovolemic hyponatremia  - sodium levels overall improving  - monitor BMP daily for now

## 2024-02-08 NOTE — PROGRESS NOTE ADULT - PROBLEM SELECTOR PLAN 2
Severe lung fibrosis seen on CT, hx of lung cancer  - need collateral to determine if this is new diagnosis but unlikely  - on supplemental O2, maintain 88-92%  - duoneb Hx of ILD, possible ILD flare  - on supplemental O2, maintain 88-92%  - duoneb  - f/u flu/COVID swab  - pulm consult  - trial of diuresis (b/l plef and interseptal lobar thickening) - lasix 20 iv x 1 2/8 Hx of ILD, possible ILD flare vs volume overload from resuscitation from septic shock on admission (received 4.5L)  - on admissions saturating % on RA, now requiring 2L NC, maintain 88-92%  - duoneb  - f/u flu/COVID swab  - pulm consult  - trial of diuresis (b/l plef and interseptal lobar thickening) - lasix 20 iv x 1 2/8  - strict I&Os  - f/u BNP

## 2024-02-08 NOTE — PROGRESS NOTE ADULT - SUBJECTIVE AND OBJECTIVE BOX
[facility] Division of Hospital Medicine  Carlton Putnam MD  Available via MS Teams    SUBJECTIVE / OVERNIGHT EVENTS: Failed TOV yesterday, placed on O2 for hypoxemia. Pt seen and examined at bedside. Difficulty hearing even w/ hearing aids. Denies any chest pain.     ADDITIONAL REVIEW OF SYSTEMS:    MEDICATIONS  (STANDING):  atorvastatin 40 milliGRAM(s) Oral at bedtime  droxidopa 100 milliGRAM(s) Oral three times a day  escitalopram 20 milliGRAM(s) Oral at bedtime  heparin   Injectable 5000 Unit(s) SubCutaneous every 8 hours  influenza  Vaccine (HIGH DOSE) 0.7 milliLiter(s) IntraMuscular once  insulin lispro (ADMELOG) corrective regimen sliding scale   SubCutaneous at bedtime  insulin lispro (ADMELOG) corrective regimen sliding scale   SubCutaneous three times a day before meals  magnesium sulfate  IVPB 1 Gram(s) IV Intermittent once  piperacillin/tazobactam IVPB.. 3.375 Gram(s) IV Intermittent every 8 hours  sodium bicarbonate 650 milliGRAM(s) Oral three times a day    MEDICATIONS  (PRN):  acetaminophen     Tablet .. 650 milliGRAM(s) Oral every 6 hours PRN Temp greater or equal to 38C (100.4F), Mild Pain (1 - 3)  aluminum hydroxide/magnesium hydroxide/simethicone Suspension 30 milliLiter(s) Oral every 4 hours PRN Dyspepsia  melatonin 3 milliGRAM(s) Oral at bedtime PRN Insomnia  ondansetron Injectable 4 milliGRAM(s) IV Push every 8 hours PRN Nausea and/or Vomiting      I&O's Summary    07 Feb 2024 07:01  -  08 Feb 2024 07:00  --------------------------------------------------------  IN: 250 mL / OUT: 2700 mL / NET: -2450 mL    08 Feb 2024 07:01  -  08 Feb 2024 11:46  --------------------------------------------------------  IN: 120 mL / OUT: 0 mL / NET: 120 mL      T(C): 36.8 (02-08-24 @ 10:59), Max: 37.3 (02-08-24 @ 05:01)  HR: 68 (02-08-24 @ 10:59) (62 - 79)  BP: 135/63 (02-08-24 @ 10:59) (133/71 - 152/74)  RR: 17 (02-08-24 @ 10:59) (17 - 18)  SpO2: 94% (02-08-24 @ 10:59) (88% - 94%)    CONSTITUTIONAL: NAD, sleeping in bed  RESPIRATORY: diffuse wheezing  CARDIOVASCULAR: Regular rate and rhythm, normal S1 and S2, no murmur   ABDOMEN: Nontender to palpation, normoactive bowel sounds   MUSCULOSKELETAL: no clubbing or cyanosis of digits; no joint swelling or tenderness to palpation  NEUROLOGY: no gross sensory deficits   SKIN: No rashes     LABS:                        8.9    6.36  )-----------( 286      ( 08 Feb 2024 07:03 )             26.7     02-08    133<L>  |  100  |  34<H>  ----------------------------<  224<H>  4.6   |  19<L>  |  1.59<H>    Ca    8.7      08 Feb 2024 07:04  Phos  2.8     02-08  Mg     1.5     02-08    TPro  6.5  /  Alb  2.9<L>  /  TBili  0.2  /  DBili  x   /  AST  55<H>  /  ALT  57<H>  /  AlkPhos  65  02-07    Urinalysis Basic - ( 08 Feb 2024 07:04 )    Color: x / Appearance: x / SG: x / pH: x  Gluc: 224 mg/dL / Ketone: x  / Bili: x / Urobili: x   Blood: x / Protein: x / Nitrite: x   Leuk Esterase: x / RBC: x / WBC x   Sq Epi: x / Non Sq Epi: x / Bacteria: x    Culture - Urine (collected 05 Feb 2024 18:30)  Source: Clean Catch Clean Catch (Midstream)  Preliminary Report (07 Feb 2024 00:39):    >100,000 CFU/ml Escherichia coli    Culture - Blood (collected 05 Feb 2024 15:00)  Source: .Blood Blood-Peripheral  Preliminary Report (07 Feb 2024 20:02):    No growth at 48 Hours    Culture - Blood (collected 05 Feb 2024 14:45)  Source: .Blood Blood-Peripheral  Preliminary Report (07 Feb 2024 20:02):    No growth at 48 Hours    RADIOLOGY & ADDITIONAL TESTS:    New Results Reviewed Today:   New Imaging Personally Reviewed Today: CTC personally interpreted with ILD  New Electrocardiogram Personally Reviewed Today:  Prior or Outpatient Records Reviewed Today:    COMMUNICATION:  Care Discussed with Consultants/Other Providers and Details of Discussion: Discussed with ACP  Discussions with Patient/Family:  PCP Communication:

## 2024-02-08 NOTE — CONSULT NOTE ADULT - TIME BILLING
Reviewed images and labs. Clinical decision making, changes in medication regimen. Frequent bedside visits. Discussion with primary team, family, and patient.

## 2024-02-08 NOTE — PROGRESS NOTE ADULT - PROBLEM SELECTOR PLAN 8
- HSQ for DVT ppx  - PT recs home PT    DISPO: pending final urine cx results; clinical improvement; stable BP - hold home ARB due to hypotension requiring pressors   - monitor BP - hold PO meds and continue SSI while in hospital  - monitor BS and adjust insulin as needed

## 2024-02-08 NOTE — CONSULT NOTE ADULT - SUBJECTIVE AND OBJECTIVE BOX
# PULMONARY CONSULTATION NOTE  [facility] DIVISION OF PULMONARY, CRITICAL CARE, AND SLEEP MEDICINE  Office: [phone]    **Patient Name**: VINNY SUGGS  MRN-[id number]    CHIEF COMPLAINT: Patient is a 78y old  Male who presents with a chief complaint of Hypotension (08 Feb 2024 11:43)      HISTORY OF PRESENT ILLNESS:   HPI:  79yo male with PMH of bladder tumor s/p resection, TURBT/TURP, diabetes, HTN, BPH, Lung cancer presented for fever  and onfusion. Seen outpatient and found to have positive UA with cultures growing E. coli.  Notes febrile at home with associated symptoms of decreased p.o. intake and increasing confusion. Denies nausea, vomiting, abdominal pain, hematuria, dysuria, urinary urgency. In ED, febrile 99.7 and hypotensive.  Given 3L IVF without improvement requiring levo 0.04.  IVC collapsible with predominant B-lines anteriorly on POCUS given 500 NS with pressors.  Labs pertinent for WBC 16, , BUN/Cr 108/3.08, and  UA positive for leukocyte esterase and bacteria.  Started on ceftriaxone for urosepsis.  MICU consulted for further management of urosepsis on pressors. (05 Feb 2024 22:38)      # Histories:  ## Family:  FAMILY HISTORY:    ## PMH/PSH:  PAST MEDICAL & SURGICAL HISTORY:  Diabetes mellitus, type 2  Hypertension  HLD (hyperlipidemia)  Lung cancer  Malignant neoplasm of bladder  History of COPD  Elective surgery left ear cyst removal  Elective surgery Nasal cyst removal  History of surgery left lung lobectomy  History of cholecystectomy    History of surgery TURB          Allergies No Known Allergies  Intolerances    ## Outpatient Pulmonologist:    ## Social History:  - Tobacco: extensive 100+ pack-yr former smoker (60 yrs x 4-5 PPD, quit 6 years ago)  - Alcohol: denies  - Other drugs: denies  - Lives with wife in house, no pets  - Retired; no known occupation exposures  - Code status: Full code    # ROS  [ x ] UNABLE TO OBTAIN REVIEW OF SYSTEMS DUE TO patient uncooperative, states he "wants to rest," does not answer any questions. All history obtained from family, at bedside, after discussion with wife and patient's children / relatives.    ## O/E:  ICU Vital Signs Last 24 Hrs  T(C): 36.8 (08 Feb 2024 10:59), Max: 37.3 (08 Feb 2024 05:01)  T(F): 98.2 (08 Feb 2024 10:59), Max: 99.2 (08 Feb 2024 05:01)  HR: 68 (08 Feb 2024 10:59) (66 - 79)  BP: 135/63 (08 Feb 2024 10:59) (135/63 - 152/74)  BP(mean): --  ABP: --  ABP(mean): --  RR: 17 (08 Feb 2024 10:59) (17 - 18)  SpO2: 94% (08 Feb 2024 10:59) (88% - 94%)    O2 Parameters below as of 08 Feb 2024 10:59  Patient On (Oxygen Delivery Method): nasal cannula  O2 Flow (L/min): 2        I&O's Summary    07 Feb 2024 07:01  -  08 Feb 2024 07:00  --------------------------------------------------------  IN: 250 mL / OUT: 2700 mL / NET: -2450 mL    08 Feb 2024 07:01  -  08 Feb 2024 17:08  --------------------------------------------------------  IN: 120 mL / OUT: 0 mL / NET: 120 mL        Gen: lying comfortably in bed in no apparent distress  HEENT: PERRL, EOMI  Resp: CTA B/L no c/r/w  CVS: S1S2 no m/r/g  Abd: soft NT/ND +BS  Ext: no c/c/e  Neuro: A&Ox3    # Medications  MEDICATIONS  (STANDING):  albuterol/ipratropium for Nebulization 3 milliLiter(s) Nebulizer every 6 hours  atorvastatin 40 milliGRAM(s) Oral at bedtime  escitalopram 20 milliGRAM(s) Oral at bedtime  heparin   Injectable 5000 Unit(s) SubCutaneous every 8 hours  influenza  Vaccine (HIGH DOSE) 0.7 milliLiter(s) IntraMuscular once  insulin lispro (ADMELOG) corrective regimen sliding scale   SubCutaneous at bedtime  insulin lispro (ADMELOG) corrective regimen sliding scale   SubCutaneous three times a day before meals  piperacillin/tazobactam IVPB.. 3.375 Gram(s) IV Intermittent every 8 hours  sodium bicarbonate 650 milliGRAM(s) Oral three times a day    MEDICATIONS  (PRN):  acetaminophen     Tablet .. 650 milliGRAM(s) Oral every 6 hours PRN Temp greater or equal to 38C (100.4F), Mild Pain (1 - 3)  aluminum hydroxide/magnesium hydroxide/simethicone Suspension 30 milliLiter(s) Oral every 4 hours PRN Dyspepsia  melatonin 3 milliGRAM(s) Oral at bedtime PRN Insomnia  ondansetron Injectable 4 milliGRAM(s) IV Push every 8 hours PRN Nausea and/or Vomiting      ```  ## Labs:  ** CBC: **                        8.9    6.36  )-----------( 286      ( 08 Feb 2024 07:03 )             26.7     ** Chem:  **  02-08    133<L>  |  100  |  34<H>  ----------------------------<  224<H>  4.6   |  19<L>  |  1.59<H>    Ca    8.7      08 Feb 2024 07:04  Phos  2.8     02-08  Mg     1.5     02-08    TPro  6.5  /  Alb  2.9<L>  /  TBili  0.2  /  DBili  x   /  AST  55<H>  /  ALT  57<H>  /  AlkPhos  65  02-07    POCT Blood Glucose.: 219 mg/dL (08 Feb 2024 13:13)  POCT Blood Glucose.: 227 mg/dL (08 Feb 2024 08:21)  POCT Blood Glucose.: 203 mg/dL (07 Feb 2024 22:06)  POCT Blood Glucose.: 252 mg/dL (07 Feb 2024 17:47)    ```    # Radiology / Imaging  ## CT Chest (my read): worsening fibrosis B/L bases in bibasilar and subpleural distribution pattern. Diffuse GGO throughout rest of lung fields, no specific craniocaudal distribution. Emphysema throughout. # PULMONARY CONSULTATION NOTE  [facility] DIVISION OF PULMONARY, CRITICAL CARE, AND SLEEP MEDICINE  Office: [phone]    **Patient Name**: VINNY SUGGS  MRN-[id number]    CHIEF COMPLAINT: Patient is a 78y old  Male who presents with a chief complaint of Hypotension (08 Feb 2024 11:43)      HISTORY OF PRESENT ILLNESS:   HPI:  77yo male with PMH of bladder tumor s/p resection, TURBT/TURP, diabetes, HTN, BPH, Lung cancer presented for fever  and onfusion. Seen outpatient and found to have positive UA with cultures growing E. coli.  Notes febrile at home with associated symptoms of decreased p.o. intake and increasing confusion. Denies nausea, vomiting, abdominal pain, hematuria, dysuria, urinary urgency. In ED, febrile 99.7 and hypotensive.  Given 3L IVF without improvement requiring levo 0.04.  IVC collapsible with predominant B-lines anteriorly on POCUS given 500 NS with pressors.  Labs pertinent for WBC 16, , BUN/Cr 108/3.08, and  UA positive for leukocyte esterase and bacteria.  Started on ceftriaxone for urosepsis.  MICU consulted for further management of urosepsis on pressors. (05 Feb 2024 22:38)      # Histories:  ## Family:  FAMILY HISTORY:    ## PMH/PSH:  PAST MEDICAL & SURGICAL HISTORY:  Diabetes mellitus, type 2  Hypertension  HLD (hyperlipidemia)  Lung cancer  Malignant neoplasm of bladder  History of COPD  Elective surgery left ear cyst removal  Elective surgery Nasal cyst removal  History of surgery left lung lobectomy  History of cholecystectomy    History of surgery TURB          Allergies No Known Allergies  Intolerances    ## Outpatient Pulmonologist:    ## Social History:  - Tobacco: extensive 100+ pack-yr former smoker (60 yrs x 4-5 PPD, quit 6 years ago)  - Alcohol: denies  - Other drugs: denies  - Lives with wife in house, no pets  - Retired; no known occupation exposures  - Code status: Full code    # ROS  [ x ] UNABLE TO OBTAIN REVIEW OF SYSTEMS DUE TO patient uncooperative, states he "wants to rest," does not answer any questions. All history obtained from family, at bedside, after discussion with wife and patient's children / relatives.    ## O/E:  ICU Vital Signs Last 24 Hrs  T(C): 36.8 (08 Feb 2024 10:59), Max: 37.3 (08 Feb 2024 05:01)  T(F): 98.2 (08 Feb 2024 10:59), Max: 99.2 (08 Feb 2024 05:01)  HR: 68 (08 Feb 2024 10:59) (66 - 79)  BP: 135/63 (08 Feb 2024 10:59) (135/63 - 152/74)  BP(mean): --  ABP: --  ABP(mean): --  RR: 17 (08 Feb 2024 10:59) (17 - 18)  SpO2: 94% (08 Feb 2024 10:59) (88% - 94%)    O2 Parameters below as of 08 Feb 2024 10:59  Patient On (Oxygen Delivery Method): nasal cannula  O2 Flow (L/min): 2        I&O's Summary    07 Feb 2024 07:01  -  08 Feb 2024 07:00  --------------------------------------------------------  IN: 250 mL / OUT: 2700 mL / NET: -2450 mL    08 Feb 2024 07:01  -  08 Feb 2024 17:08  --------------------------------------------------------  IN: 120 mL / OUT: 0 mL / NET: 120 mL    Gen: lying comfortably in bed in no apparent distress (spO2 97% [2LNC] HR 64)  HEENT: PERRL  Resp: faint end-expiratory wheeze  CVS: S1S2 no m/r/g  Abd: soft NT/ND +BS  Ext: no c/c/e  Neuro: A&Ox3    # Medications  MEDICATIONS  (STANDING):  albuterol/ipratropium for Nebulization 3 milliLiter(s) Nebulizer every 6 hours  atorvastatin 40 milliGRAM(s) Oral at bedtime  escitalopram 20 milliGRAM(s) Oral at bedtime  heparin   Injectable 5000 Unit(s) SubCutaneous every 8 hours  influenza  Vaccine (HIGH DOSE) 0.7 milliLiter(s) IntraMuscular once  insulin lispro (ADMELOG) corrective regimen sliding scale   SubCutaneous at bedtime  insulin lispro (ADMELOG) corrective regimen sliding scale   SubCutaneous three times a day before meals  piperacillin/tazobactam IVPB.. 3.375 Gram(s) IV Intermittent every 8 hours  sodium bicarbonate 650 milliGRAM(s) Oral three times a day    MEDICATIONS  (PRN):  acetaminophen     Tablet .. 650 milliGRAM(s) Oral every 6 hours PRN Temp greater or equal to 38C (100.4F), Mild Pain (1 - 3)  aluminum hydroxide/magnesium hydroxide/simethicone Suspension 30 milliLiter(s) Oral every 4 hours PRN Dyspepsia  melatonin 3 milliGRAM(s) Oral at bedtime PRN Insomnia  ondansetron Injectable 4 milliGRAM(s) IV Push every 8 hours PRN Nausea and/or Vomiting    ```  ## Labs:  ** CBC: **                        8.9    6.36  )-----------( 286      ( 08 Feb 2024 07:03 )             26.7     ** Chem:  **  02-08    133<L>  |  100  |  34<H>  ----------------------------<  224<H>  4.6   |  19<L>  |  1.59<H>    Ca    8.7      08 Feb 2024 07:04  Phos  2.8     02-08  Mg     1.5     02-08    TPro  6.5  /  Alb  2.9<L>  /  TBili  0.2  /  DBili  x   /  AST  55<H>  /  ALT  57<H>  /  AlkPhos  65  02-07    POCT Blood Glucose.: 219 mg/dL (08 Feb 2024 13:13)  POCT Blood Glucose.: 227 mg/dL (08 Feb 2024 08:21)  POCT Blood Glucose.: 203 mg/dL (07 Feb 2024 22:06)  POCT Blood Glucose.: 252 mg/dL (07 Feb 2024 17:47)    ```    # Radiology / Imaging  ## CT Chest (my read): worsening fibrosis B/L bases in bibasilar and subpleural distribution pattern. Diffuse GGO throughout rest of lung fields, no specific craniocaudal distribution. Emphysema throughout. Significantly worse than prior CT done in 2008, but also significantly worse than scans reviewed from outside institutions in 2022 and 12/2023.

## 2024-02-08 NOTE — PROGRESS NOTE ADULT - PROBLEM SELECTOR PLAN 4
- noted to have sodium level of 124 on admission   - likely due to hypovolemic hyponatremia  - sodium levels overall improving; 131 this morning  - monitor BMP daily for now - noted to have creatinine of 3 on admission, ATN from shock vs POLINA from urinary retention/UTI or combination  - improving  - monitor BMP daily Requiring potter placed 2/7, no hx of urinary retention. S/p neobladder w/ surgically removed prostate  - unclear why pt has urinary retention  - optimize with OOBTC, regular bowel movements

## 2024-02-08 NOTE — PROGRESS NOTE ADULT - PROBLEM SELECTOR PLAN 5
- Hgb currently stable around 9   - no signs of bleeding noted  - monitor CBC daily for now - noted to have sodium level of 124 on admission   - likely due to hypovolemic hyponatremia  - sodium levels overall improving  - monitor BMP daily for now - noted to have creatinine of 3 on admission, ATN from shock vs POLINA from urinary retention/UTI or combination  - improving  - monitor BMP daily

## 2024-02-08 NOTE — PROGRESS NOTE ADULT - ASSESSMENT
78M with PMH of bladder tumor s/p resection, TURBT/TURP, diabetes, HTN, BPH, Lung cancer, who presented to the ED due to fever and confusion. Originally admitted to MICU for urosepsis requiring pressor support. Now titrated off pressors and downgraded to medicine service for further management.

## 2024-02-09 ENCOUNTER — RESULT REVIEW (OUTPATIENT)
Age: 79
End: 2024-02-09

## 2024-02-09 LAB
ANION GAP SERPL CALC-SCNC: 12 MMOL/L — SIGNIFICANT CHANGE UP (ref 5–17)
BUN SERPL-MCNC: 27 MG/DL — HIGH (ref 7–23)
CALCIUM SERPL-MCNC: 9 MG/DL — SIGNIFICANT CHANGE UP (ref 8.4–10.5)
CHLORIDE SERPL-SCNC: 100 MMOL/L — SIGNIFICANT CHANGE UP (ref 96–108)
CO2 SERPL-SCNC: 23 MMOL/L — SIGNIFICANT CHANGE UP (ref 22–31)
CREAT SERPL-MCNC: 1.49 MG/DL — HIGH (ref 0.5–1.3)
EGFR: 48 ML/MIN/1.73M2 — LOW
FERRITIN SERPL-MCNC: 488 NG/ML — HIGH (ref 30–400)
GLUCOSE BLDC GLUCOMTR-MCNC: 270 MG/DL — HIGH (ref 70–99)
GLUCOSE BLDC GLUCOMTR-MCNC: 286 MG/DL — HIGH (ref 70–99)
GLUCOSE BLDC GLUCOMTR-MCNC: 297 MG/DL — HIGH (ref 70–99)
GLUCOSE BLDC GLUCOMTR-MCNC: 303 MG/DL — HIGH (ref 70–99)
GLUCOSE SERPL-MCNC: 282 MG/DL — HIGH (ref 70–99)
HCT VFR BLD CALC: 27.8 % — LOW (ref 39–50)
HGB BLD-MCNC: 9 G/DL — LOW (ref 13–17)
IRON SATN MFR SERPL: 14 % — LOW (ref 16–55)
IRON SATN MFR SERPL: 27 UG/DL — LOW (ref 45–165)
MAGNESIUM SERPL-MCNC: 1.7 MG/DL — SIGNIFICANT CHANGE UP (ref 1.6–2.6)
MCHC RBC-ENTMCNC: 29.8 PG — SIGNIFICANT CHANGE UP (ref 27–34)
MCHC RBC-ENTMCNC: 32.4 GM/DL — SIGNIFICANT CHANGE UP (ref 32–36)
MCV RBC AUTO: 92.1 FL — SIGNIFICANT CHANGE UP (ref 80–100)
NRBC # BLD: 0 /100 WBCS — SIGNIFICANT CHANGE UP (ref 0–0)
PHOSPHATE SERPL-MCNC: 3.7 MG/DL — SIGNIFICANT CHANGE UP (ref 2.5–4.5)
PLATELET # BLD AUTO: 296 K/UL — SIGNIFICANT CHANGE UP (ref 150–400)
POTASSIUM SERPL-MCNC: 4.5 MMOL/L — SIGNIFICANT CHANGE UP (ref 3.5–5.3)
POTASSIUM SERPL-SCNC: 4.5 MMOL/L — SIGNIFICANT CHANGE UP (ref 3.5–5.3)
PROCALCITONIN SERPL-MCNC: 0.46 NG/ML — HIGH (ref 0.02–0.1)
RBC # BLD: 3.02 M/UL — LOW (ref 4.2–5.8)
RBC # FLD: 13.4 % — SIGNIFICANT CHANGE UP (ref 10.3–14.5)
SODIUM SERPL-SCNC: 135 MMOL/L — SIGNIFICANT CHANGE UP (ref 135–145)
TIBC SERPL-MCNC: 196 UG/DL — LOW (ref 220–430)
UIBC SERPL-MCNC: 168 UG/DL — SIGNIFICANT CHANGE UP (ref 110–370)
WBC # BLD: 6.18 K/UL — SIGNIFICANT CHANGE UP (ref 3.8–10.5)
WBC # FLD AUTO: 6.18 K/UL — SIGNIFICANT CHANGE UP (ref 3.8–10.5)

## 2024-02-09 PROCEDURE — 93306 TTE W/DOPPLER COMPLETE: CPT | Mod: 26

## 2024-02-09 PROCEDURE — 99233 SBSQ HOSP IP/OBS HIGH 50: CPT

## 2024-02-09 PROCEDURE — 76376 3D RENDER W/INTRP POSTPROCES: CPT | Mod: 26

## 2024-02-09 RX ORDER — AZITHROMYCIN 500 MG/1
500 TABLET, FILM COATED ORAL ONCE
Refills: 0 | Status: COMPLETED | OUTPATIENT
Start: 2024-02-09 | End: 2024-02-09

## 2024-02-09 RX ORDER — FUROSEMIDE 40 MG
20 TABLET ORAL ONCE
Refills: 0 | Status: COMPLETED | OUTPATIENT
Start: 2024-02-09 | End: 2024-02-09

## 2024-02-09 RX ORDER — AZITHROMYCIN 500 MG/1
TABLET, FILM COATED ORAL
Refills: 0 | Status: DISCONTINUED | OUTPATIENT
Start: 2024-02-09 | End: 2024-02-12

## 2024-02-09 RX ORDER — BENZOCAINE AND MENTHOL 5; 1 G/100ML; G/100ML
1 LIQUID ORAL
Refills: 0 | Status: DISCONTINUED | OUTPATIENT
Start: 2024-02-09 | End: 2024-02-12

## 2024-02-09 RX ORDER — AZITHROMYCIN 500 MG/1
500 TABLET, FILM COATED ORAL EVERY 24 HOURS
Refills: 0 | Status: DISCONTINUED | OUTPATIENT
Start: 2024-02-10 | End: 2024-02-12

## 2024-02-09 RX ADMIN — Medication 3: at 09:31

## 2024-02-09 RX ADMIN — Medication 3 MILLILITER(S): at 17:35

## 2024-02-09 RX ADMIN — Medication 30 MILLIGRAM(S): at 17:35

## 2024-02-09 RX ADMIN — CEFTRIAXONE 100 MILLIGRAM(S): 500 INJECTION, POWDER, FOR SOLUTION INTRAMUSCULAR; INTRAVENOUS at 21:33

## 2024-02-09 RX ADMIN — Medication 3 MILLILITER(S): at 11:19

## 2024-02-09 RX ADMIN — AZITHROMYCIN 255 MILLIGRAM(S): 500 TABLET, FILM COATED ORAL at 09:02

## 2024-02-09 RX ADMIN — Medication 3 MILLILITER(S): at 23:43

## 2024-02-09 RX ADMIN — Medication 650 MILLIGRAM(S): at 05:32

## 2024-02-09 RX ADMIN — ESCITALOPRAM OXALATE 20 MILLIGRAM(S): 10 TABLET, FILM COATED ORAL at 21:36

## 2024-02-09 RX ADMIN — HEPARIN SODIUM 5000 UNIT(S): 5000 INJECTION INTRAVENOUS; SUBCUTANEOUS at 13:10

## 2024-02-09 RX ADMIN — Medication 3 MILLILITER(S): at 05:32

## 2024-02-09 RX ADMIN — Medication 650 MILLIGRAM(S): at 13:10

## 2024-02-09 RX ADMIN — Medication 3: at 17:48

## 2024-02-09 RX ADMIN — BENZOCAINE AND MENTHOL 1 LOZENGE: 5; 1 LIQUID ORAL at 13:10

## 2024-02-09 RX ADMIN — BENZOCAINE AND MENTHOL 1 LOZENGE: 5; 1 LIQUID ORAL at 17:35

## 2024-02-09 RX ADMIN — ATORVASTATIN CALCIUM 40 MILLIGRAM(S): 80 TABLET, FILM COATED ORAL at 21:36

## 2024-02-09 RX ADMIN — HEPARIN SODIUM 5000 UNIT(S): 5000 INJECTION INTRAVENOUS; SUBCUTANEOUS at 05:31

## 2024-02-09 RX ADMIN — Medication 650 MILLIGRAM(S): at 21:34

## 2024-02-09 RX ADMIN — HEPARIN SODIUM 5000 UNIT(S): 5000 INJECTION INTRAVENOUS; SUBCUTANEOUS at 21:34

## 2024-02-09 RX ADMIN — Medication 4: at 14:03

## 2024-02-09 RX ADMIN — Medication 20 MILLIGRAM(S): at 13:10

## 2024-02-09 RX ADMIN — BENZOCAINE AND MENTHOL 1 LOZENGE: 5; 1 LIQUID ORAL at 23:42

## 2024-02-09 RX ADMIN — Medication 2: at 21:34

## 2024-02-09 NOTE — PROGRESS NOTE ADULT - PROBLEM SELECTOR PLAN 7
- Hgb currently stable around 9   - no signs of bleeding noted  - monitor CBC daily for now  - f/u iron studies

## 2024-02-09 NOTE — PROGRESS NOTE ADULT - PROBLEM SELECTOR PLAN 6
- noted to have sodium level of 124 on admission   - likely due to hypovolemic hyponatremia  - sodium levels overall improving  - monitor BMP daily for now

## 2024-02-09 NOTE — PROGRESS NOTE ADULT - SUBJECTIVE AND OBJECTIVE BOX
John Cox MD  Division of Hospital Medicine  Available on MS teams until 7pm  If no response or off-hours, page [phone]  -------------------------------------    Patient is a 78y old  Male who presents with a chief complaint of Hypotension (08 Feb 2024 17:08)      SUBJECTIVE / OVERNIGHT EVENTS: none acute  ADDITIONAL REVIEW OF SYSTEMS: Samoan translation provided by bel Marshall at bedside- pt feels better, no significant SOB, no fevers/chills, no cough. Tolerating PO    MEDICATIONS  (STANDING):  albuterol/ipratropium for Nebulization 3 milliLiter(s) Nebulizer every 6 hours  atorvastatin 40 milliGRAM(s) Oral at bedtime  azithromycin  IVPB      benzocaine/menthol Lozenge 1 Lozenge Oral four times a day  cefTRIAXone   IVPB 1000 milliGRAM(s) IV Intermittent every 24 hours  escitalopram 20 milliGRAM(s) Oral at bedtime  furosemide   Injectable 20 milliGRAM(s) IV Push once  heparin   Injectable 5000 Unit(s) SubCutaneous every 8 hours  influenza  Vaccine (HIGH DOSE) 0.7 milliLiter(s) IntraMuscular once  insulin lispro (ADMELOG) corrective regimen sliding scale   SubCutaneous at bedtime  insulin lispro (ADMELOG) corrective regimen sliding scale   SubCutaneous three times a day before meals  sodium bicarbonate 650 milliGRAM(s) Oral three times a day    MEDICATIONS  (PRN):  acetaminophen     Tablet .. 650 milliGRAM(s) Oral every 6 hours PRN Temp greater or equal to 38C (100.4F), Mild Pain (1 - 3)  aluminum hydroxide/magnesium hydroxide/simethicone Suspension 30 milliLiter(s) Oral every 4 hours PRN Dyspepsia  melatonin 3 milliGRAM(s) Oral at bedtime PRN Insomnia  ondansetron Injectable 4 milliGRAM(s) IV Push every 8 hours PRN Nausea and/or Vomiting      CAPILLARY BLOOD GLUCOSE      POCT Blood Glucose.: 270 mg/dL (09 Feb 2024 08:55)  POCT Blood Glucose.: 316 mg/dL (08 Feb 2024 21:32)  POCT Blood Glucose.: 274 mg/dL (08 Feb 2024 17:53)  POCT Blood Glucose.: 219 mg/dL (08 Feb 2024 13:13)    I&O's Summary    08 Feb 2024 07:01  -  09 Feb 2024 07:00  --------------------------------------------------------  IN: 420 mL / OUT: 1700 mL / NET: -1280 mL        PHYSICAL EXAM:  Vital Signs Last 24 Hrs  T(C): 37.5 (09 Feb 2024 04:26), Max: 37.5 (09 Feb 2024 04:26)  T(F): 99.5 (09 Feb 2024 04:26), Max: 99.5 (09 Feb 2024 04:26)  HR: 64 (09 Feb 2024 04:26) (64 - 79)  BP: 126/60 (09 Feb 2024 04:26) (100/63 - 126/60)  BP(mean): --  RR: 18 (09 Feb 2024 10:00) (17 - 18)  SpO2: 93% (09 Feb 2024 10:00) (93% - 95%)    Parameters below as of 09 Feb 2024 10:00  Patient On (Oxygen Delivery Method): room air      CONSTITUTIONAL: NAD  EYES: PERRLA; conjunctiva and sclera clear  ENMT: MMM  NECK: Supple  RESPIRATORY: bibasilar crackles  CARDIOVASCULAR: RRR, no JVD, no peripheral edema   ABDOMEN: Nontender to palpation, normoactive BS, no guarding/rigidity  MUSCLOSKELETAL:  no clubbing/cyanosis, no joint swelling or tenderness to palpation  PSYCH: A+O x 3, affect normal  NEUROLOGY: CN 2-12 are intact and symmetric; no gross sensory or motor deficits  SKIN: No rashes; no palpable lesions    LABS:                        9.0    6.18  )-----------( 296      ( 09 Feb 2024 06:51 )             27.8     02-09    135  |  100  |  27<H>  ----------------------------<  282<H>  4.5   |  23  |  1.49<H>    Ca    9.0      09 Feb 2024 06:51  Phos  3.7     02-09  Mg     1.7     02-09            Urinalysis Basic - ( 09 Feb 2024 06:51 )    Color: x / Appearance: x / SG: x / pH: x  Gluc: 282 mg/dL / Ketone: x  / Bili: x / Urobili: x   Blood: x / Protein: x / Nitrite: x   Leuk Esterase: x / RBC: x / WBC x   Sq Epi: x / Non Sq Epi: x / Bacteria: x          RADIOLOGY & ADDITIONAL TESTS:  Results Reviewed:   Imaging Personally Reviewed:  Electrocardiogram Personally Reviewed:    COORDINATION OF CARE:  Care Discussed with Consultants/Other Providers [Y/N]:  Prior or Outpatient Records Reviewed [Y/N]:

## 2024-02-09 NOTE — PROGRESS NOTE ADULT - PROBLEM SELECTOR PLAN 2
Hx of ILD, possible ILD flare vs volume overload from resuscitation from septic shock on admission (received 4.5L)  - on admissions saturating % on RA, now requiring 2L NC, maintain 88-92%  - duoneb  - f/u flu/COVID swab- neg  - trial of diuresis (b/l plef and interseptal lobar thickening) - lasix 20 iv x 1 2/8, 2/9  - strict I&Os  - f/u BNP  - was not on home o2- if unable to wean in the next day, will likely need set up of home o2  - pulm consulted, possible trial of steroids if doesn't improve

## 2024-02-09 NOTE — PROGRESS NOTE ADULT - PROBLEM SELECTOR PLAN 4
Requiring potter placed 2/7, no hx of urinary retention. S/p neobladder w/ surgically removed prostate  - optimize with OOBTC, regular bowel movements  - had bm 2/8, will reattempt TOV 2/9  - if fails, will need chronic potter and OP urology referral

## 2024-02-09 NOTE — PROGRESS NOTE ADULT - PROBLEM SELECTOR PLAN 5
- noted to have creatinine of 3 on admission, ATN from shock vs POLINA from urinary retention/UTI or combination  - improving  - monitor BMP daily

## 2024-02-09 NOTE — PROGRESS NOTE ADULT - PROBLEM SELECTOR PLAN 3
Hx of ILD, previously followed with Dr. Mcpherson @ [facility] but has not seen in the last 3 years  - prior imaging reports placed in paper chart  - pulm consulted, possible steroid trial if doesn't improve

## 2024-02-09 NOTE — PROGRESS NOTE ADULT - PROBLEM SELECTOR PLAN 9
- hold home ARB due to hypotension requiring pressors   - monitor BP- can resume if tolerates lasix and BP remains stable

## 2024-02-10 LAB
ANION GAP SERPL CALC-SCNC: 16 MMOL/L — SIGNIFICANT CHANGE UP (ref 5–17)
BUN SERPL-MCNC: 27 MG/DL — HIGH (ref 7–23)
CALCIUM SERPL-MCNC: 9 MG/DL — SIGNIFICANT CHANGE UP (ref 8.4–10.5)
CHLORIDE SERPL-SCNC: 98 MMOL/L — SIGNIFICANT CHANGE UP (ref 96–108)
CO2 SERPL-SCNC: 20 MMOL/L — LOW (ref 22–31)
CREAT SERPL-MCNC: 1.54 MG/DL — HIGH (ref 0.5–1.3)
CULTURE RESULTS: SIGNIFICANT CHANGE UP
CULTURE RESULTS: SIGNIFICANT CHANGE UP
EGFR: 46 ML/MIN/1.73M2 — LOW
GLUCOSE BLDC GLUCOMTR-MCNC: 282 MG/DL — HIGH (ref 70–99)
GLUCOSE BLDC GLUCOMTR-MCNC: 306 MG/DL — HIGH (ref 70–99)
GLUCOSE BLDC GLUCOMTR-MCNC: 328 MG/DL — HIGH (ref 70–99)
GLUCOSE BLDC GLUCOMTR-MCNC: 345 MG/DL — HIGH (ref 70–99)
GLUCOSE BLDC GLUCOMTR-MCNC: 425 MG/DL — HIGH (ref 70–99)
GLUCOSE SERPL-MCNC: 310 MG/DL — HIGH (ref 70–99)
HCT VFR BLD CALC: 26.1 % — LOW (ref 39–50)
HCT VFR BLD CALC: 26.2 % — LOW (ref 39–50)
HGB BLD-MCNC: 8.4 G/DL — LOW (ref 13–17)
HGB BLD-MCNC: 8.5 G/DL — LOW (ref 13–17)
MCHC RBC-ENTMCNC: 29.6 PG — SIGNIFICANT CHANGE UP (ref 27–34)
MCHC RBC-ENTMCNC: 30.1 PG — SIGNIFICANT CHANGE UP (ref 27–34)
MCHC RBC-ENTMCNC: 32.1 GM/DL — SIGNIFICANT CHANGE UP (ref 32–36)
MCHC RBC-ENTMCNC: 32.6 GM/DL — SIGNIFICANT CHANGE UP (ref 32–36)
MCV RBC AUTO: 92.3 FL — SIGNIFICANT CHANGE UP (ref 80–100)
MCV RBC AUTO: 92.6 FL — SIGNIFICANT CHANGE UP (ref 80–100)
NRBC # BLD: 0 /100 WBCS — SIGNIFICANT CHANGE UP (ref 0–0)
NRBC # BLD: 0 /100 WBCS — SIGNIFICANT CHANGE UP (ref 0–0)
PLATELET # BLD AUTO: 310 K/UL — SIGNIFICANT CHANGE UP (ref 150–400)
PLATELET # BLD AUTO: 318 K/UL — SIGNIFICANT CHANGE UP (ref 150–400)
POTASSIUM SERPL-MCNC: 4.1 MMOL/L — SIGNIFICANT CHANGE UP (ref 3.5–5.3)
POTASSIUM SERPL-SCNC: 4.1 MMOL/L — SIGNIFICANT CHANGE UP (ref 3.5–5.3)
RBC # BLD: 2.82 M/UL — LOW (ref 4.2–5.8)
RBC # BLD: 2.84 M/UL — LOW (ref 4.2–5.8)
RBC # FLD: 13.2 % — SIGNIFICANT CHANGE UP (ref 10.3–14.5)
RBC # FLD: 13.3 % — SIGNIFICANT CHANGE UP (ref 10.3–14.5)
SODIUM SERPL-SCNC: 134 MMOL/L — LOW (ref 135–145)
SPECIMEN SOURCE: SIGNIFICANT CHANGE UP
SPECIMEN SOURCE: SIGNIFICANT CHANGE UP
WBC # BLD: 6.45 K/UL — SIGNIFICANT CHANGE UP (ref 3.8–10.5)
WBC # BLD: 8.59 K/UL — SIGNIFICANT CHANGE UP (ref 3.8–10.5)
WBC # FLD AUTO: 6.45 K/UL — SIGNIFICANT CHANGE UP (ref 3.8–10.5)
WBC # FLD AUTO: 8.59 K/UL — SIGNIFICANT CHANGE UP (ref 3.8–10.5)

## 2024-02-10 PROCEDURE — 99233 SBSQ HOSP IP/OBS HIGH 50: CPT

## 2024-02-10 RX ORDER — BUDESONIDE AND FORMOTEROL FUMARATE DIHYDRATE 160; 4.5 UG/1; UG/1
2 AEROSOL RESPIRATORY (INHALATION)
Refills: 0 | Status: DISCONTINUED | OUTPATIENT
Start: 2024-02-10 | End: 2024-02-12

## 2024-02-10 RX ORDER — FUROSEMIDE 40 MG
20 TABLET ORAL ONCE
Refills: 0 | Status: COMPLETED | OUTPATIENT
Start: 2024-02-10 | End: 2024-02-10

## 2024-02-10 RX ADMIN — AZITHROMYCIN 255 MILLIGRAM(S): 500 TABLET, FILM COATED ORAL at 12:59

## 2024-02-10 RX ADMIN — Medication 3: at 17:42

## 2024-02-10 RX ADMIN — Medication 3 MILLILITER(S): at 11:49

## 2024-02-10 RX ADMIN — BUDESONIDE AND FORMOTEROL FUMARATE DIHYDRATE 2 PUFF(S): 160; 4.5 AEROSOL RESPIRATORY (INHALATION) at 17:14

## 2024-02-10 RX ADMIN — Medication 30 MILLIGRAM(S): at 14:01

## 2024-02-10 RX ADMIN — CEFTRIAXONE 100 MILLIGRAM(S): 500 INJECTION, POWDER, FOR SOLUTION INTRAMUSCULAR; INTRAVENOUS at 21:51

## 2024-02-10 RX ADMIN — Medication 650 MILLIGRAM(S): at 13:10

## 2024-02-10 RX ADMIN — BENZOCAINE AND MENTHOL 1 LOZENGE: 5; 1 LIQUID ORAL at 23:31

## 2024-02-10 RX ADMIN — BENZOCAINE AND MENTHOL 1 LOZENGE: 5; 1 LIQUID ORAL at 11:49

## 2024-02-10 RX ADMIN — BENZOCAINE AND MENTHOL 1 LOZENGE: 5; 1 LIQUID ORAL at 17:14

## 2024-02-10 RX ADMIN — ESCITALOPRAM OXALATE 20 MILLIGRAM(S): 10 TABLET, FILM COATED ORAL at 21:51

## 2024-02-10 RX ADMIN — Medication 650 MILLIGRAM(S): at 05:40

## 2024-02-10 RX ADMIN — Medication 4: at 12:40

## 2024-02-10 RX ADMIN — Medication 650 MILLIGRAM(S): at 21:51

## 2024-02-10 RX ADMIN — BENZOCAINE AND MENTHOL 1 LOZENGE: 5; 1 LIQUID ORAL at 05:40

## 2024-02-10 RX ADMIN — HEPARIN SODIUM 5000 UNIT(S): 5000 INJECTION INTRAVENOUS; SUBCUTANEOUS at 05:39

## 2024-02-10 RX ADMIN — HEPARIN SODIUM 5000 UNIT(S): 5000 INJECTION INTRAVENOUS; SUBCUTANEOUS at 13:10

## 2024-02-10 RX ADMIN — Medication 3 MILLILITER(S): at 23:30

## 2024-02-10 RX ADMIN — HEPARIN SODIUM 5000 UNIT(S): 5000 INJECTION INTRAVENOUS; SUBCUTANEOUS at 21:50

## 2024-02-10 RX ADMIN — Medication 4: at 09:11

## 2024-02-10 RX ADMIN — Medication 1 TABLET(S): at 21:59

## 2024-02-10 RX ADMIN — Medication 3 MILLILITER(S): at 05:39

## 2024-02-10 RX ADMIN — ATORVASTATIN CALCIUM 40 MILLIGRAM(S): 80 TABLET, FILM COATED ORAL at 21:50

## 2024-02-10 RX ADMIN — Medication 20 MILLIGRAM(S): at 16:13

## 2024-02-10 RX ADMIN — Medication 3 MILLILITER(S): at 17:14

## 2024-02-10 NOTE — PROGRESS NOTE ADULT - SUBJECTIVE AND OBJECTIVE BOX
[facility] - Division of Pulmonary, Critical Care and Sleep Medicine   Please call [phone] between 8am-pm weekdays, [phone] after hours and weekends    Interval Events:    REVIEW OF SYSTEMS:  CV: [ ] chest pain   Resp: [ ] cough [ ] shortness of breath   [ ] All other systems negative  [ ] Unable to assess ROS because ________    OBJECTIVE:  ICU Vital Signs Last 24 Hrs  T(C): 36.8 (10 Feb 2024 04:58), Max: 37.1 (09 Feb 2024 13:02)  T(F): 98.3 (10 Feb 2024 04:58), Max: 98.8 (09 Feb 2024 13:02)  HR: 68 (10 Feb 2024 04:58) (68 - 78)  BP: 111/63 (10 Feb 2024 04:58) (97/60 - 115/62)  BP(mean): --  ABP: --  ABP(mean): --  RR: 18 (09 Feb 2024 20:04) (18 - 18)  SpO2: 97% (09 Feb 2024 20:04) (93% - 97%)    O2 Parameters below as of 09 Feb 2024 20:04  Patient On (Oxygen Delivery Method): room air              02-09 @ 07:01  -  02-10 @ 07:00  --------------------------------------------------------  IN: 440 mL / OUT: 870 mL / NET: -430 mL      CAPILLARY BLOOD GLUCOSE      POCT Blood Glucose.: 297 mg/dL (09 Feb 2024 21:24)      PHYSICAL EXAM:  General: NAD  HEENT: NC/AT  Lymph Nodes: no cervical or supraclavicular lymphadenopathy  Neck: supple  Respiratory:  CTA b/l, no wheezes, crackles or rhonchi  Cardiovascular:  RRR, no m/r/g  Abdomen: soft, NT/ND, +BS  Extremities: no clubbing, cyanosis or edema, warm  Skin: no rash  Neurological: AAOx3, non focal exam  Psychiatry: not anxious appearing, normal affect and mood    HOSPITAL MEDICATIONS:  MEDICATIONS  (STANDING):  albuterol/ipratropium for Nebulization 3 milliLiter(s) Nebulizer every 6 hours  atorvastatin 40 milliGRAM(s) Oral at bedtime  azithromycin  IVPB      azithromycin  IVPB 500 milliGRAM(s) IV Intermittent every 24 hours  benzocaine/menthol Lozenge 1 Lozenge Oral four times a day  cefTRIAXone   IVPB 1000 milliGRAM(s) IV Intermittent every 24 hours  escitalopram 20 milliGRAM(s) Oral at bedtime  heparin   Injectable 5000 Unit(s) SubCutaneous every 8 hours  influenza  Vaccine (HIGH DOSE) 0.7 milliLiter(s) IntraMuscular once  insulin lispro (ADMELOG) corrective regimen sliding scale   SubCutaneous at bedtime  insulin lispro (ADMELOG) corrective regimen sliding scale   SubCutaneous three times a day before meals  oseltamivir 30 milliGRAM(s) Oral every 24 hours  sodium bicarbonate 650 milliGRAM(s) Oral three times a day    MEDICATIONS  (PRN):  acetaminophen     Tablet .. 650 milliGRAM(s) Oral every 6 hours PRN Temp greater or equal to 38C (100.4F), Mild Pain (1 - 3)  aluminum hydroxide/magnesium hydroxide/simethicone Suspension 30 milliLiter(s) Oral every 4 hours PRN Dyspepsia  melatonin 3 milliGRAM(s) Oral at bedtime PRN Insomnia  ondansetron Injectable 4 milliGRAM(s) IV Push every 8 hours PRN Nausea and/or Vomiting      LABS:                        8.4    6.45  )-----------( 310      ( 10 Feb 2024 07:27 )             26.2     Hgb Trend: 8.4<--, 9.0<--, 8.9<--, 9.2<--, 9.0<--  02-09    135  |  100  |  27<H>  ----------------------------<  282<H>  4.5   |  23  |  1.49<H>    Ca    9.0      09 Feb 2024 06:51  Phos  3.7     02-09  Mg     1.7     02-09      Creatinine Trend: 1.49<--, 1.59<--, 2.00<--, 2.66<--, 2.59<--, 3.08<--    Urinalysis Basic - ( 09 Feb 2024 06:51 )    Color: x / Appearance: x / SG: x / pH: x  Gluc: 282 mg/dL / Ketone: x  / Bili: x / Urobili: x   Blood: x / Protein: x / Nitrite: x   Leuk Esterase: x / RBC: x / WBC x   Sq Epi: x / Non Sq Epi: x / Bacteria: x            MICROBIOLOGY:     RADIOLOGY:  [ ] Reviewed and interpreted by me   [facility] - Division of Pulmonary, Critical Care and Sleep Medicine   Please call [phone] between 8am-pm weekdays, [phone] after hours and weekends    Interval Events: no events overnight, continues to have nonproductive cough, denies dyspnea at rest or chest pain    REVIEW OF SYSTEMS:  CV: [ ] chest pain   Resp: [ ] cough [ ] shortness of breath   [ x] All other systems negative  [ ] Unable to assess ROS because ________    OBJECTIVE:  ICU Vital Signs Last 24 Hrs  T(C): 36.8 (10 Feb 2024 04:58), Max: 37.1 (09 Feb 2024 13:02)  T(F): 98.3 (10 Feb 2024 04:58), Max: 98.8 (09 Feb 2024 13:02)  HR: 68 (10 Feb 2024 04:58) (68 - 78)  BP: 111/63 (10 Feb 2024 04:58) (97/60 - 115/62)  BP(mean): --  ABP: --  ABP(mean): --  RR: 18 (09 Feb 2024 20:04) (18 - 18)  SpO2: 97% (09 Feb 2024 20:04) (93% - 97%)    O2 Parameters below as of 09 Feb 2024 20:04  Patient On (Oxygen Delivery Method): room air              02-09 @ 07:01  -  02-10 @ 07:00  --------------------------------------------------------  IN: 440 mL / OUT: 870 mL / NET: -430 mL      CAPILLARY BLOOD GLUCOSE      POCT Blood Glucose.: 297 mg/dL (09 Feb 2024 21:24)      PHYSICAL EXAM:  General: NAD  HEENT: NC/AT  Neck: supple  Respiratory:  scattered crackles, no wheezes or rhonchi  Cardiovascular:  RRR, no m/r/g  Abdomen: soft, NT/ND, +BS  Extremities: no clubbing, cyanosis or edema, warm  Skin: no rash  Neurological: AAOx3, non focal exam  Psychiatry: not anxious appearing, normal affect and mood    HOSPITAL MEDICATIONS:  MEDICATIONS  (STANDING):  albuterol/ipratropium for Nebulization 3 milliLiter(s) Nebulizer every 6 hours  atorvastatin 40 milliGRAM(s) Oral at bedtime  azithromycin  IVPB      azithromycin  IVPB 500 milliGRAM(s) IV Intermittent every 24 hours  benzocaine/menthol Lozenge 1 Lozenge Oral four times a day  cefTRIAXone   IVPB 1000 milliGRAM(s) IV Intermittent every 24 hours  escitalopram 20 milliGRAM(s) Oral at bedtime  heparin   Injectable 5000 Unit(s) SubCutaneous every 8 hours  influenza  Vaccine (HIGH DOSE) 0.7 milliLiter(s) IntraMuscular once  insulin lispro (ADMELOG) corrective regimen sliding scale   SubCutaneous at bedtime  insulin lispro (ADMELOG) corrective regimen sliding scale   SubCutaneous three times a day before meals  oseltamivir 30 milliGRAM(s) Oral every 24 hours  sodium bicarbonate 650 milliGRAM(s) Oral three times a day    MEDICATIONS  (PRN):  acetaminophen     Tablet .. 650 milliGRAM(s) Oral every 6 hours PRN Temp greater or equal to 38C (100.4F), Mild Pain (1 - 3)  aluminum hydroxide/magnesium hydroxide/simethicone Suspension 30 milliLiter(s) Oral every 4 hours PRN Dyspepsia  melatonin 3 milliGRAM(s) Oral at bedtime PRN Insomnia  ondansetron Injectable 4 milliGRAM(s) IV Push every 8 hours PRN Nausea and/or Vomiting      LABS:                        8.4    6.45  )-----------( 310      ( 10 Feb 2024 07:27 )             26.2     Hgb Trend: 8.4<--, 9.0<--, 8.9<--, 9.2<--, 9.0<--  02-09    135  |  100  |  27<H>  ----------------------------<  282<H>  4.5   |  23  |  1.49<H>    Ca    9.0      09 Feb 2024 06:51  Phos  3.7     02-09  Mg     1.7     02-09      Creatinine Trend: 1.49<--, 1.59<--, 2.00<--, 2.66<--, 2.59<--, 3.08<--    Urinalysis Basic - ( 09 Feb 2024 06:51 )    Color: x / Appearance: x / SG: x / pH: x  Gluc: 282 mg/dL / Ketone: x  / Bili: x / Urobili: x   Blood: x / Protein: x / Nitrite: x   Leuk Esterase: x / RBC: x / WBC x   Sq Epi: x / Non Sq Epi: x / Bacteria: x            MICROBIOLOGY:     RADIOLOGY:  [x ] Reviewed and interpreted by me

## 2024-02-10 NOTE — PROGRESS NOTE ADULT - PROBLEM SELECTOR PLAN 8
- Hold PO meds and continue SSI while in hospital  - monitor BS and adjust insulin as needed  - Trend FS (goal -180) Yes

## 2024-02-10 NOTE — PROGRESS NOTE ADULT - PROBLEM SELECTOR PLAN 3
- Hx of ILD, previously followed with Dr. Mcpherson @ [place] but has not seen in the last 3 years  - prior imaging reports placed in paper chart  - Pulm consulted, recs as above

## 2024-02-10 NOTE — PROGRESS NOTE ADULT - PROBLEM SELECTOR PLAN 6
- noted to have sodium level of 124 on admission   - likely due to hypovolemic hyponatremia - Resolved  - Monitor BMP daily for now

## 2024-02-10 NOTE — PROGRESS NOTE ADULT - PROBLEM SELECTOR PLAN 5
- noted to have creatinine of 3 on admission, ATN from shock vs POLINA from urinary retention/UTI or combination  - Cr 1.59, stable in the 1.5 range  - Monitor BMP daily  - Avoid nephrotoxins, renally dose all medications

## 2024-02-10 NOTE — PROGRESS NOTE ADULT - PROBLEM SELECTOR PLAN 7
- Hgb currently stable in the 8-9 range   - No signs of bleeding noted. Iron studies consisted with AOCD  - Monitor CBC daily for now

## 2024-02-10 NOTE — PROGRESS NOTE ADULT - ASSESSMENT
78M with extensive smoking history, lung cancer, ILD a/w urosepsis/shock and acute hypoxemic respiratory failure.  Urine culture with pan sensitive Ecoli, POLINA improving.    Radiology / Imaging- CT Chest (my read): worsening fibrosis B/L bases in bibasilar and subpleural distribution pattern. Diffuse GGO throughout rest of lung fields, no specific craniocaudal distribution. Emphysema throughout. Significantly worse than prior CT done in 2008, but also significantly worse than scans reviewed from outside institutions in 2022 and 12/2023.    - Given that the CT looked significantly better only 6 weeks before, the time course seems unlikely for progressive decompensation due to patient's pre-existing lung disease. Rather, it appears to be something more acute: infection vs fluid overload vs acute exacerbation of ILD. It is unclear if the fibrosis is true fibrosis, or if fluid or infection has made the existing lung parenchyma more opacified in the setting of emphysema, making the area look fibrosed.  - c/w Ceftriaxone and Azithro, Duonebs Q6 hours, would add Symbicort 160/4.5 mg BID  - RVP negative, Procalcitonin elevated  - While generally appears euvolemic, would diurese net negative  - Oxygen requirements are generally minimal at present, on 2LNC. Titrate FiO2 as tolerated to maintain spO2 =92%.  Has remained stable on this.   -DVT ppx, incentive spirometry  -Outpatient pulmonary follow up with repeat CT chest at 4-6 weeks 78M with extensive smoking history, lung cancer, ILD a/w urosepsis/shock and acute hypoxemic respiratory failure.  Urine culture with pan sensitive Ecoli, POLINA improving.    Radiology / Imaging- CT Chest (my read): worsening fibrosis B/L bases in bibasilar and subpleural distribution pattern. Diffuse GGO throughout rest of lung fields, no specific craniocaudal distribution. Emphysema throughout. Significantly worse than prior CT done in 2008, but also significantly worse than scans reviewed from outside institutions in 2022 and 12/2023.    - Given that the CT looked significantly better only 6 weeks before, the time course seems unlikely for progressive decompensation due to patient's pre-existing lung disease. Rather, it appears to be something more acute: infection vs fluid overload vs acute exacerbation of ILD. It is unclear if the fibrosis is true fibrosis, or if fluid or infection has made the existing lung parenchyma more opacified in the setting of emphysema, making the area look fibrosed.  - c/w Ceftriaxone and Azithro, Duonebs Q6 hours, would add Symbicort 160/4.5 mg BID (outpatient med)  - RVP negative, Procalcitonin elevated  - While generally appears euvolemic, would diurese net negative  - Oxygen requirements are generally minimal at present, on 2LNC. Titrate FiO2 as tolerated to maintain spO2 =92%.  Has remained stable on this.   -DVT ppx, incentive spirometry  -Outpatient pulmonary follow up with repeat CT chest at 4-6 weeks  Out of bed to chair    I discussed above with the patient and his family at length, all questions answered

## 2024-02-10 NOTE — PROGRESS NOTE ADULT - SUBJECTIVE AND OBJECTIVE BOX
Contact Information:  Devaughn Love II, MD, MPH  Internal Medicine    VINNY SUGGS, MRN-[id number]    Patient is a 78y old  Male who presents with a chief complaint of Hypotension (10 Feb 2024 08:19)      OVERNIGHT EVENTS/INTERVAL/SUBJECTIVE: Patient evaluated at bedside, noted to have had some hemoptysis this morning, was noted to have had a nosebleed a couple of days ago. Otherwise, states that SOB is improving. He denies SOB, CP, ABD pain, numbness, tingling, lightheadedness, dizziness, N/V.     CONSTITUTIONAL: No weakness. No fatigue. No fever.  HEAD: No head trauma.   EYES: No vision changes.  ENT: No hearing changes or tinnitus. No ear pain. No changes in smell. No nasal congestion or discharge. No sore throat. No voice hoarseness.   NECK: No neck pain or stiffness. No lumps.  RESPIRATORY: +Small volume. +Improving SOB. No cough. No wheezing.   CARDIOVASCULAR: No chest pain. No palpitations.   GASTROINTESTINAL: No dysphagia. No ABD pain. No distension. No constipation. No diarrhea. No pain with defecation. No hematemesis. No hematochezia or melena.  BACK: No back pain.  GENITOURINARY: No dysuria. No frequency or urgency. No hesitancy. No incontinence. No urinary retention. No suprapubic pain. No hematuria.  EXTREMITY: No swelling.  MUSCULOSKELETAL: No joint pain or swelling. No fractures. No stiffness.    SKIN: No rashes. No itching. No skin, hair, or nail changes.  NEUROLOGICAL: No weakness or paralysis. No lightheadedness or dizziness. No HA. No numbness or tingling.   PSYCHIATRIC: No depression.       OBJECTIVE:  Vital Signs Last 24 Hrs  T(C): 36.6 (10 Feb 2024 14:16), Max: 36.8 (10 Feb 2024 04:58)  T(F): 97.8 (10 Feb 2024 14:16), Max: 98.3 (10 Feb 2024 04:58)  HR: 82 (10 Feb 2024 14:16) (68 - 82)  BP: 125/70 (10 Feb 2024 14:16) (111/63 - 125/70)  BP(mean): --  RR: 18 (10 Feb 2024 14:16) (18 - 18)  SpO2: 93% (10 Feb 2024 14:16) (93% - 97%)    Parameters below as of 10 Feb 2024 14:16  Patient On (Oxygen Delivery Method): room air      I&O's Summary    09 Feb 2024 07:01  -  10 Feb 2024 07:00  --------------------------------------------------------  IN: 440 mL / OUT: 870 mL / NET: -430 mL        MEDICATIONS  (STANDING):  albuterol/ipratropium for Nebulization 3 milliLiter(s) Nebulizer every 6 hours  atorvastatin 40 milliGRAM(s) Oral at bedtime  azithromycin  IVPB 500 milliGRAM(s) IV Intermittent every 24 hours  azithromycin  IVPB      benzocaine/menthol Lozenge 1 Lozenge Oral four times a day  budesonide 160 MICROgram(s)/formoterol 4.5 MICROgram(s) Inhaler 2 Puff(s) Inhalation two times a day  cefTRIAXone   IVPB 1000 milliGRAM(s) IV Intermittent every 24 hours  escitalopram 20 milliGRAM(s) Oral at bedtime  heparin   Injectable 5000 Unit(s) SubCutaneous every 8 hours  influenza  Vaccine (HIGH DOSE) 0.7 milliLiter(s) IntraMuscular once  insulin lispro (ADMELOG) corrective regimen sliding scale   SubCutaneous at bedtime  insulin lispro (ADMELOG) corrective regimen sliding scale   SubCutaneous three times a day before meals  oseltamivir 30 milliGRAM(s) Oral every 24 hours  sodium bicarbonate 650 milliGRAM(s) Oral three times a day    MEDICATIONS  (PRN):  acetaminophen     Tablet .. 650 milliGRAM(s) Oral every 6 hours PRN Temp greater or equal to 38C (100.4F), Mild Pain (1 - 3)  aluminum hydroxide/magnesium hydroxide/simethicone Suspension 30 milliLiter(s) Oral every 4 hours PRN Dyspepsia  melatonin 3 milliGRAM(s) Oral at bedtime PRN Insomnia  ondansetron Injectable 4 milliGRAM(s) IV Push every 8 hours PRN Nausea and/or Vomiting    Allergies    No Known Allergies    Intolerances        CONSTITUTIONAL: No acute distress. Awake and alert.  RESPIRATORY: Slight crackles in the lower lung bases otherwise CTAB. No wheezes, rales, or rhonchi. No accessory muscle use. No apparent respiratory distress.  CARDIOVASCULAR: +S1/S2. No audible S3/S4. Regular rate and rhythm. No murmurs, rubs, or gallops. No LE swelling or edema.  GASTROINTESTINAL: Soft, nontender, nondistended. +BS. No rebound or guarding.   MUSCULOSKELETAL: Spontaneous movement in all extremities.  NEUROLOGICAL: CN 2-12 grossly intact. No focal deficits. Sensation intact x 4EXT.   PSYCHIATRIC: Appropriate affect. A&Ox3 (oriented to person, place, and time).                              8.4    6.45  )-----------( 310      ( 10 Feb 2024 07:27 )             26.2       02-10    134<L>  |  98  |  27<H>  ----------------------------<  310<H>  4.1   |  20<L>  |  1.54<H>    Ca    9.0      10 Feb 2024 07:27  Phos  3.7     02-09  Mg     1.7     02-09      CAPILLARY BLOOD GLUCOSE      POCT Blood Glucose.: 328 mg/dL (10 Feb 2024 12:29)  POCT Blood Glucose.: 306 mg/dL (10 Feb 2024 08:57)  POCT Blood Glucose.: 297 mg/dL (09 Feb 2024 21:24)  POCT Blood Glucose.: 286 mg/dL (09 Feb 2024 17:30)          Urinalysis Basic - ( 10 Feb 2024 07:27 )    Color: x / Appearance: x / SG: x / pH: x  Gluc: 310 mg/dL / Ketone: x  / Bili: x / Urobili: x   Blood: x / Protein: x / Nitrite: x   Leuk Esterase: x / RBC: x / WBC x   Sq Epi: x / Non Sq Epi: x / Bacteria: x            RADIOLOGY AND ADDITIONAL TESTS:    CONSULTANT NOTES REVIEWED:    CARE DISCUSSED WITH THE FOLLOWING CONSULTANTS/PROVIDERS:

## 2024-02-10 NOTE — PROGRESS NOTE ADULT - PROBLEM SELECTOR PLAN 4
- Requiring potter placed 2/7, no hx of urinary retention. S/p neobladder w/ surgically removed prostate  - optimize with OOBTC, regular bowel movements  - had bowel movements 2/8, undergoing TOV 2/9  - if fails, will need chronic Potter and OP urology referral

## 2024-02-10 NOTE — PROGRESS NOTE ADULT - PROBLEM SELECTOR PLAN 2
- Hx of ILD, possible ILD flare vs volume overload from resuscitation from septic shock on admission (received 4.5L). COVID and flu negative. On admission saturating % on RA, now requiring 2L NC, maintain 88-92%  - Pulm following - c/w CTX and azithro, adding Symbicort BID  - BNP 36534. Trial of diuresis (b/l plef and interseptal lobar thickening) - lasix 20 IV x 1 2/8, 2/9. Will give another trial today  - strict I&Os  - Monitor respiratory status Return to Emergency Department or contact your Neurosurgeon if any changes in mental status, severe headache, weakness, gait instability, seizures, numbness or tingling of extremities; difficulty swallowing; drainage or redness of wound, high fever, unrelenting vomiting; pain in legs; difficulty urinating or constipation.     Return to Emergency Department or contact your Neurosurgeon if any changes in mental status, severe headache, weakness, gait instability, seizures, numbness or tingling of extremities; difficulty swallowing; drainage or redness of wound, high fever, unrelenting vomiting; pain in legs; difficulty urinating or constipation.  Do not stop Aspirin & Plavix without discussion with Dr Moy   Return to Emergency Department or contact your Neurosurgeon if any changes in mental status, severe headache, weakness, gait instability, seizures, numbness or tingling of extremities; difficulty swallowing; drainage or redness of wound, high fever, unrelenting vomiting; pain in legs; difficulty urinating or constipation.  Plavix can be stopped  10/30/21 ( 3 weeks total)    Return to Emergency Department or contact your Neurosurgeon if any changes in mental status, severe headache, weakness, gait instability, seizures, numbness or tingling of extremities; difficulty swallowing; drainage or redness of wound, high fever, unrelenting vomiting; pain in legs; difficulty urinating or constipation.  Plavix can be stopped 10/30/21 (3 weeks total)

## 2024-02-10 NOTE — PROGRESS NOTE ADULT - ASSESSMENT
78M PMH of bladder tumor s/p resection, TURBT/TURP, diabetes, HTN, BPH, lung cancer, who presented to the ED due to fever and confusion. Originally admitted to MICU for urosepsis requiring pressor support. Now titrated off pressors and downgraded to medicine service for further management.

## 2024-02-10 NOTE — PROGRESS NOTE ADULT - PROBLEM SELECTOR PLAN 9
- hold home ARB due to hypotension requiring pressors   - monitor BP - can resume if tolerates Lasix and BP remains stable

## 2024-02-11 LAB
ANION GAP SERPL CALC-SCNC: 14 MMOL/L — SIGNIFICANT CHANGE UP (ref 5–17)
BUN SERPL-MCNC: 29 MG/DL — HIGH (ref 7–23)
CALCIUM SERPL-MCNC: 9.4 MG/DL — SIGNIFICANT CHANGE UP (ref 8.4–10.5)
CHLORIDE SERPL-SCNC: 98 MMOL/L — SIGNIFICANT CHANGE UP (ref 96–108)
CO2 SERPL-SCNC: 22 MMOL/L — SIGNIFICANT CHANGE UP (ref 22–31)
CREAT SERPL-MCNC: 1.42 MG/DL — HIGH (ref 0.5–1.3)
EGFR: 51 ML/MIN/1.73M2 — LOW
GLUCOSE BLDC GLUCOMTR-MCNC: 245 MG/DL — HIGH (ref 70–99)
GLUCOSE BLDC GLUCOMTR-MCNC: 271 MG/DL — HIGH (ref 70–99)
GLUCOSE BLDC GLUCOMTR-MCNC: 297 MG/DL — HIGH (ref 70–99)
GLUCOSE BLDC GLUCOMTR-MCNC: 311 MG/DL — HIGH (ref 70–99)
GLUCOSE BLDC GLUCOMTR-MCNC: 323 MG/DL — HIGH (ref 70–99)
GLUCOSE BLDC GLUCOMTR-MCNC: 347 MG/DL — HIGH (ref 70–99)
GLUCOSE BLDC GLUCOMTR-MCNC: 356 MG/DL — HIGH (ref 70–99)
GLUCOSE SERPL-MCNC: 267 MG/DL — HIGH (ref 70–99)
HCT VFR BLD CALC: 26.5 % — LOW (ref 39–50)
HGB BLD-MCNC: 8.6 G/DL — LOW (ref 13–17)
MCHC RBC-ENTMCNC: 30.1 PG — SIGNIFICANT CHANGE UP (ref 27–34)
MCHC RBC-ENTMCNC: 32.5 GM/DL — SIGNIFICANT CHANGE UP (ref 32–36)
MCV RBC AUTO: 92.7 FL — SIGNIFICANT CHANGE UP (ref 80–100)
NRBC # BLD: 0 /100 WBCS — SIGNIFICANT CHANGE UP (ref 0–0)
PLATELET # BLD AUTO: 330 K/UL — SIGNIFICANT CHANGE UP (ref 150–400)
POTASSIUM SERPL-MCNC: 4.1 MMOL/L — SIGNIFICANT CHANGE UP (ref 3.5–5.3)
POTASSIUM SERPL-SCNC: 4.1 MMOL/L — SIGNIFICANT CHANGE UP (ref 3.5–5.3)
RBC # BLD: 2.86 M/UL — LOW (ref 4.2–5.8)
RBC # FLD: 13.3 % — SIGNIFICANT CHANGE UP (ref 10.3–14.5)
SODIUM SERPL-SCNC: 134 MMOL/L — LOW (ref 135–145)
WBC # BLD: 7.1 K/UL — SIGNIFICANT CHANGE UP (ref 3.8–10.5)
WBC # FLD AUTO: 7.1 K/UL — SIGNIFICANT CHANGE UP (ref 3.8–10.5)

## 2024-02-11 PROCEDURE — 99233 SBSQ HOSP IP/OBS HIGH 50: CPT

## 2024-02-11 RX ORDER — INSULIN GLARGINE 100 [IU]/ML
7 INJECTION, SOLUTION SUBCUTANEOUS AT BEDTIME
Refills: 0 | Status: DISCONTINUED | OUTPATIENT
Start: 2024-02-11 | End: 2024-02-12

## 2024-02-11 RX ORDER — FUROSEMIDE 40 MG
20 TABLET ORAL ONCE
Refills: 0 | Status: COMPLETED | OUTPATIENT
Start: 2024-02-11 | End: 2024-02-11

## 2024-02-11 RX ORDER — INSULIN LISPRO 100/ML
4 VIAL (ML) SUBCUTANEOUS
Refills: 0 | Status: DISCONTINUED | OUTPATIENT
Start: 2024-02-11 | End: 2024-02-12

## 2024-02-11 RX ORDER — INSULIN LISPRO 100/ML
5 VIAL (ML) SUBCUTANEOUS ONCE
Refills: 0 | Status: COMPLETED | OUTPATIENT
Start: 2024-02-11 | End: 2024-02-11

## 2024-02-11 RX ADMIN — Medication 5 UNIT(S): at 03:31

## 2024-02-11 RX ADMIN — Medication 1 TABLET(S): at 11:33

## 2024-02-11 RX ADMIN — BENZOCAINE AND MENTHOL 1 LOZENGE: 5; 1 LIQUID ORAL at 23:31

## 2024-02-11 RX ADMIN — Medication 650 MILLIGRAM(S): at 21:31

## 2024-02-11 RX ADMIN — Medication 4: at 22:11

## 2024-02-11 RX ADMIN — HEPARIN SODIUM 5000 UNIT(S): 5000 INJECTION INTRAVENOUS; SUBCUTANEOUS at 21:30

## 2024-02-11 RX ADMIN — Medication 3: at 08:48

## 2024-02-11 RX ADMIN — ESCITALOPRAM OXALATE 20 MILLIGRAM(S): 10 TABLET, FILM COATED ORAL at 21:31

## 2024-02-11 RX ADMIN — Medication 200 MILLIGRAM(S): at 17:18

## 2024-02-11 RX ADMIN — Medication 3 MILLILITER(S): at 23:30

## 2024-02-11 RX ADMIN — Medication 3 MILLILITER(S): at 05:52

## 2024-02-11 RX ADMIN — Medication 20 MILLIGRAM(S): at 11:33

## 2024-02-11 RX ADMIN — Medication 650 MILLIGRAM(S): at 12:41

## 2024-02-11 RX ADMIN — HEPARIN SODIUM 5000 UNIT(S): 5000 INJECTION INTRAVENOUS; SUBCUTANEOUS at 12:40

## 2024-02-11 RX ADMIN — BUDESONIDE AND FORMOTEROL FUMARATE DIHYDRATE 2 PUFF(S): 160; 4.5 AEROSOL RESPIRATORY (INHALATION) at 05:52

## 2024-02-11 RX ADMIN — Medication 5: at 17:18

## 2024-02-11 RX ADMIN — BENZOCAINE AND MENTHOL 1 LOZENGE: 5; 1 LIQUID ORAL at 17:18

## 2024-02-11 RX ADMIN — ATORVASTATIN CALCIUM 40 MILLIGRAM(S): 80 TABLET, FILM COATED ORAL at 21:31

## 2024-02-11 RX ADMIN — Medication 8: at 00:00

## 2024-02-11 RX ADMIN — Medication 3 MILLILITER(S): at 11:33

## 2024-02-11 RX ADMIN — Medication 30 MILLIGRAM(S): at 12:40

## 2024-02-11 RX ADMIN — BUDESONIDE AND FORMOTEROL FUMARATE DIHYDRATE 2 PUFF(S): 160; 4.5 AEROSOL RESPIRATORY (INHALATION) at 17:19

## 2024-02-11 RX ADMIN — INSULIN GLARGINE 7 UNIT(S): 100 INJECTION, SOLUTION SUBCUTANEOUS at 22:09

## 2024-02-11 RX ADMIN — Medication 3: at 13:24

## 2024-02-11 RX ADMIN — HEPARIN SODIUM 5000 UNIT(S): 5000 INJECTION INTRAVENOUS; SUBCUTANEOUS at 05:51

## 2024-02-11 RX ADMIN — BENZOCAINE AND MENTHOL 1 LOZENGE: 5; 1 LIQUID ORAL at 05:51

## 2024-02-11 RX ADMIN — BENZOCAINE AND MENTHOL 1 LOZENGE: 5; 1 LIQUID ORAL at 11:33

## 2024-02-11 RX ADMIN — CEFTRIAXONE 100 MILLIGRAM(S): 500 INJECTION, POWDER, FOR SOLUTION INTRAMUSCULAR; INTRAVENOUS at 21:30

## 2024-02-11 RX ADMIN — Medication 650 MILLIGRAM(S): at 05:52

## 2024-02-11 RX ADMIN — Medication 3 MILLILITER(S): at 17:19

## 2024-02-11 RX ADMIN — AZITHROMYCIN 255 MILLIGRAM(S): 500 TABLET, FILM COATED ORAL at 08:47

## 2024-02-11 NOTE — CHART NOTE - NSCHARTNOTEFT_GEN_A_CORE
medicine PA note     patient was hyperglycemia of blg 425. Patient admits of having snacks and had eaten chocolates. patient was educated on the risk of not following a diabetic diet.     Will endorse day team  continue to monitor finger stick    Dept of medicine  Er[facility]

## 2024-02-11 NOTE — PROGRESS NOTE ADULT - PROBLEM SELECTOR PLAN 8
- Hold PO meds and continue SSI while in hospital  - monitor BS and adjust insulin as needed  - Trend FS (goal -180)

## 2024-02-11 NOTE — PROGRESS NOTE ADULT - PROBLEM SELECTOR PLAN 3
- Admitted to MICU initially due to likely septic shock from UTI, requiring pressors, now off  - Monitor blood pressure, holding home olmesartan  - Urine cx growing E. coli - c/w ceftriaxone

## 2024-02-11 NOTE — PROGRESS NOTE ADULT - PROBLEM SELECTOR PLAN 5
- Noted to have creatinine of 3 on admission, ATN from shock vs POLINA from urinary retention/UTI or combination  - Cr stable in the 1-4-1.5 range  - Monitor BMP daily  - Avoid nephrotoxins, renally dose all medications

## 2024-02-11 NOTE — PROGRESS NOTE ADULT - SUBJECTIVE AND OBJECTIVE BOX
[facility] - Division of Pulmonary, Critical Care and Sleep Medicine   Please call [phone] between 8am-pm weekdays, [phone] after hours and weekends    Interval Events: No events overnight, got into the chair yesterday, weaned off supplemental oxygen. Minimal cough, no chest pain or dyspnea    REVIEW OF SYSTEMS:  CV: [ ] chest pain   Resp: [ ] cough [ ] shortness of breath   [x ] All other systems negative  [ ] Unable to assess ROS because ________    OBJECTIVE:  ICU Vital Signs Last 24 Hrs  T(C): 36.7 (11 Feb 2024 05:23), Max: 36.7 (10 Feb 2024 20:18)  T(F): 98.1 (11 Feb 2024 05:23), Max: 98.1 (11 Feb 2024 05:23)  HR: 63 (11 Feb 2024 05:23) (63 - 82)  BP: 136/64 (11 Feb 2024 05:23) (110/63 - 136/64)  BP(mean): --  ABP: --  ABP(mean): --  RR: 18 (11 Feb 2024 05:23) (18 - 18)  SpO2: 96% (11 Feb 2024 05:23) (93% - 96%)    O2 Parameters below as of 11 Feb 2024 05:23  Patient On (Oxygen Delivery Method): room air              02-10 @ 07:01  -  02-11 @ 07:00  --------------------------------------------------------  IN: 240 mL / OUT: 230 mL / NET: 10 mL      CAPILLARY BLOOD GLUCOSE      POCT Blood Glucose.: 271 mg/dL (11 Feb 2024 08:27)      PHYSICAL EXAM:  General: NAD  HEENT: NC/AT  Neck: supple  Respiratory:  CTA b/l, no wheezes, crackles or rhonchi  Cardiovascular:  RRR, no m/r/g  Abdomen: soft, NT/ND, +BS  Extremities: no clubbing, cyanosis or edema, warm  Skin: no rash  Neurological: AAOx3, non focal exam  Psychiatry: not anxious appearing, normal affect and mood    HOSPITAL MEDICATIONS:  MEDICATIONS  (STANDING):  albuterol/ipratropium for Nebulization 3 milliLiter(s) Nebulizer every 6 hours  atorvastatin 40 milliGRAM(s) Oral at bedtime  azithromycin  IVPB      azithromycin  IVPB 500 milliGRAM(s) IV Intermittent every 24 hours  benzocaine/menthol Lozenge 1 Lozenge Oral four times a day  budesonide 160 MICROgram(s)/formoterol 4.5 MICROgram(s) Inhaler 2 Puff(s) Inhalation two times a day  cefTRIAXone   IVPB 1000 milliGRAM(s) IV Intermittent every 24 hours  escitalopram 20 milliGRAM(s) Oral at bedtime  heparin   Injectable 5000 Unit(s) SubCutaneous every 8 hours  influenza  Vaccine (HIGH DOSE) 0.7 milliLiter(s) IntraMuscular once  insulin lispro (ADMELOG) corrective regimen sliding scale   SubCutaneous at bedtime  insulin lispro (ADMELOG) corrective regimen sliding scale   SubCutaneous three times a day before meals  multivitamin 1 Tablet(s) Oral daily  oseltamivir 30 milliGRAM(s) Oral every 24 hours  sodium bicarbonate 650 milliGRAM(s) Oral three times a day    MEDICATIONS  (PRN):  acetaminophen     Tablet .. 650 milliGRAM(s) Oral every 6 hours PRN Temp greater or equal to 38C (100.4F), Mild Pain (1 - 3)  aluminum hydroxide/magnesium hydroxide/simethicone Suspension 30 milliLiter(s) Oral every 4 hours PRN Dyspepsia  melatonin 3 milliGRAM(s) Oral at bedtime PRN Insomnia  ondansetron Injectable 4 milliGRAM(s) IV Push every 8 hours PRN Nausea and/or Vomiting      LABS:                        8.6    7.10  )-----------( 330      ( 11 Feb 2024 07:17 )             26.5     Hgb Trend: 8.6<--, 8.5<--, 8.4<--, 9.0<--, 8.9<--  02-11    134<L>  |  98  |  29<H>  ----------------------------<  267<H>  4.1   |  22  |  1.42<H>    Ca    9.4      11 Feb 2024 07:16      Creatinine Trend: 1.42<--, 1.54<--, 1.49<--, 1.59<--, 2.00<--, 2.66<--    Urinalysis Basic - ( 11 Feb 2024 07:16 )    Color: x / Appearance: x / SG: x / pH: x  Gluc: 267 mg/dL / Ketone: x  / Bili: x / Urobili: x   Blood: x / Protein: x / Nitrite: x   Leuk Esterase: x / RBC: x / WBC x   Sq Epi: x / Non Sq Epi: x / Bacteria: x            MICROBIOLOGY:     RADIOLOGY:  [ x] Reviewed and interpreted by me

## 2024-02-11 NOTE — PROGRESS NOTE ADULT - SUBJECTIVE AND OBJECTIVE BOX
Contact Information:  Devaughn Love II, MD, MPH  Internal Medicine    VINNY SUGGS, MRN-[id number]    Patient is a 78y old  Male who presents with a chief complaint of Hypotension (11 Feb 2024 09:48)      OVERNIGHT EVENTS/INTERVAL/SUBJECTIVE: Patient evaluated at bedside, states that he feels slightly short of breath after the ambulatory oxygen test; he also has some intermittent hemoptysis. He denies fever, lightheadedness, dizziness, CP, ABD pain, numbness, tingling. diarrhea.    CONSTITUTIONAL: No weakness. No fatigue. No fever.  HEAD: No head trauma.   EYES: No vision changes.  ENT: No hearing changes or tinnitus. No ear pain. No changes in smell. No nasal congestion or discharge. No sore throat. No voice hoarseness.   NECK: No neck pain or stiffness. No lumps.  RESPIRATORY: +SOB. +Hemoptysis. No cough. No SOB. No wheezing.    CARDIOVASCULAR: No chest pain. No palpitations.   GASTROINTESTINAL: No dysphagia. No ABD pain. No distension. No constipation. No diarrhea. No pain with defecation. No hematemesis. No hematochezia or melena.  BACK: No back pain.  GENITOURINARY: No dysuria. No frequency or urgency. No hesitancy. No incontinence. No urinary retention. No suprapubic pain. No hematuria.  EXTREMITY: No swelling.  MUSCULOSKELETAL: No joint pain or swelling. No fractures. No stiffness.    SKIN: No rashes. No itching. No skin, hair, or nail changes.  NEUROLOGICAL: No weakness or paralysis. No lightheadedness or dizziness. No HA. No numbness or tingling.   PSYCHIATRIC: No depression.       OBJECTIVE:  Vital Signs Last 24 Hrs  T(C): 36.3 (11 Feb 2024 12:47), Max: 36.7 (10 Feb 2024 20:18)  T(F): 97.4 (11 Feb 2024 12:47), Max: 98.1 (11 Feb 2024 05:23)  HR: 61 (11 Feb 2024 12:47) (61 - 80)  BP: 145/70 (11 Feb 2024 12:47) (110/63 - 145/70)  BP(mean): --  RR: 18 (11 Feb 2024 12:47) (18 - 20)  SpO2: 95% (11 Feb 2024 12:47) (90% - 96%)    Parameters below as of 11 Feb 2024 12:47  Patient On (Oxygen Delivery Method): nasal cannula  O2 Flow (L/min): 2    I&O's Summary    10 Feb 2024 07:01  -  11 Feb 2024 07:00  --------------------------------------------------------  IN: 240 mL / OUT: 230 mL / NET: 10 mL    11 Feb 2024 07:01  -  11 Feb 2024 20:11  --------------------------------------------------------  IN: 480 mL / OUT: 0 mL / NET: 480 mL        MEDICATIONS  (STANDING):  albuterol/ipratropium for Nebulization 3 milliLiter(s) Nebulizer every 6 hours  atorvastatin 40 milliGRAM(s) Oral at bedtime  azithromycin  IVPB 500 milliGRAM(s) IV Intermittent every 24 hours  azithromycin  IVPB      benzocaine/menthol Lozenge 1 Lozenge Oral four times a day  budesonide 160 MICROgram(s)/formoterol 4.5 MICROgram(s) Inhaler 2 Puff(s) Inhalation two times a day  cefTRIAXone   IVPB 1000 milliGRAM(s) IV Intermittent every 24 hours  escitalopram 20 milliGRAM(s) Oral at bedtime  heparin   Injectable 5000 Unit(s) SubCutaneous every 8 hours  influenza  Vaccine (HIGH DOSE) 0.7 milliLiter(s) IntraMuscular once  insulin lispro (ADMELOG) corrective regimen sliding scale   SubCutaneous at bedtime  insulin lispro (ADMELOG) corrective regimen sliding scale   SubCutaneous three times a day before meals  multivitamin 1 Tablet(s) Oral daily  oseltamivir 30 milliGRAM(s) Oral every 24 hours  sodium bicarbonate 650 milliGRAM(s) Oral three times a day    MEDICATIONS  (PRN):  acetaminophen     Tablet .. 650 milliGRAM(s) Oral every 6 hours PRN Temp greater or equal to 38C (100.4F), Mild Pain (1 - 3)  aluminum hydroxide/magnesium hydroxide/simethicone Suspension 30 milliLiter(s) Oral every 4 hours PRN Dyspepsia  guaiFENesin Oral Liquid (Sugar-Free) 200 milliGRAM(s) Oral every 6 hours PRN Cough  melatonin 3 milliGRAM(s) Oral at bedtime PRN Insomnia  ondansetron Injectable 4 milliGRAM(s) IV Push every 8 hours PRN Nausea and/or Vomiting    Allergies    No Known Allergies    Intolerances        CONSTITUTIONAL: No acute distress. Awake and alert.  ENT: NC in place.  RESPIRATORY: Minimal crackling on lower posterior lung fields, otherwise CTAB. No wheezes, rales, or rhonchi. No accessory muscle use. No apparent respiratory distress.  CARDIOVASCULAR: +S1/S2. No audible S3/S4. Regular rate and rhythm. No murmurs, rubs, or gallops. No LE swelling or edema.  GASTROINTESTINAL: Soft, nontender, nondistended. +BS. No rebound or guarding.   MUSCULOSKELETAL: Spontaneous movement in all extremities.  NEUROLOGICAL: CN 2-12 grossly intact. No focal deficits. Sensation intact x 4EXT.   PSYCHIATRIC: Appropriate affect. A&Ox3 (oriented to person, place, and time).                              8.6    7.10  )-----------( 330      ( 11 Feb 2024 07:17 )             26.5       02-11    134<L>  |  98  |  29<H>  ----------------------------<  267<H>  4.1   |  22  |  1.42<H>    Ca    9.4      11 Feb 2024 07:16      CAPILLARY BLOOD GLUCOSE      POCT Blood Glucose.: 356 mg/dL (11 Feb 2024 17:14)  POCT Blood Glucose.: 297 mg/dL (11 Feb 2024 13:11)  POCT Blood Glucose.: 271 mg/dL (11 Feb 2024 08:27)  POCT Blood Glucose.: 245 mg/dL (11 Feb 2024 06:41)  POCT Blood Glucose.: 311 mg/dL (11 Feb 2024 03:27)  POCT Blood Glucose.: 347 mg/dL (11 Feb 2024 01:57)  POCT Blood Glucose.: 425 mg/dL (10 Feb 2024 23:50)  POCT Blood Glucose.: 345 mg/dL (10 Feb 2024 21:30)          Urinalysis Basic - ( 11 Feb 2024 07:16 )    Color: x / Appearance: x / SG: x / pH: x  Gluc: 267 mg/dL / Ketone: x  / Bili: x / Urobili: x   Blood: x / Protein: x / Nitrite: x   Leuk Esterase: x / RBC: x / WBC x   Sq Epi: x / Non Sq Epi: x / Bacteria: x            RADIOLOGY AND ADDITIONAL TESTS:    CONSULTANT NOTES REVIEWED:    CARE DISCUSSED WITH THE FOLLOWING CONSULTANTS/PROVIDERS:

## 2024-02-11 NOTE — PROGRESS NOTE ADULT - PROBLEM SELECTOR PLAN 2
- Hx of ILD, previously followed with Dr. Mcpherson @ [place] but has not seen in the last 3 years  - prior imaging reports placed in paper chart  - Pulm consulted, recs as above

## 2024-02-11 NOTE — PROGRESS NOTE ADULT - ASSESSMENT
78M with extensive smoking history, lung cancer, ILD a/w urosepsis/shock and acute hypoxemic respiratory failure.  Urine culture with pan sensitive Ecoli, POLINA improving.    Radiology / Imaging- CT Chest (my read): worsening fibrosis B/L bases in bibasilar and subpleural distribution pattern. Diffuse GGO throughout rest of lung fields, no specific craniocaudal distribution. Emphysema throughout. Significantly worse than prior CT done in 2008, but also significantly worse than scans reviewed from outside institutions in 2022 and 12/2023.    - Given that the CT looked significantly better only 6 weeks before, the time course seems unlikely for progressive decompensation due to patient's pre-existing lung disease. Rather, it appears to be something more acute: infection vs fluid overload vs acute exacerbation of ILD. It is unclear if the fibrosis is true fibrosis, or if fluid or infection has made the existing lung parenchyma more opacified in the setting of emphysema, making the area look fibrosed.  - c/w Ceftriaxone and Azithro, Duonebs Q6 hours, Symbicort 160/4.5 mg BID  - RVP negative, Procalcitonin elevated  - While generally appears euvolemic, would cont to diurese  - Weaned off supplemental oxygen at rest, please check ambulatory pulse oximetry  -DVT ppx, incentive spirometry  -Outpatient pulmonary follow up with repeat CT chest at 4-6 weeks  Out of bed to chair    I discussed above with the patient and his family at length, all questions answered       Attestation Statements:   Attestation Statements:  Time-based billing (NON-critical care).     50 minutes spent on total encounter. The necessity of the time spent during the encounter on this date of service was due to:     Review of imaging, labs, EMR, medical management and discussion with patient, family and NP.

## 2024-02-12 ENCOUNTER — TRANSCRIPTION ENCOUNTER (OUTPATIENT)
Age: 79
End: 2024-02-12

## 2024-02-12 VITALS
HEART RATE: 61 BPM | SYSTOLIC BLOOD PRESSURE: 136 MMHG | RESPIRATION RATE: 18 BRPM | DIASTOLIC BLOOD PRESSURE: 70 MMHG | OXYGEN SATURATION: 96 % | TEMPERATURE: 98 F

## 2024-02-12 LAB
GLUCOSE BLDC GLUCOMTR-MCNC: 165 MG/DL — HIGH (ref 70–99)
GLUCOSE BLDC GLUCOMTR-MCNC: 282 MG/DL — HIGH (ref 70–99)

## 2024-02-12 PROCEDURE — 93306 TTE W/DOPPLER COMPLETE: CPT

## 2024-02-12 PROCEDURE — 80048 BASIC METABOLIC PNL TOTAL CA: CPT

## 2024-02-12 PROCEDURE — 84100 ASSAY OF PHOSPHORUS: CPT

## 2024-02-12 PROCEDURE — 81001 URINALYSIS AUTO W/SCOPE: CPT

## 2024-02-12 PROCEDURE — 82947 ASSAY GLUCOSE BLOOD QUANT: CPT

## 2024-02-12 PROCEDURE — 84300 ASSAY OF URINE SODIUM: CPT

## 2024-02-12 PROCEDURE — 87040 BLOOD CULTURE FOR BACTERIA: CPT

## 2024-02-12 PROCEDURE — 84295 ASSAY OF SERUM SODIUM: CPT

## 2024-02-12 PROCEDURE — 76376 3D RENDER W/INTRP POSTPROCES: CPT

## 2024-02-12 PROCEDURE — 99239 HOSP IP/OBS DSCHRG MGMT >30: CPT

## 2024-02-12 PROCEDURE — 83540 ASSAY OF IRON: CPT

## 2024-02-12 PROCEDURE — 36415 COLL VENOUS BLD VENIPUNCTURE: CPT

## 2024-02-12 PROCEDURE — 99285 EMERGENCY DEPT VISIT HI MDM: CPT

## 2024-02-12 PROCEDURE — 85610 PROTHROMBIN TIME: CPT

## 2024-02-12 PROCEDURE — 82803 BLOOD GASES ANY COMBINATION: CPT

## 2024-02-12 PROCEDURE — 83735 ASSAY OF MAGNESIUM: CPT

## 2024-02-12 PROCEDURE — 85014 HEMATOCRIT: CPT

## 2024-02-12 PROCEDURE — 83605 ASSAY OF LACTIC ACID: CPT

## 2024-02-12 PROCEDURE — 85730 THROMBOPLASTIN TIME PARTIAL: CPT

## 2024-02-12 PROCEDURE — 82330 ASSAY OF CALCIUM: CPT

## 2024-02-12 PROCEDURE — 82570 ASSAY OF URINE CREATININE: CPT

## 2024-02-12 PROCEDURE — 84145 PROCALCITONIN (PCT): CPT

## 2024-02-12 PROCEDURE — 83880 ASSAY OF NATRIURETIC PEPTIDE: CPT

## 2024-02-12 PROCEDURE — 87086 URINE CULTURE/COLONY COUNT: CPT

## 2024-02-12 PROCEDURE — 84132 ASSAY OF SERUM POTASSIUM: CPT

## 2024-02-12 PROCEDURE — 86900 BLOOD TYPING SEROLOGIC ABO: CPT

## 2024-02-12 PROCEDURE — 97161 PT EVAL LOW COMPLEX 20 MIN: CPT

## 2024-02-12 PROCEDURE — 83930 ASSAY OF BLOOD OSMOLALITY: CPT

## 2024-02-12 PROCEDURE — 87637 SARSCOV2&INF A&B&RSV AMP PRB: CPT

## 2024-02-12 PROCEDURE — 83550 IRON BINDING TEST: CPT

## 2024-02-12 PROCEDURE — 82272 OCCULT BLD FECES 1-3 TESTS: CPT

## 2024-02-12 PROCEDURE — 82728 ASSAY OF FERRITIN: CPT

## 2024-02-12 PROCEDURE — 85025 COMPLETE CBC W/AUTO DIFF WBC: CPT

## 2024-02-12 PROCEDURE — 82962 GLUCOSE BLOOD TEST: CPT

## 2024-02-12 PROCEDURE — 86850 RBC ANTIBODY SCREEN: CPT

## 2024-02-12 PROCEDURE — 99233 SBSQ HOSP IP/OBS HIGH 50: CPT

## 2024-02-12 PROCEDURE — 87186 SC STD MICRODIL/AGAR DIL: CPT

## 2024-02-12 PROCEDURE — 94640 AIRWAY INHALATION TREATMENT: CPT

## 2024-02-12 PROCEDURE — 80053 COMPREHEN METABOLIC PANEL: CPT

## 2024-02-12 PROCEDURE — 86901 BLOOD TYPING SEROLOGIC RH(D): CPT

## 2024-02-12 PROCEDURE — 85027 COMPLETE CBC AUTOMATED: CPT

## 2024-02-12 PROCEDURE — 85018 HEMOGLOBIN: CPT

## 2024-02-12 PROCEDURE — 71250 CT THORAX DX C-: CPT

## 2024-02-12 PROCEDURE — 82435 ASSAY OF BLOOD CHLORIDE: CPT

## 2024-02-12 PROCEDURE — 96374 THER/PROPH/DIAG INJ IV PUSH: CPT

## 2024-02-12 PROCEDURE — 83935 ASSAY OF URINE OSMOLALITY: CPT

## 2024-02-12 RX ORDER — BUDESONIDE AND FORMOTEROL FUMARATE DIHYDRATE 160; 4.5 UG/1; UG/1
1 AEROSOL RESPIRATORY (INHALATION)
Qty: 1 | Refills: 0
Start: 2024-02-12 | End: 2024-03-12

## 2024-02-12 RX ORDER — OLMESARTAN MEDOXOMIL 5 MG/1
1 TABLET, FILM COATED ORAL
Qty: 30 | Refills: 0
Start: 2024-02-12 | End: 2024-03-12

## 2024-02-12 RX ORDER — CEFPODOXIME PROXETIL 100 MG
1 TABLET ORAL
Qty: 6 | Refills: 0
Start: 2024-02-12 | End: 2024-02-14

## 2024-02-12 RX ORDER — ZINC GLUCONATE 30 MG
1 TABLET ORAL
Refills: 0 | DISCHARGE

## 2024-02-12 RX ORDER — OLMESARTAN MEDOXOMIL 5 MG/1
1 TABLET, FILM COATED ORAL
Refills: 0 | DISCHARGE

## 2024-02-12 RX ADMIN — Medication 3 MILLILITER(S): at 11:55

## 2024-02-12 RX ADMIN — Medication 4 UNIT(S): at 09:19

## 2024-02-12 RX ADMIN — Medication 30 MILLIGRAM(S): at 11:54

## 2024-02-12 RX ADMIN — BENZOCAINE AND MENTHOL 1 LOZENGE: 5; 1 LIQUID ORAL at 05:47

## 2024-02-12 RX ADMIN — Medication 1 TABLET(S): at 11:54

## 2024-02-12 RX ADMIN — Medication 3: at 09:18

## 2024-02-12 RX ADMIN — Medication 4 UNIT(S): at 12:39

## 2024-02-12 RX ADMIN — BENZOCAINE AND MENTHOL 1 LOZENGE: 5; 1 LIQUID ORAL at 11:53

## 2024-02-12 RX ADMIN — Medication 650 MILLIGRAM(S): at 05:47

## 2024-02-12 RX ADMIN — HEPARIN SODIUM 5000 UNIT(S): 5000 INJECTION INTRAVENOUS; SUBCUTANEOUS at 05:46

## 2024-02-12 RX ADMIN — HEPARIN SODIUM 5000 UNIT(S): 5000 INJECTION INTRAVENOUS; SUBCUTANEOUS at 11:53

## 2024-02-12 RX ADMIN — Medication 650 MILLIGRAM(S): at 11:55

## 2024-02-12 RX ADMIN — AZITHROMYCIN 255 MILLIGRAM(S): 500 TABLET, FILM COATED ORAL at 09:19

## 2024-02-12 RX ADMIN — BUDESONIDE AND FORMOTEROL FUMARATE DIHYDRATE 2 PUFF(S): 160; 4.5 AEROSOL RESPIRATORY (INHALATION) at 05:47

## 2024-02-12 RX ADMIN — Medication 3 MILLILITER(S): at 05:46

## 2024-02-12 RX ADMIN — Medication 1: at 12:39

## 2024-02-12 NOTE — PROGRESS NOTE ADULT - PROBLEM SELECTOR PLAN 1
- Admitted to MICU initially due to likely septic shock from UTI, requiring pressors, now off  - Monitor blood pressure, holding home olmesartan  - Urine cx growing E. coli - switched from zosyn to ceftriaxone
- Hx of ILD, possible ILD flare vs volume overload from resuscitation from septic shock on admission (received 4.5L). COVID and flu negative.  - Some hemoptysis noted on 2/10 and 2/11 - Pulm aware, will continue to monitor  - Ambulatory saturation test 2/11 - 95% at rest, 90% on ambulation, 95% at rest after exertion  - Pulm following - c/w CTX and azithro, adding Symbicort BID  - BNP 54683. Trial of diuresis (b/l plef and interseptal lobar thickening) - Lasix 20 IV x 1 2/8-2/11. Diuresis as needed  - strict I&Os  - Monitor respiratory status
- Hx of ILD, possible ILD flare vs volume overload from resuscitation from septic shock on admission (received 4.5L). COVID and flu negative. On admission saturating % on RA, now requiring 2L NC, maintain 88-92%  - Some hemoptysis noted on 2/10 and 2/11 - Pulm aware, will continue to monitor  - Ambulatory saturation test 2/11 - 95% at rest, 90% on ambulation, 95% at rest after exertion  - Pulm following - c/w CTX and azithro, adding Symbicort BID  - BNP 54534. Trial of diuresis (b/l plef and interseptal lobar thickening) - Lasix 20 IV x 1 2/8-2/11. Diuresis as needed  - strict I&Os  - Monitor respiratory status
- admitted to MICU initially due to likely septic shock from UTI  - now off pressors and initially started on midodrine but changed to droxidopa due to asymptomatic bradycardia  - home olmesartan currently on hold  - stopped northera as pt hypertensive  - urine cx growing E. coli; f/u final sensitivities   - continue with zosyn for now
- admitted to MICU initially due to likely septic shock from UTI  - now off pressors  - home olmesartan currently on hold  - urine cx growing E. coli- switched from zosyn to ceftriaxone  - f/u ID final recs
- admitted to MICU initially due to likely septic shock from UTI  - now off pressors and initially started on midodrine but changed to droxidopa due to asymptomatic bradycardia  - home olmesartan currently on hold  - monitor BP and titrate droxidopa as tolerable   - urine cx growing E. coli; f/u final sensitivities   - continue with zosyn for now

## 2024-02-12 NOTE — DISCHARGE NOTE NURSING/CASE MANAGEMENT/SOCIAL WORK - NSDCPEFALRISK_GEN_ALL_CORE
For information on Fall & Injury Prevention, visit: [email or web address] OR  [email or web address] OR  https://www.cdc.gov/steadi/patient.html

## 2024-02-12 NOTE — PROGRESS NOTE ADULT - SUBJECTIVE AND OBJECTIVE BOX
[facility] Division of Hospital Medicine  Bryan Gregory DO  Reachable on [employer] Teams    Patient is a 78y old  Male who presents with a chief complaint of Hypotension (12 Feb 2024 12:03)    SUBJECTIVE / OVERNIGHT EVENTS: No acute events overnight. Patient seen and examined at bedside this morning, unable to obtain review of systems as patient hard of hearing. Attempted to speak with patient in English and with Niuean  Prabha [phone] without success.    ADDITIONAL REVIEW OF SYSTEMS: Unable to obtain.    MEDICATIONS  (STANDING):  albuterol/ipratropium for Nebulization 3 milliLiter(s) Nebulizer every 6 hours  atorvastatin 40 milliGRAM(s) Oral at bedtime  azithromycin  IVPB 500 milliGRAM(s) IV Intermittent every 24 hours  azithromycin  IVPB      benzocaine/menthol Lozenge 1 Lozenge Oral four times a day  budesonide 160 MICROgram(s)/formoterol 4.5 MICROgram(s) Inhaler 2 Puff(s) Inhalation two times a day  cefTRIAXone   IVPB 1000 milliGRAM(s) IV Intermittent every 24 hours  escitalopram 20 milliGRAM(s) Oral at bedtime  heparin   Injectable 5000 Unit(s) SubCutaneous every 8 hours  influenza  Vaccine (HIGH DOSE) 0.7 milliLiter(s) IntraMuscular once  insulin glargine Injectable (LANTUS) 7 Unit(s) SubCutaneous at bedtime  insulin lispro (ADMELOG) corrective regimen sliding scale   SubCutaneous at bedtime  insulin lispro (ADMELOG) corrective regimen sliding scale   SubCutaneous three times a day before meals  insulin lispro Injectable (ADMELOG) 4 Unit(s) SubCutaneous three times a day before meals  multivitamin 1 Tablet(s) Oral daily  oseltamivir 30 milliGRAM(s) Oral every 24 hours  sodium bicarbonate 650 milliGRAM(s) Oral three times a day    MEDICATIONS  (PRN):  acetaminophen     Tablet .. 650 milliGRAM(s) Oral every 6 hours PRN Temp greater or equal to 38C (100.4F), Mild Pain (1 - 3)  aluminum hydroxide/magnesium hydroxide/simethicone Suspension 30 milliLiter(s) Oral every 4 hours PRN Dyspepsia  guaiFENesin Oral Liquid (Sugar-Free) 200 milliGRAM(s) Oral every 6 hours PRN Cough  melatonin 3 milliGRAM(s) Oral at bedtime PRN Insomnia  ondansetron Injectable 4 milliGRAM(s) IV Push every 8 hours PRN Nausea and/or Vomiting      CAPILLARY BLOOD GLUCOSE      POCT Blood Glucose.: 165 mg/dL (12 Feb 2024 12:35)  POCT Blood Glucose.: 282 mg/dL (12 Feb 2024 09:13)  POCT Blood Glucose.: 323 mg/dL (11 Feb 2024 21:36)  POCT Blood Glucose.: 356 mg/dL (11 Feb 2024 17:14)    I&O's Summary    11 Feb 2024 07:01  -  12 Feb 2024 07:00  --------------------------------------------------------  IN: 1140 mL / OUT: 640 mL / NET: 500 mL        PHYSICAL EXAM:  Vital Signs Last 24 Hrs  T(C): 36.6 (12 Feb 2024 12:00), Max: 36.6 (12 Feb 2024 04:29)  T(F): 97.8 (12 Feb 2024 12:00), Max: 97.9 (12 Feb 2024 04:29)  HR: 61 (12 Feb 2024 12:00) (60 - 68)  BP: 136/70 (12 Feb 2024 12:00) (134/66 - 162/76)  BP(mean): --  RR: 18 (12 Feb 2024 12:00) (18 - 18)  SpO2: 96% (12 Feb 2024 12:00) (94% - 97%)    Parameters below as of 12 Feb 2024 12:00  Patient On (Oxygen Delivery Method): room air    CONSTITUTIONAL: NAD, well-developed, well-groomed  RESPIRATORY: Normal respiratory effort; lungs with bilateral wheeze  CARDIOVASCULAR: Regular rate and rhythm, normal S1 and S2, no murmur/rub/gallop  ABDOMEN: Nontender to palpation, soft, nondistended  SKIN: No rashes; no palpable lesions    LABS:                        8.6    7.10  )-----------( 330      ( 11 Feb 2024 07:17 )             26.5     02-11    134<L>  |  98  |  29<H>  ----------------------------<  267<H>  4.1   |  22  |  1.42<H>    Ca    9.4      11 Feb 2024 07:16            Urinalysis Basic - ( 11 Feb 2024 07:16 )    Color: x / Appearance: x / SG: x / pH: x  Gluc: 267 mg/dL / Ketone: x  / Bili: x / Urobili: x   Blood: x / Protein: x / Nitrite: x   Leuk Esterase: x / RBC: x / WBC x   Sq Epi: x / Non Sq Epi: x / Bacteria: x

## 2024-02-12 NOTE — DISCHARGE NOTE PROVIDER - HOSPITAL COURSE
HPI:  77yo male with PMH of bladder tumor s/p resection, TURBT/TURP, diabetes, HTN, BPH, Lung cancer presented for fever  and onfusion. Seen outpatient and found to have positive UA with cultures growing E. coli.  Notes febrile at home with associated symptoms of decreased p.o. intake and increasing confusion. Denies nausea, vomiting, abdominal pain, hematuria, dysuria, urinary urgency. In ED, febrile 99.7 and hypotensive.  Given 3L IVF without improvement requiring levo 0.04.  IVC collapsible with predominant B-lines anteriorly on POCUS given 500 NS with pressors.  Labs pertinent for WBC 16, , BUN/Cr 108/3.08, and  UA positive for leukocyte esterase and bacteria.  Started on ceftriaxone for urosepsis.  MICU consulted for further management of urosepsis on pressors. (05 Feb 2024 22:38)    Hospital Course:  #Acute Hypoxemic Respiratory Failure - improved  - Check RA ambulatory O2 saturation to determine home O2 needs  - Patient has known fibrosis on CT scans but appears to have had an acute change over the last 6 weeks - reasonable to treat for CAP and volume overload  - Given that the CT looked significantly better only 6 weeks before, the time course seems unlikely for progressive decompensation due to patient's pre-existing lung disease. Rather, it appears to be something more acute: infection vs fluid overload vs acute exacerbation of ILD. It is unclear if the fibrosis is true fibrosis, or if fluid or infection has made the existing lung parenchyma more opacified in the setting of emphysema, making the area look fibrosed.    #Interstitial Lung Disease  - Repeat CT scan in 6-8 weeks for evaluation  - Will need outpatient pulmonary function testing  - Consider serologic workup for ILD - to start with JANESSA, ANCA, Myomarker panel, HIV, Sjogrens, and Scleroderma - though patient without stigmata of rheumatologic disease on exam    Important Medication Changes and Reason:    Active or Pending Issues Requiring Follow-up:   Outpatient pulmonary follow-up will be important   Advanced Directives:   [ ] Full code  [ ] DNR  [ ] Hospice    Discharge Diagnoses: e coli uti         HPI:  77yo male with PMH of bladder tumor s/p resection, TURBT/TURP, diabetes, HTN, BPH, Lung cancer presented for fever  and onfusion. Seen outpatient and found to have positive UA with cultures growing E. coli.  Notes febrile at home with associated symptoms of decreased p.o. intake and increasing confusion. Denies nausea, vomiting, abdominal pain, hematuria, dysuria, urinary urgency. In ED, febrile 99.7 and hypotensive.  Given 3L IVF without improvement requiring levo 0.04.  IVC collapsible with predominant B-lines anteriorly on POCUS given 500 NS with pressors.  Labs pertinent for WBC 16, , BUN/Cr 108/3.08, and  UA positive for leukocyte esterase and bacteria.  Started on ceftriaxone for urosepsis.  MICU consulted for further management of urosepsis on pressors. (05 Feb 2024 22:38)    Hospital Course:  #Acute Hypoxemic Respiratory Failure - improved  - Check RA ambulatory O2 saturation to determine home O2 needs  - Patient has known fibrosis on CT scans but appears to have had an acute change over the last 6 weeks - reasonable to treat for CAP and volume overload  - Given that the CT looked significantly better only 6 weeks before, the time course seems unlikely for progressive decompensation due to patient's pre-existing lung disease. Rather, it appears to be something more acute: infection vs fluid overload vs acute exacerbation of ILD. It is unclear if the fibrosis is true fibrosis, or if fluid or infection has made the existing lung parenchyma more opacified in the setting of emphysema, making the area look fibrosed.    #Interstitial Lung Disease  - Repeat CT scan in 6-8 weeks for evaluation  - Will need outpatient pulmonary function testing  - Consider serologic workup for ILD - to start with JANESSA, ANCA, Myomarker panel, HIV, Sjogrens, and Scleroderma - though patient without stigmata of rheumatologic disease on exam    Important Medication Changes and Reason:    Active or Pending Issues Requiring Follow-up:   Outpatient pulmonary follow-up will be important   Advanced Directives:   [ ] Full code  [ ] DNR  [ ] Hospice    Discharge Diagnoses:  Acute hypoxic respiratory failure  Interstitial lung disease  Sepsis due to UTI  Community acquired pneumonia  Urinary retention  Acute kidney injury  Hyponatremia  Anemia  Diabetes Mellitus  Hypertension

## 2024-02-12 NOTE — PROGRESS NOTE ADULT - ASSESSMENT
78M with extensive smoking history, lung cancer, ILD a/w urosepsis/shock and acute hypoxemic respiratory failure.  Urine culture with pan sensitive Ecoli, POLINA improving.    #Acute Hypoxemic Respiratory Failure - improved  - Check RA ambulatory O2 saturation to determine home O2 needs  - Patient has known fibrosis on CT scans but appears to have had an acute change over the last 6 weeks - reasonable to treat for CAP and volume overload  - Given that the CT looked significantly better only 6 weeks before, the time course seems unlikely for progressive decompensation due to patient's pre-existing lung disease. Rather, it appears to be something more acute: infection vs fluid overload vs acute exacerbation of ILD. It is unclear if the fibrosis is true fibrosis, or if fluid or infection has made the existing lung parenchyma more opacified in the setting of emphysema, making the area look fibrosed.    #Hemoptysis - none reported overnight    #Interstitial Lung Disease  - Repeat CT scan in 6-8 weeks for evaluation  - Will need outpatient pulmonary function testing  - Consider serologic workup for ILD - to start with JANESSA, ANCA, Myomarker panel, HIV, Sjogrens, and Scleroderma - though patient without stigmata of rheumatologic disease on exam    #Obstructive Lung Disease - patient with extensive smoking history  - PFTs as outpatient  - Continue symbicort  - Continue Duoneb  - Incentive spirometer and acapella to facilitate breathing  - Maintain O2 sat > 90%    #Infectious Disease  - RVP negative - unclear why patient on Oseltamivir  - Complete 7 day course of antibiotics or CAP  - Outpatient follow-up CT chest in 6 weeks     #DVT proph     #Outpatient pulmonary follow-up will be important - discussed with patient and son-in-law today and all questions answered  - They tell me they are arranging evaluation with Dr. Jose Marshall  - Check ambulatory O2 saturation to determine home O2 needs  - Incentive spirometer and acapella

## 2024-02-12 NOTE — DISCHARGE NOTE PROVIDER - NSDCFUADDAPPT_GEN_ALL_CORE_FT
APPTS ARE READY TO BE MADE: [ ] YES    Best Family or Patient Contact (if needed):    Additional Information about above appointments (if needed):    1: [place] 1 week, will need repeat CT chest in 6 weeks  2:   3:     Other comments or requests:    APPTS ARE READY TO BE MADE: [ ] YES    Best Family or Patient Contact (if needed):    Additional Information about above appointments (if needed):    1: [place] 1 week, will need repeat CT chest in 6 weeks  2:   3:     Other comments or requests:

## 2024-02-12 NOTE — PROGRESS NOTE ADULT - PROBLEM/PLAN-6
CARDIOVASCULAR - ADULT    • Maintains optimal cardiac output and hemodynamic stability Progressing    • Absence of cardiac arrhythmias or at baseline Progressing        DISCHARGE PLANNING    • Discharge to home or other facility with appropriate resources
patient request.  Daily weight, I/O, 1800 ml FR. I/O. Up TID as tolerated. Will continue to monitor.
DISPLAY PLAN FREE TEXT
No lesions; no rash

## 2024-02-12 NOTE — DISCHARGE NOTE PROVIDER - NSDCMRMEDTOKEN_GEN_ALL_CORE_FT
escitalopram 20 mg oral tablet: 1 tab(s) orally once a day (at bedtime)  glipiZIDE 5 mg oral tablet: 1 tab(s) orally once a day (at bedtime)  Jentadueto 2.5 mg-1000 mg oral tablet: 1 tab(s) orally once a day  Multiple Vitamins oral tablet: 1 tab(s) orally once a day  olmesartan 20 mg oral tablet: 1 tab(s) orally once a day  Physical therapy at home: as needed  rosuvastatin 10 mg oral tablet: 1 tab(s) orally once a day  sodium bicarbonate 650 mg oral tablet: 1 tab(s) orally 3 times a day  Vitamin D and Vitamin B12: 1 tablet daily  zinc (as gluconate) 10 mg oral tablet: 1 tab(s) orally once a day   budesonide-formoterol 160 mcg-4.5 mcg/inh inhalation aerosol: 1 inhaled 2 times a day  cefpodoxime 200 mg oral tablet: 1 tab(s) orally 2 times a day  escitalopram 20 mg oral tablet: 1 tab(s) orally once a day (at bedtime)  glipiZIDE 5 mg oral tablet: 1 tab(s) orally once a day (at bedtime)  Jentadueto 2.5 mg-1000 mg oral tablet: 1 tab(s) orally once a day  Multiple Vitamins oral tablet: 1 tab(s) orally once a day  olmesartan 20 mg oral tablet: 1 tab(s) orally once a day  oseltamivir 30 mg oral capsule: 1 cap(s) orally every 24 hours  rosuvastatin 10 mg oral tablet: 1 tab(s) orally once a day  sodium bicarbonate 650 mg oral tablet: 1 tab(s) orally 3 times a day

## 2024-02-12 NOTE — DISCHARGE NOTE PROVIDER - PROVIDER TOKENS
PROVIDER:[TOKEN:[[id number]],FOLLOWUP:[1 week]],PROVIDER:[TOKEN:[[id number]],FOLLOWUP:[1 week]] PROVIDER:[TOKEN:[[id number]],FOLLOWUP:[1 week]],PROVIDER:[TOKEN:[[id number]]]

## 2024-02-12 NOTE — PROGRESS NOTE ADULT - PROBLEM SELECTOR PLAN 3
- Admitted to MICU initially due to likely septic shock from UTI, requiring pressors, now off  - Urine cx growing E. coli - c/w ceftriaxone

## 2024-02-12 NOTE — PROGRESS NOTE ADULT - TIME BILLING
review of records/results/imaging, pulmonary evaluation/assessment/documentation, and discussion with patient/family/medical team
Review of imaging, labs, EMR, medical management and discussion with patient, family and NP Stephanie
chart review; lab review; discussion with patient and family about plan of care and updates

## 2024-02-12 NOTE — PROGRESS NOTE ADULT - PROBLEM SELECTOR PROBLEM 1
Acute hypoxemic respiratory failure
Sepsis secondary to UTI
Sepsis secondary to UTI
Acute hypoxemic respiratory failure
Sepsis secondary to UTI
Sepsis secondary to UTI

## 2024-02-12 NOTE — DISCHARGE NOTE NURSING/CASE MANAGEMENT/SOCIAL WORK - PATIENT PORTAL LINK FT
You can access the FollowMyHealth Patient Portal offered by [facility] by registering at the following website: [email or web address]. By joining [employer]’s FollowMyHealth portal, you will also be able to view your health information using other applications (apps) compatible with our system.

## 2024-02-12 NOTE — PROGRESS NOTE ADULT - PROBLEM SELECTOR PROBLEM 2
POLINA (acute kidney injury)
ILD (interstitial lung disease)
ILD (interstitial lung disease)
Acute hypoxemic respiratory failure

## 2024-02-12 NOTE — DISCHARGE NOTE PROVIDER - EXTENDED VTE YES NO FOR MLM ENOXAPARIN
, Quality 110: Preventive Care And Screening: Influenza Immunization: Influenza Immunization Administered during Influenza season Quality 431: Preventive Care And Screening: Unhealthy Alcohol Use - Screening: Patient screened for unhealthy alcohol use using a single question and scores less than 2 times per year Quality 226: Preventive Care And Screening: Tobacco Use: Screening And Cessation Intervention: Patient screened for tobacco use and is an ex/non-smoker Detail Level: Detailed Quality 130: Documentation Of Current Medications In The Medical Record: Current Medications with Name, Dosage, Frequency, or Route not Documented, Reason not Given

## 2024-02-12 NOTE — DISCHARGE NOTE PROVIDER - CARE PROVIDER_API CALL
MARILU PEREZ, [place] [address]  Phone: [phone]  Fax: ()-  Follow Up Time: 1 week    Lopez Wills  Pulmonary Disease  [address] [place] [address]  Phone: [phone]  Fax: [phone]  Follow Up Time: 1 week   MARILU PEREZ, [place] [id number] [place] [address]  Phone: ()-  Fax: ()-  Follow Up Time: 1 week    Jose Marshall  Pulmonary Disease  [facility] [address] [address]  Phone: [phone]  Fax: [phone]  Follow Up Time:

## 2024-02-12 NOTE — DISCHARGE NOTE PROVIDER - NSDCCPTREATMENT_GEN_ALL_CORE_FT
PRINCIPAL PROCEDURE  Procedure: CT of chest without contrast  Findings and Treatment: PROCEDURE:  CT scan of the chest was obtained without intravenous contrast.  FINDINGS:  LYMPH NODES: A 1.5 x 1.0 cm pretracheal node. Several additional   subcentimeter short axis mediastinal nodes. Several calcified left hilar   nodes suggest prior granulomatous inflammation.  HEART/VASCULATURE: Heart size is normal. Trace pericardial fluid.   Coronary calcifications.  AIRWAYS/LUNGS/PLEURA: Small secretions in the upper airway. Left upper   lobe wedge resection. Emphysema. Diffuse fibrotic lung changes with   inter- and intralobular septal thickening, traction bronchiectasis, and   architectural distortion most pronounced at the lung bases, this   appearance is increased from prior exam in 2008. Bilateral dependent   groundglass opacities. Small bilateral pleural effusions.  UPPER ABDOMEN: Right renal cysts.  BONES/SOFT TISSUES: Degenerative changes of the cervical spine.  IMPRESSION:  Increased lung fibrosis since 2008.  Nonspecific dependent ground glass opacities bilaterally.  Small bilateral pleural effusions.  --- End of Report ---

## 2024-02-12 NOTE — PROGRESS NOTE ADULT - SUBJECTIVE AND OBJECTIVE BOX
[facility] DIVISION OF PULMONARY, CRITICAL CARE and SLEEP MEDICINE  PULMONARY PROGRESS NOTE  OFFICE NUMBER: [phone]    PATIENT INFORMATION:  NAME: VINNY SUGGS:  MRN: [id number]    CHIEF COMPLAINT: Patient is a 78y old  Male who presents with a chief complaint of Hypotension (11 Feb 2024 14:10)      [x] INITIAL CONSULT, H&P, FAMILY HISTORY and PAST MEDICAL AND SURGICAL HISTORY REVIEWED    OVERNIGHT EVENTS or CHANGES TO HPI:   - Saturating well on RA today  - Son-in-law at bedside denies any new complaints and tells me they have set up outpatient follow-up with Dr. Jose Marshall  - Patient has known ILD for many years but has not followed with any pulmonologist recently  - Continue antibiotics    ========================REVIEW OF SYSTEMS========================  CONSTITUTIONAL: Feeling better   CARDIOVASCULAR: Denies chest pain or palpitations   PULMONARY: Cough improving, no hemoptysis   [x] REMAINING REVIEW OF SYSTEMS NEGATIVE  [] UNABLE TO OBTAIN REVIEW OF SYSTEMS DUE TO _______________    ========================MEDICATIONS=============================  MEDICATIONS  (STANDING):  albuterol/ipratropium for Nebulization 3 milliLiter(s) Nebulizer every 6 hours  atorvastatin 40 milliGRAM(s) Oral at bedtime  azithromycin  IVPB 500 milliGRAM(s) IV Intermittent every 24 hours  azithromycin  IVPB      benzocaine/menthol Lozenge 1 Lozenge Oral four times a day  budesonide 160 MICROgram(s)/formoterol 4.5 MICROgram(s) Inhaler 2 Puff(s) Inhalation two times a day  cefTRIAXone   IVPB 1000 milliGRAM(s) IV Intermittent every 24 hours  escitalopram 20 milliGRAM(s) Oral at bedtime  heparin   Injectable 5000 Unit(s) SubCutaneous every 8 hours  influenza  Vaccine (HIGH DOSE) 0.7 milliLiter(s) IntraMuscular once  insulin glargine Injectable (LANTUS) 7 Unit(s) SubCutaneous at bedtime  insulin lispro (ADMELOG) corrective regimen sliding scale   SubCutaneous at bedtime  insulin lispro (ADMELOG) corrective regimen sliding scale   SubCutaneous three times a day before meals  insulin lispro Injectable (ADMELOG) 4 Unit(s) SubCutaneous three times a day before meals  multivitamin 1 Tablet(s) Oral daily  oseltamivir 30 milliGRAM(s) Oral every 24 hours  sodium bicarbonate 650 milliGRAM(s) Oral three times a day      MEDICATIONS  (PRN):  acetaminophen     Tablet .. 650 milliGRAM(s) Oral every 6 hours PRN Temp greater or equal to 38C (100.4F), Mild Pain (1 - 3)  aluminum hydroxide/magnesium hydroxide/simethicone Suspension 30 milliLiter(s) Oral every 4 hours PRN Dyspepsia  guaiFENesin Oral Liquid (Sugar-Free) 200 milliGRAM(s) Oral every 6 hours PRN Cough  melatonin 3 milliGRAM(s) Oral at bedtime PRN Insomnia  ondansetron Injectable 4 milliGRAM(s) IV Push every 8 hours PRN Nausea and/or Vomiting      ========================PHYSICAL EXAM============================    VITALS: ICU Vital Signs Last 24 Hrs  T(C): 36.6 (12 Feb 2024 04:29), Max: 36.6 (12 Feb 2024 04:29)  T(F): 97.9 (12 Feb 2024 04:29), Max: 97.9 (12 Feb 2024 04:29)  HR: 68 (12 Feb 2024 04:29) (60 - 80)  BP: 134/66 (12 Feb 2024 04:29) (134/66 - 162/76)  RR: 18 (12 Feb 2024 04:29) (18 - 20)  SpO2: 94% (12 Feb 2024 04:29) (90% - 97%)    O2 Parameters below as of 12 Feb 2024 04:29  Patient On (Oxygen Delivery Method): room air      INTAKE and OUTPUT: I&O's Summary    11 Feb 2024 07:01  -  12 Feb 2024 07:00  --------------------------------------------------------  IN: 1140 mL / OUT: 640 mL / NET: 500 mL        VENTILATOR SETTINGS: N/A    GENERAL: AAOx3, NAD   EYES: anicteric, EOMI  EAR/NOSE/MOUTH/THROAT: NCAT, MMM, nares clear, no thrush  NECK: supple   CARDIOVASCULAR: RRR, S1S2  RESPIRATORY: inspiratory crackles bilateral lower lung fields, no wheeze   ABDOMEN: soft, NT, ND, +BS  EXTREMITIES: no cyanosis or LE edema   SKIN: warm a  MUSCULOSKELETAL: strength intact  NEUROLOGIC: nonfocal exam   PSYCHIATRIC: calm     ========================LABORATORY RESULTS AND IMAGING=============                        8.6    7.10  )-----------( 330      ( 11 Feb 2024 07:17 )             26.5                                                    02-11    134<L>  |  98  |  29<H>  ----------------------------<  267<H>  4.1   |  22  |  1.42<H>    Ca    9.4      11 Feb 2024 07:16      Creatinine Trend: 1.42<--, 1.54<--, 1.49<--, 1.59<--, 2.00<--, 2.66<--    CT CHEST: < from: CT Chest No Cont (02.08.24 @ 09:14) >  LYMPH NODES: A 1.5 x 1.0 cm pretracheal node. Severaladditional   subcentimeter short axis mediastinal nodes. Several calcified left hilar   nodes suggest prior granulomatous inflammation.    HEART/VASCULATURE: Heart size is normal. Trace pericardial fluid.   Coronary calcifications.    AIRWAYS/LUNGS/PLEURA: Small secretions in the upper airway. Left upper   lobe wedge resection. Emphysema. Diffuse fibrotic lung changes with   inter- and intralobular septal thickening, traction bronchiectasis, and   architectural distortion most pronounced at the lung bases, this   appearance is increased from prior exam in 2008. Bilateral dependent   groundglass opacities. Small bilateral pleural effusions.    UPPER ABDOMEN: Right renal cysts.    BONES/SOFT TISSUES: Degenerative changes of the cervical spine.    IMPRESSION:    Increased lung fibrosis since 2008.  Nonspecific dependent ground glass opacities bilaterally.  Small bilateral pleural effusions.    < end of copied text >      [] RADIOLOGY REVIEWED AND INTERPRETED BY ME      THANK YOU FOR ALLOWING US TO PARTICIPATE IN THE CARE OF THIS PATIENT

## 2024-02-12 NOTE — PROGRESS NOTE ADULT - PROBLEM SELECTOR PLAN 4
- Requiring potter placed 2/7, no hx of urinary retention. S/p neobladder w/ surgically removed prostate  - optimize with OOBTC, regular bowel movements  - had bowel movements 2/8  - Passed TOV

## 2024-02-12 NOTE — PROGRESS NOTE ADULT - NSPROGADDITIONALINFOA_GEN_ALL_CORE
Medically ready for discharge, discussed with patient, family at bedside 4D ACP and pulmonary consultant. Total time spent discharge planning 41 minutes.

## 2024-02-12 NOTE — PROGRESS NOTE ADULT - PROBLEM SELECTOR PLAN 10
- HSQ for DVT ppx  - PT recs home PT    DISPO: Home today
- HSQ for DVT ppx  - PT recs home PT    DISPO: Pending trial of lasix, weaning off o2 vs. new home o2 set up, repeat TOV, pulm recs re: possible steroid trial. Planned for HPT once medically cleared- possible DC over the weekend.  plan d/w and agreed on by pt and nephew Rolando at bedside.     The necessity of the time spent during the encounter on this date of service was due to:   - Ordering, reviewing, and interpreting labs, testing, and imaging.  - Independently obtaining a review of systems and performing a physical exam  - Reviewing prior hospitalization and where necessary, outpatient records.  - Counselling and educating patient and family regarding interpretation of aforementioned items and plan of care.    Time-based billing (NON-critical care). Total minutes spent: 54
- HSQ for DVT ppx  - PT recs home PT    DISPO: Pending trial of Lasix, weaning off o2 vs. new home o2 set up, repeat TOV, pulm recs re: possible steroid trial. Planned for HPT once medically cleared.
- HSQ for DVT ppx  - PT recs home PT    DISPO: Pending trial of Lasix, weaning off o2 vs. new home o2 set up, repeat TOV, pulm recs re: possible steroid trial. Planned for HPT once medically cleared - possible DC over the weekend.  plan d/w and agreed on by pt and bel Marshall at bedside.
- HSQ for DVT ppx  - PT recs home PT    DISPO: pending final urine cx results; clinical improvement; stable BP

## 2024-02-12 NOTE — PROGRESS NOTE ADULT - PROVIDER SPECIALTY LIST ADULT
Hospitalist
Hospitalist
MICU
Pulmonology
Internal Medicine
Pulmonology
Pulmonology
Hospitalist
Hospitalist
Internal Medicine

## 2024-02-12 NOTE — DISCHARGE NOTE PROVIDER - CARE PROVIDERS DIRECT ADDRESSES
,DirectAddress_Unknown,[email or web address] ,DirectAddress_Unknown,[email or web address]

## 2024-02-12 NOTE — PROGRESS NOTE ADULT - PROBLEM SELECTOR PLAN 2
- Hx of ILD, previously followed with Dr. Mcpherson @ [facility] but has not seen in the last 3 years  - prior imaging reports placed in paper chart  - Pulm consulted, recs as above

## 2024-02-12 NOTE — DISCHARGE NOTE PROVIDER - NSDCCPCAREPLAN_GEN_ALL_CORE_FT
PRINCIPAL DISCHARGE DIAGNOSIS  Diagnosis: UTI symptoms  Assessment and Plan of Treatment: HOME CARE INSTRUCTIONS  f you were prescribed antibiotics, take them exactly as your caregiver instructs you. Finish the medication even if you feel better after you have only taken some of the medication.  Drink enough water and fluids to keep your urine clear or pale yellow.  Avoid caffeine, tea, and carbonated beverages. They tend to irritate your bladder.  Empty your bladder often. Avoid holding urine for long periods of time.  Empty your bladder before and after sexual intercourse.  After a bowel movement, women should cleanse from front to back. Use each tissue only once.  SEEK MEDICAL CARE IF:  You have back pain.  You develop a fever.  Your symptoms do not begin to resolve within 3 days.  SEEK IMMEDIATE MEDICAL CARE IF:  You have severe back pain or lower abdominal pain.  You develop chills.  You have nausea or vomiting.  You have continued burning or discomfort with urination.        SECONDARY DISCHARGE DIAGNOSES  Diagnosis: Sepsis secondary to UTI  Assessment and Plan of Treatment: Take all antibiotics as ordered.  Call you Health care provider upon arrival home to make a one week follow up appointment.  If you develop fever, chills, malaise, or change in mental status call your Health Care Provider or go to the Emergency Department.  Nutrition is important, eat small frequent meals to help ensure you get adequate calories.  Do not stay in bed all day!  Increase your activity daily as tolerated.       PRINCIPAL DISCHARGE DIAGNOSIS  Diagnosis: Interstitial lung disease  Assessment and Plan of Treatment: Please continue to take your Symbicort as prescribed and follow up with Dr. Marshall. You will need a repeat CT chest in 6-8 weeks. Please continue to take 3 more days of Cefpodoxime 200mg twice daily.     PRINCIPAL DISCHARGE DIAGNOSIS  Diagnosis: Interstitial lung disease  Assessment and Plan of Treatment: Please continue to take your Symbicort as prescribed and follow up with Dr. Marshall. You will need a repeat CT chest in 6-8 weeks. Please continue to take 3 more days of Cefpodoxime 200mg twice daily.      SECONDARY DISCHARGE DIAGNOSES  Diagnosis: Sepsis secondary to UTI  Assessment and Plan of Treatment: You had a urinary tract infection when you came to the hospital. Please continue to take Cefpodoxime 200mg twice daily as prescribed.

## 2024-02-12 NOTE — DISCHARGE NOTE NURSING/CASE MANAGEMENT/SOCIAL WORK - NSDCFUADDAPPT_GEN_ALL_CORE_FT
APPTS ARE READY TO BE MADE: [ ] YES    Best Family or Patient Contact (if needed):    Additional Information about above appointments (if needed):    1: [place] 1 week, will need repeat CT chest in 6 weeks  2:   3:     Other comments or requests:

## 2024-03-06 PROBLEM — C67.9: Status: ACTIVE | Noted: 2019-11-11

## 2024-03-06 PROBLEM — N39.0 URINARY TRACT INFECTION: Status: ACTIVE | Noted: 2020-03-09

## 2024-03-06 PROBLEM — N47.1 PHIMOSIS: Status: ACTIVE | Noted: 2021-03-11

## 2024-03-06 PROBLEM — N35.914 ANTERIOR URETHRAL STRICTURE: Status: ACTIVE | Noted: 2022-09-13

## 2024-03-06 PROBLEM — R33.9 URINARY RETENTION: Status: ACTIVE | Noted: 2019-11-11

## 2024-03-06 PROBLEM — L90.0 LICHEN SCLEROSUS ET ATROPHICUS: Status: ACTIVE | Noted: 2021-10-18

## 2024-03-06 PROBLEM — N35.912 BULBOUS URETHRAL STRICTURE: Status: ACTIVE | Noted: 2020-06-01

## 2024-03-06 PROBLEM — N52.9 ERECTILE DYSFUNCTION: Status: ACTIVE | Noted: 2019-11-11

## 2024-03-06 PROBLEM — R39.9 LOWER URINARY TRACT SYMPTOMS (LUTS): Status: ACTIVE | Noted: 2019-11-11

## 2024-03-06 PROBLEM — R35.1 NOCTURIA: Status: ACTIVE | Noted: 2019-11-11

## 2024-03-06 PROBLEM — C61 ADENOCARCINOMA OF PROSTATE: Status: ACTIVE | Noted: 2022-03-02

## 2024-03-06 PROBLEM — N32.3 BLADDER DIVERTICULUM: Status: ACTIVE | Noted: 2021-10-18

## 2024-03-06 PROBLEM — K40.90 INGUINAL HERNIA: Status: ACTIVE | Noted: 2019-11-11

## 2024-03-06 NOTE — REVIEW OF SYSTEMS
[Shortness Of Breath] : shortness of breath [see HPI] : see HPI [Urinary Stream Starts/Stops] : urinary stream starts and stops [Urinary Retention] : urinary retention [Initiating Urination Req. Strain] : initiating urination requires straining [Negative] : Heme/Lymph

## 2024-03-07 ENCOUNTER — APPOINTMENT (OUTPATIENT)
Dept: UROLOGY | Facility: CLINIC | Age: 79
End: 2024-03-07
Payer: MEDICARE

## 2024-03-07 DIAGNOSIS — N39.0 URINARY TRACT INFECTION, SITE NOT SPECIFIED: ICD-10-CM

## 2024-03-07 DIAGNOSIS — R35.1 NOCTURIA: ICD-10-CM

## 2024-03-07 DIAGNOSIS — C61 MALIGNANT NEOPLASM OF PROSTATE: ICD-10-CM

## 2024-03-07 DIAGNOSIS — N47.1 PHIMOSIS: ICD-10-CM

## 2024-03-07 DIAGNOSIS — N52.9 MALE ERECTILE DYSFUNCTION, UNSPECIFIED: ICD-10-CM

## 2024-03-07 DIAGNOSIS — N35.914 UNSPECIFIED ANTERIOR URETHRAL STRICTURE, MALE: ICD-10-CM

## 2024-03-07 DIAGNOSIS — R39.9 UNSPECIFIED SYMPTOMS AND SIGNS INVOLVING THE GENITOURINARY SYSTEM: ICD-10-CM

## 2024-03-07 DIAGNOSIS — L90.0 LICHEN SCLEROSUS ET ATROPHICUS: ICD-10-CM

## 2024-03-07 DIAGNOSIS — N35.912 UNSPECIFIED BULBOUS URETHRAL STRICTURE, MALE: ICD-10-CM

## 2024-03-07 DIAGNOSIS — K40.90 UNILATERAL INGUINAL HERNIA, W/OUT OBSTRUCTION OR GANGRENE, NOT SPECIFIED AS RECURRENT: ICD-10-CM

## 2024-03-07 DIAGNOSIS — R33.9 RETENTION OF URINE, UNSPECIFIED: ICD-10-CM

## 2024-03-07 DIAGNOSIS — C67.9 MALIGNANT NEOPLASM OF BLADDER, UNSPECIFIED: ICD-10-CM

## 2024-03-07 DIAGNOSIS — N32.3 DIVERTICULUM OF BLADDER: ICD-10-CM

## 2024-03-07 PROCEDURE — 76857 US EXAM PELVIC LIMITED: CPT | Mod: 59

## 2024-03-07 PROCEDURE — 99215 OFFICE O/P EST HI 40 MIN: CPT

## 2024-03-07 PROCEDURE — 76775 US EXAM ABDO BACK WALL LIM: CPT

## 2024-03-07 NOTE — PHYSICAL EXAM
[General Appearance - Well Developed] : well developed [General Appearance - Well Nourished] : well nourished [Normal Appearance] : normal appearance [Well Groomed] : well groomed [General Appearance - In No Acute Distress] : no acute distress [Abdomen Soft] : soft [Abdomen Tenderness] : non-tender [Costovertebral Angle Tenderness] : no ~M costovertebral angle tenderness [Urethral Meatus] : meatus normal [Penis Abnormality] : normal uncircumcised penis [Urinary Bladder Findings] : the bladder was normal on palpation [Scrotum] : the scrotum was normal [Testes Tenderness] : no tenderness of the testes [Epididymis] : the epididymides were normal [Testes Mass (___cm)] : there were no testicular masses [Prostate Tenderness] : the prostate was not tender [No Prostate Nodules] : no prostate nodules [Skin Color & Pigmentation] : normal skin color and pigmentation [Heart Rate And Rhythm] : Heart rate and rhythm were normal [Edema] : no peripheral edema [] : no respiratory distress [Respiration, Rhythm And Depth] : normal respiratory rhythm and effort [Exaggerated Use Of Accessory Muscles For Inspiration] : no accessory muscle use [Oriented To Time, Place, And Person] : oriented to person, place, and time [Affect] : the affect was normal [Mood] : the mood was normal [Not Anxious] : not anxious [Normal Station and Gait] : the gait and station were normal for the patient's age [No Focal Deficits] : no focal deficits [No Palpable Adenopathy] : no palpable adenopathy [Abdomen Mass (___ Cm)] : no abdominal mass palpated [FreeTextEntry1] : Testicular asymmetry (Rt >Lt).    [Inguinal Lymph Nodes Enlarged Bilaterally] : inguinal

## 2024-03-07 NOTE — ASSESSMENT
[FreeTextEntry1] : I discussed the findings and options with Mr. OSCAR SUGGS, his wife and his nephew in detail. Currently he is doing well.  The goal is to do what he can to prevent these urinary tract infections, including drinking 3 L of water daily and using Crede to urinate in order to facilitate complete bladder emptying.  We also spoke about low-dose antibiotic suppression but with only 2 documented urinary tract infections in the past year we will hold off on this as it will precipitate resistant strains and superinfections.  Mr. Suggs will be spending a good part of the summer in [facility] and as such he will request from Dr. Dick's office to be taught how to irrigate the neobladder as needed.

## 2024-03-11 LAB — BACTERIA UR CULT: NORMAL

## 2024-05-13 NOTE — ED ADULT NURSE NOTE - NSICDXPASTSURGICALHX_GEN_ALL_CORE_FT
PAST SURGICAL HISTORY:  Elective surgery left ear cyst removal    Elective surgery Nasal cyst removal    History of cholecystectomy     History of surgery left lung lobectomy    History of surgery TURB    
Statement Selected

## 2024-06-13 NOTE — ED PROVIDER NOTE - WET READ LAUNCH FT
Discussed importance of alcohol cessation or at least cutting down.  Patient aware.  Offered support.  Evaluate behavior further at next visit.   
Elevated at visit with BP>140/80. Patient reported not taking her medication this morning. Would like to hold off on adding a 3rd agent at this time.Will re-evaluate BP at next visit and if still not controlled will recommend starting losartan 25.   
There are no Wet Read(s) to document.

## 2024-12-31 NOTE — ED POST DISCHARGE NOTE - RESULT SUMMARY
Ucx PRELIM 50-99k pseudomonas aeruginosa, pt on ciprofloxacin outpt for presumed UTI as per ED provider note, pending final UCx results to determine if abx appropriate. -Romeo Lopes PA-C 0 = swallows foods/liquids without difficulty

## 2025-04-18 NOTE — ED ADULT TRIAGE NOTE - AS PAIN REST
Try supplementing:  - magnesium: 400 mg   - riboflavin (vitamin B2): 400 mg once daily  - coenzyme Q10: 300 mg daily       At onset of migraine take one eletriptan and advil. If in 2 hours migraine is not 0/10 take another eletriptan      Topamax titration:  Cut one tablet in half for one dose for a few days or a week  Then one tablet for a few days or a week  Then a half tablet for a few days or a week   Then off.   
3 (mild pain)

## (undated) DEVICE — SOL IRR BAG NS 0.9% 3000ML

## (undated) DEVICE — WARMING BLANKET UPPER ADULT

## (undated) DEVICE — POSITIONER FOAM EGG CRATE ULNAR 2PCS (PINK)

## (undated) DEVICE — TUBING RANGER FLUID IRRIGATION SET DISP

## (undated) DEVICE — PACK CYSTO

## (undated) DEVICE — FOLEY CATH 3-WAY 20FR 30CC LATEX HEMATURIA

## (undated) DEVICE — UROVAC

## (undated) DEVICE — GLV 7.5 PROTEXIS (WHITE)

## (undated) DEVICE — TAPE SILK 3"

## (undated) DEVICE — DRAINAGE BAG URINARY 4L

## (undated) DEVICE — FOLEY CATH 3-WAY 22FR 30CC LATEX HEMATURIA

## (undated) DEVICE — VENODYNE/SCD SLEEVE CALF MEDIUM

## (undated) DEVICE — TUBING TUR 2 PRONG